# Patient Record
Sex: FEMALE
[De-identification: names, ages, dates, MRNs, and addresses within clinical notes are randomized per-mention and may not be internally consistent; named-entity substitution may affect disease eponyms.]

---

## 2018-09-23 ENCOUNTER — HOSPITAL ENCOUNTER (EMERGENCY)
Dept: HOSPITAL 92 - ERS | Age: 63
Discharge: HOME | End: 2018-09-23
Payer: SELF-PAY

## 2018-09-23 DIAGNOSIS — I50.9: ICD-10-CM

## 2018-09-23 DIAGNOSIS — Z79.84: ICD-10-CM

## 2018-09-23 DIAGNOSIS — I11.0: ICD-10-CM

## 2018-09-23 DIAGNOSIS — E11.9: ICD-10-CM

## 2018-09-23 DIAGNOSIS — R51: Primary | ICD-10-CM

## 2018-09-23 DIAGNOSIS — Z79.899: ICD-10-CM

## 2018-09-23 PROCEDURE — 96366 THER/PROPH/DIAG IV INF ADDON: CPT

## 2018-09-23 PROCEDURE — 70450 CT HEAD/BRAIN W/O DYE: CPT

## 2018-09-23 PROCEDURE — 96365 THER/PROPH/DIAG IV INF INIT: CPT

## 2018-09-23 PROCEDURE — 96375 TX/PRO/DX INJ NEW DRUG ADDON: CPT

## 2018-09-23 NOTE — CT
CT OF THE BRAIN WITHOUT CONTRAST:

 

COMPARISON: 

1/28/17.

 

HISTORY:  

Headache.

 

TECHNIQUE: 

Multiple contiguous axial images were obtained in a CT of the brain without contrast.

 

FINDINGS: 

The brain is normal in morphology and attenuation without focal lesions or confluent areas of infarct
ion.  There is no evidence of hydrocephalus, intracranial hemorrhage, or extraaxial fluid collection.
 

 

The calvarium and overlying soft tissues are unremarkable.  The visualized paranasal sinuses and mast
oid air cells are well aerated.

 

IMPRESSION: 

No evidence of acute intracranial abnormality.

 

POS: [facility]

## 2020-10-12 ENCOUNTER — HOSPITAL ENCOUNTER (INPATIENT)
Dept: HOSPITAL 92 - ERS | Age: 65
LOS: 36 days | Discharge: HOSPICE | DRG: 4 | End: 2020-11-17
Attending: HOSPITALIST | Admitting: STUDENT IN AN ORGANIZED HEALTH CARE EDUCATION/TRAINING PROGRAM
Payer: MEDICARE

## 2020-10-12 VITALS — BODY MASS INDEX: 31.7 KG/M2

## 2020-10-12 DIAGNOSIS — Z79.890: ICD-10-CM

## 2020-10-12 DIAGNOSIS — E11.22: ICD-10-CM

## 2020-10-12 DIAGNOSIS — G92: ICD-10-CM

## 2020-10-12 DIAGNOSIS — Z79.899: ICD-10-CM

## 2020-10-12 DIAGNOSIS — Z79.01: ICD-10-CM

## 2020-10-12 DIAGNOSIS — E83.51: ICD-10-CM

## 2020-10-12 DIAGNOSIS — H40.9: ICD-10-CM

## 2020-10-12 DIAGNOSIS — G72.81: ICD-10-CM

## 2020-10-12 DIAGNOSIS — U07.1: ICD-10-CM

## 2020-10-12 DIAGNOSIS — Z98.41: ICD-10-CM

## 2020-10-12 DIAGNOSIS — A41.89: Primary | ICD-10-CM

## 2020-10-12 DIAGNOSIS — N18.30: ICD-10-CM

## 2020-10-12 DIAGNOSIS — E11.65: ICD-10-CM

## 2020-10-12 DIAGNOSIS — I50.43: ICD-10-CM

## 2020-10-12 DIAGNOSIS — I35.1: ICD-10-CM

## 2020-10-12 DIAGNOSIS — R00.1: ICD-10-CM

## 2020-10-12 DIAGNOSIS — D69.6: ICD-10-CM

## 2020-10-12 DIAGNOSIS — E66.9: ICD-10-CM

## 2020-10-12 DIAGNOSIS — D63.1: ICD-10-CM

## 2020-10-12 DIAGNOSIS — J12.89: ICD-10-CM

## 2020-10-12 DIAGNOSIS — Z99.11: ICD-10-CM

## 2020-10-12 DIAGNOSIS — R13.10: ICD-10-CM

## 2020-10-12 DIAGNOSIS — Z79.4: ICD-10-CM

## 2020-10-12 DIAGNOSIS — E03.9: ICD-10-CM

## 2020-10-12 DIAGNOSIS — I13.0: ICD-10-CM

## 2020-10-12 DIAGNOSIS — N17.0: ICD-10-CM

## 2020-10-12 DIAGNOSIS — J96.01: ICD-10-CM

## 2020-10-12 DIAGNOSIS — E87.2: ICD-10-CM
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
## 2020-10-12 DIAGNOSIS — Z90.49: ICD-10-CM

## 2020-10-12 DIAGNOSIS — Z98.51: ICD-10-CM

## 2020-10-12 DIAGNOSIS — B96.1: ICD-10-CM

## 2020-10-12 DIAGNOSIS — G89.29: ICD-10-CM

## 2020-10-12 DIAGNOSIS — E87.5: ICD-10-CM

## 2020-10-12 DIAGNOSIS — Z66: ICD-10-CM

## 2020-10-12 DIAGNOSIS — E87.6: ICD-10-CM

## 2020-10-12 DIAGNOSIS — Z78.1: ICD-10-CM

## 2020-10-12 DIAGNOSIS — Z51.5: ICD-10-CM

## 2020-10-12 DIAGNOSIS — Z98.42: ICD-10-CM

## 2020-10-12 DIAGNOSIS — T79.7XXA: ICD-10-CM

## 2020-10-12 DIAGNOSIS — J93.83: ICD-10-CM

## 2020-10-12 DIAGNOSIS — N39.0: ICD-10-CM

## 2020-10-12 DIAGNOSIS — E46: ICD-10-CM

## 2020-10-12 DIAGNOSIS — E78.5: ICD-10-CM

## 2020-10-12 DIAGNOSIS — T38.0X5A: ICD-10-CM

## 2020-10-12 DIAGNOSIS — M54.9: ICD-10-CM

## 2020-10-12 LAB
ALBUMIN SERPL BCG-MCNC: 2.9 G/DL (ref 3.4–4.8)
ALP SERPL-CCNC: 65 U/L (ref 40–110)
ALT SERPL W P-5'-P-CCNC: 15 U/L (ref 8–55)
ANION GAP SERPL CALC-SCNC: 15 MMOL/L (ref 10–20)
APTT PPP: 41.9 SEC (ref 22.9–36.1)
AST SERPL-CCNC: 26 U/L (ref 5–34)
BASOPHILS # BLD AUTO: 0 THOU/UL (ref 0–0.2)
BASOPHILS NFR BLD AUTO: 0.2 % (ref 0–1)
BILIRUB SERPL-MCNC: 0.4 MG/DL (ref 0.2–1.2)
BUN SERPL-MCNC: 34 MG/DL (ref 9.8–20.1)
CALCIUM SERPL-MCNC: 7.5 MG/DL (ref 7.8–10.44)
CHLORIDE SERPL-SCNC: 105 MMOL/L (ref 98–107)
CK SERPL-CCNC: 148 U/L (ref 29–168)
CO2 SERPL-SCNC: 16 MMOL/L (ref 23–31)
CREAT CL PREDICTED SERPL C-G-VRATE: 0 ML/MIN (ref 70–130)
EOSINOPHIL # BLD AUTO: 0 THOU/UL (ref 0–0.7)
EOSINOPHIL NFR BLD AUTO: 0.3 % (ref 0–10)
GLOBULIN SER CALC-MCNC: 3.7 G/DL (ref 2.4–3.5)
GLUCOSE SERPL-MCNC: 185 MG/DL (ref 80–115)
HGB BLD-MCNC: 10.3 G/DL (ref 12–16)
INR PPP: 1.1
LIPASE SERPL-CCNC: 24 U/L (ref 8–78)
LYMPHOCYTES # BLD: 1.1 THOU/UL (ref 1.2–3.4)
LYMPHOCYTES NFR BLD AUTO: 17.7 % (ref 21–51)
MCH RBC QN AUTO: 30.6 PG (ref 27–31)
MCV RBC AUTO: 87.9 FL (ref 78–98)
MONOCYTES # BLD AUTO: 0.5 THOU/UL (ref 0.11–0.59)
MONOCYTES NFR BLD AUTO: 7.4 % (ref 0–10)
NEUTROPHILS # BLD AUTO: 4.6 THOU/UL (ref 1.4–6.5)
NEUTROPHILS NFR BLD AUTO: 74.4 % (ref 42–75)
PLATELET # BLD AUTO: 160 THOU/UL (ref 130–400)
POTASSIUM SERPL-SCNC: 4 MMOL/L (ref 3.5–5.1)
PROTHROMBIN TIME: 14 SEC (ref 12–14.7)
RBC # BLD AUTO: 3.38 MILL/UL (ref 4.2–5.4)
SODIUM SERPL-SCNC: 132 MMOL/L (ref 136–145)
WBC # BLD AUTO: 6.2 THOU/UL (ref 4.8–10.8)

## 2020-10-12 PROCEDURE — 70450 CT HEAD/BRAIN W/O DYE: CPT

## 2020-10-12 PROCEDURE — 86900 BLOOD TYPING SEROLOGIC ABO: CPT

## 2020-10-12 PROCEDURE — C1751 CATH, INF, PER/CENT/MIDLINE: HCPCS

## 2020-10-12 PROCEDURE — 87340 HEPATITIS B SURFACE AG IA: CPT

## 2020-10-12 PROCEDURE — 86901 BLOOD TYPING SEROLOGIC RH(D): CPT

## 2020-10-12 PROCEDURE — P9047 ALBUMIN (HUMAN), 25%, 50ML: HCPCS

## 2020-10-12 PROCEDURE — 87635 SARS-COV-2 COVID-19 AMP PRB: CPT

## 2020-10-12 PROCEDURE — 83690 ASSAY OF LIPASE: CPT

## 2020-10-12 PROCEDURE — 87040 BLOOD CULTURE FOR BACTERIA: CPT

## 2020-10-12 PROCEDURE — 85007 BL SMEAR W/DIFF WBC COUNT: CPT

## 2020-10-12 PROCEDURE — 86850 RBC ANTIBODY SCREEN: CPT

## 2020-10-12 PROCEDURE — 85610 PROTHROMBIN TIME: CPT

## 2020-10-12 PROCEDURE — 95819 EEG AWAKE AND ASLEEP: CPT

## 2020-10-12 PROCEDURE — 86706 HEP B SURFACE ANTIBODY: CPT

## 2020-10-12 PROCEDURE — P9017 PLASMA 1 DONOR FRZ W/IN 8 HR: HCPCS

## 2020-10-12 PROCEDURE — 93306 TTE W/DOPPLER COMPLETE: CPT

## 2020-10-12 PROCEDURE — 96365 THER/PROPH/DIAG IV INF INIT: CPT

## 2020-10-12 PROCEDURE — 94660 CPAP INITIATION&MGMT: CPT

## 2020-10-12 PROCEDURE — 70490 CT SOFT TISSUE NECK W/O DYE: CPT

## 2020-10-12 PROCEDURE — 36416 COLLJ CAPILLARY BLOOD SPEC: CPT

## 2020-10-12 PROCEDURE — 84484 ASSAY OF TROPONIN QUANT: CPT

## 2020-10-12 PROCEDURE — 87186 SC STD MICRODIL/AGAR DIL: CPT

## 2020-10-12 PROCEDURE — 87324 CLOSTRIDIUM AG IA: CPT

## 2020-10-12 PROCEDURE — 71045 X-RAY EXAM CHEST 1 VIEW: CPT

## 2020-10-12 PROCEDURE — 80202 ASSAY OF VANCOMYCIN: CPT

## 2020-10-12 PROCEDURE — 82550 ASSAY OF CK (CPK): CPT

## 2020-10-12 PROCEDURE — 87449 NOS EACH ORGANISM AG IA: CPT

## 2020-10-12 PROCEDURE — 85027 COMPLETE CBC AUTOMATED: CPT

## 2020-10-12 PROCEDURE — 83036 HEMOGLOBIN GLYCOSYLATED A1C: CPT

## 2020-10-12 PROCEDURE — 87077 CULTURE AEROBIC IDENTIFY: CPT

## 2020-10-12 PROCEDURE — 83880 ASSAY OF NATRIURETIC PEPTIDE: CPT

## 2020-10-12 PROCEDURE — 94002 VENT MGMT INPAT INIT DAY: CPT

## 2020-10-12 PROCEDURE — 93005 ELECTROCARDIOGRAM TRACING: CPT

## 2020-10-12 PROCEDURE — S0020 INJECTION, BUPIVICAINE HYDRO: HCPCS

## 2020-10-12 PROCEDURE — 80048 BASIC METABOLIC PNL TOTAL CA: CPT

## 2020-10-12 PROCEDURE — C1752 CATH,HEMODIALYSIS,SHORT-TERM: HCPCS

## 2020-10-12 PROCEDURE — U0003 INFECTIOUS AGENT DETECTION BY NUCLEIC ACID (DNA OR RNA); SEVERE ACUTE RESPIRATORY SYNDROME CORONAVIRUS 2 (SARS-COV-2) (CORONAVIRUS DISEASE [COVID-19]), AMPLIFIED PROBE TECHNIQUE, MAKING USE OF HIGH THROUGHPUT TECHNOLOGIES AS DESCRIBED BY CMS-2020-01-R: HCPCS

## 2020-10-12 PROCEDURE — 95712 VEEG 2-12 HR INTMT MNTR: CPT

## 2020-10-12 PROCEDURE — 96375 TX/PRO/DX INJ NEW DRUG ADDON: CPT

## 2020-10-12 PROCEDURE — 36415 COLL VENOUS BLD VENIPUNCTURE: CPT

## 2020-10-12 PROCEDURE — 81001 URINALYSIS AUTO W/SCOPE: CPT

## 2020-10-12 PROCEDURE — 95957 EEG DIGITAL ANALYSIS: CPT

## 2020-10-12 PROCEDURE — 85379 FIBRIN DEGRADATION QUANT: CPT

## 2020-10-12 PROCEDURE — 82728 ASSAY OF FERRITIN: CPT

## 2020-10-12 PROCEDURE — 94003 VENT MGMT INPAT SUBQ DAY: CPT

## 2020-10-12 PROCEDURE — 82805 BLOOD GASES W/O2 SATURATION: CPT

## 2020-10-12 PROCEDURE — 85025 COMPLETE CBC W/AUTO DIFF WBC: CPT

## 2020-10-12 PROCEDURE — 87086 URINE CULTURE/COLONY COUNT: CPT

## 2020-10-12 PROCEDURE — P9016 RBC LEUKOCYTES REDUCED: HCPCS

## 2020-10-12 PROCEDURE — 90935 HEMODIALYSIS ONE EVALUATION: CPT

## 2020-10-12 PROCEDURE — 36430 TRANSFUSION BLD/BLD COMPNT: CPT

## 2020-10-12 PROCEDURE — C9113 INJ PANTOPRAZOLE SODIUM, VIA: HCPCS

## 2020-10-12 PROCEDURE — 80053 COMPREHEN METABOLIC PANEL: CPT

## 2020-10-12 PROCEDURE — 85730 THROMBOPLASTIN TIME PARTIAL: CPT

## 2020-10-12 PROCEDURE — 71250 CT THORAX DX C-: CPT

## 2020-10-12 PROCEDURE — 86140 C-REACTIVE PROTEIN: CPT

## 2020-10-12 PROCEDURE — G0257 UNSCHED DIALYSIS ESRD PT HOS: HCPCS

## 2020-10-12 RX ADMIN — INSULIN LISPRO PRN UNIT: 100 INJECTION, SOLUTION INTRAVENOUS; SUBCUTANEOUS at 21:55

## 2020-10-12 RX ADMIN — INSULIN LISPRO PRN UNIT: 100 INJECTION, SOLUTION INTRAVENOUS; SUBCUTANEOUS at 18:35

## 2020-10-12 NOTE — CON
DATE OF CONSULTATION:  10/12/2020



HISTORY OF PRESENT ILLNESS:  A 64-year-old, who has been admitted a few times to

this hospital for a cardiac related issues in the past including a diastolic CHF,

type 2 diabetes, hypertension and lives in [place] and now has acquired COVID

infection, the whole family has been infected by the virus.  It started with her

daughter and so she is having dyspnea and has been sick for about a week and she is

little tachypneic.  No headaches.  No sore throat, odynophagia, or dysphagia.  No

vomiting.  No bleeding.  No chest pain.  No abdominal pain.  Had some diarrhea

yesterday, but not today.  She is having dysuria and she apparently has a history of

frequent UTIs in the past. 



PAST MEDICAL HISTORY:  Hypertension, systolic CHF, and type 2 diabetes.



PAST SURGICAL HISTORY:  Cholecystectomy.



SOCIAL HISTORY:  Retired, used to work at [employer].  Never smoker.  Does not

drink alcoholic beverages.  No drug use. 



FAMILY HISTORY:  COVID infection throughout the family starting with daughter.



PHYSICAL EXAMINATION:

VITAL SIGNS:  /65, pulse 97, respirations 22, temperature 100, and O2

saturation 88. 

SKIN:  Normal.  No lymphadenopathy.  Peripheral IV access.  She is voiding

spontaneously. 

HEENT:  Ocular movements conjugate.  Oral cavity normal. 

LUNGS:  With a few scattered inspiratory crackles.  No wheezing. 

HEART:  S1 and S2.  Regular rate.  No S3, S4, or murmurs. 

ABDOMEN:  Soft, not distended or tender.  No ascites.  No bladder distention. 

EXTREMITIES:  Moves extremities equally.  No edema.  Cognitive function appears to

be intact. 



LABORATORY DATA:  White cell count 6.2, hemoglobin 10.3, platelets 160, and 74%

neutrophils.  D-dimer 0.84.  Creatinine 2.16, her baseline is 1.86 at beginning of

last year.  Liver profile normal.  CRP 17.24.  Albumin 2.9.  The chest x-ray with

basilar confluent infiltrates, more prominent on the right side. 



ASSESSMENT AND PLAN:  Type 2 diabetes, hypertension, systolic congestive heart

failure, recurrent urinary tract infections, and moderate-to-severe COVID pneumonia.

 She is not eligible for remdesivir because of renal function and we will give her a

convalescent plasma.  Continue Decadron and enoxaparin.  Stop azithromycin.  Monitor

inflammatory markers. 







Job ID:  [id number]

## 2020-10-12 NOTE — PDOC.HHP
Hospitalist HPI





- History of Present Illness


Dyspnea


History of Present Illness: 





This is a 64-year-old female patient with a history of CHF, diabetes mellitus, 

hypertension, chronic back pain and hypothyroidism who tested positive for covid

2 days ago now presenting with worsening shortness of breath.


Patient notes that the test was done when they were not checked.


At about 3 AM this morning today feeling short of breath which became 

progressiveleading her to come to the ED for further evaluation.


At presentation her vitals were /65, pulse 97, respiration 22 temperature 

100 oxygen 88 on 2 L oxygen.


Labs showed sodium of 132, bicarb 16 creatinine 2.16 baseline of 1.86 on 

3/29/2019.  And BUN of 34.  PTT was elevated at 41.9, she had anemia of 10.3.  

Chest x-ray shows bilateral infiltrate.


She was started on dexamethasone 10 mg IV push and azithromycin.


Hospitalist team was consulted for admission.





Hospitalist ROS





- Review of Systems


Constitutional: denies: fever, chills, sweats


Respiratory: reports: cough, shortness of breath, SOB with excertion.  denies: 

dry, hemoptysis


Cardiovascular: reports: chest pain.  denies: palpitations, orthopnea


Gastrointestinal: denies: nausea, vomiting, abdominal pain


Genitourinary: denies: dysuria, frequency, incontinence


Neurological: denies: weakness, numbness, incoordination





- Medication


Medications: 


Medications


Amlodipine 10 mg daily


Dorzolamide/timolol 1 drop each eye twice daily


Lasix 40 mg daily


Glipizide 5 mg every morning


Levothyroxine 25 mcg daily


Lisinopril/HCTZ 20/12.5 mg twice daily


Lovastatin 20 mg daily


Metoprolol tartrate 50 mg twice daily


Spironolactone 25 mg daily


Cefdinir 300 mg twice daily


Vitamin D3 thousand units daily





Allergies: No known drug allergies





Hospitalist History





- Past Medical History


Cardiac: reports: HTN


Endocrine: reports: Diabetes





- Past Surgical History


Past Surgical History: reports: Cholecystectomy





- Family History


Other Family History: 





None of significance





- Social History


Smoking Status: Never smoker


Alcohol: reports: None





- Exam


General Appearance: awake alert


Heart: RRR, no murmur, no gallops, normal peripheral pulses


Respiratory - other findings: Bilateral coarse breath sounds.  4 L nasal cannula


Gastrointestinal: soft, non-tender, non-distended, normal bowel sounds


Extremities: no cyanosis, no edema


Neurological: cranial nerve grossly intact


Psychiatric: A&O x 3





Hospitalist Results





- Labs


Result Diagrams: 


                                 10/12/20 11:04





                                 10/12/20 11:04


Lab results: 


                                        











WBC  6.2 thou/uL (4.8-10.8)   10/12/20  11:04    


 


Hgb  10.3 g/dL (12.0-16.0)  L  10/12/20  11:04    


 


Hct  29.7 % (36.0-47.0)  L  10/12/20  11:04    


 


MCV  87.9 fL (78.0-98.0)   10/12/20  11:04    


 


Plt Count  160 thou/uL (130-400)   10/12/20  11:04    


 


Neutrophils %  74.4 % (42.0-75.0)   10/12/20  11:04    


 


Sodium  132 mmol/L (136-145)  L  10/12/20  11:04    


 


Potassium  4.0 mmol/L (3.5-5.1)   10/12/20  11:04    


 


Chloride  105 mmol/L ()   10/12/20  11:04    


 


Carbon Dioxide  16 mmol/L (23-31)  L  10/12/20  11:04    


 


BUN  34 mg/dL (9.8-20.1)  H  10/12/20  11:04    


 


Creatinine  2.16 mg/dL (0.6-1.1)  H  10/12/20  11:04    


 


Glucose  185 mg/dL ()  H  10/12/20  11:04    


 


Calcium  7.5 mg/dL (7.8-10.44)  L  10/12/20  11:04    


 


Total Bilirubin  0.4 mg/dL (0.2-1.2)   10/12/20  11:04    


 


AST  26 U/L (5-34)   10/12/20  11:04    


 


ALT  15 U/L (8-55)   10/12/20  11:04    


 


Alkaline Phosphatase  65 U/L ()   10/12/20  11:04    


 


Creatine Kinase  148 U/L ()   10/12/20  11:04    


 


Troponin I  Less than  0.010 ng/mL (< 0.028)   10/12/20  11:04    


 


B-Natriuretic Peptide  77.2 pg/mL (0-100)   10/12/20  11:04    


 


Serum Total Protein  6.6 g/dL (6.0-8.3)   10/12/20  11:04    


 


Albumin  2.9 g/dL (3.4-4.8)  L  10/12/20  11:04    


 


Lipase  24 U/L (8-78)   10/12/20  11:04    














Hospitalist H&P A/P





- Plan


Plan: 





This is a 64-year-old female patient with a history of diabetes mellitus, 

hypertension diagnosed with COVID 3 days ago is being admitted on account of 

worsening shortness of breath and cough with pneumonia.





Covid pneumonia


Admit to COVID floor


Start Remdesevir, steroids


Check d-dimer, CRP and ferritin


Begin anticoagulation as indicated


ID consult.  





Acute hypoxic respiratory failure


Secondary to COVID


PRN oxygen


Pulmonology consult if respiratory distress worsens.





Diabetes mellitus


Start standard correctional dose insulin


Hold home medications.





Hypertension


Blood pressure elevated


Start home medications were verified.


PRN metoprolol


Monitor





VT prophylaxisSCDs for now

## 2020-10-13 LAB
ANION GAP SERPL CALC-SCNC: 15 MMOL/L (ref 10–20)
BASOPHILS # BLD AUTO: 0 THOU/UL (ref 0–0.2)
BASOPHILS NFR BLD AUTO: 0.2 % (ref 0–1)
BUN SERPL-MCNC: 45 MG/DL (ref 9.8–20.1)
CALCIUM SERPL-MCNC: 8.1 MG/DL (ref 7.8–10.44)
CHLORIDE SERPL-SCNC: 103 MMOL/L (ref 98–107)
CO2 SERPL-SCNC: 18 MMOL/L (ref 23–31)
CREAT CL PREDICTED SERPL C-G-VRATE: 30 ML/MIN (ref 70–130)
EOSINOPHIL # BLD AUTO: 0 THOU/UL (ref 0–0.7)
EOSINOPHIL NFR BLD AUTO: 0.1 % (ref 0–10)
GLUCOSE SERPL-MCNC: 350 MG/DL (ref 80–115)
HGB BLD-MCNC: 11.1 G/DL (ref 12–16)
LYMPHOCYTES # BLD: 0.8 THOU/UL (ref 1.2–3.4)
LYMPHOCYTES NFR BLD AUTO: 11 % (ref 21–51)
MCH RBC QN AUTO: 30.8 PG (ref 27–31)
MCV RBC AUTO: 90.2 FL (ref 78–98)
MONOCYTES # BLD AUTO: 0.1 THOU/UL (ref 0.11–0.59)
MONOCYTES NFR BLD AUTO: 1.8 % (ref 0–10)
NEUTROPHILS # BLD AUTO: 6.2 THOU/UL (ref 1.4–6.5)
NEUTROPHILS NFR BLD AUTO: 86.9 % (ref 42–75)
PLATELET # BLD AUTO: 177 THOU/UL (ref 130–400)
POTASSIUM SERPL-SCNC: 4.9 MMOL/L (ref 3.5–5.1)
RBC # BLD AUTO: 3.6 MILL/UL (ref 4.2–5.4)
SARS-COV-2 RNA RESP QL NAA+PROBE: DETECTED
SODIUM SERPL-SCNC: 131 MMOL/L (ref 136–145)
WBC # BLD AUTO: 7.1 THOU/UL (ref 4.8–10.8)

## 2020-10-13 PROCEDURE — XW13325 TRANSFUSION OF CONVALESCENT PLASMA (NONAUTOLOGOUS) INTO PERIPHERAL VEIN, PERCUTANEOUS APPROACH, NEW TECHNOLOGY GROUP 5: ICD-10-PCS | Performed by: INTERNAL MEDICINE

## 2020-10-13 RX ADMIN — POLYVINYL ALCOHOL, POVIDONE SCH EACH: 14; 6 SOLUTION/ DROPS OPHTHALMIC at 21:38

## 2020-10-13 RX ADMIN — DORZOLAMIDE HYDROCHLORIDE AND TIMOLOL MALEATE SCH EACH: 22.3; 6.8 SOLUTION/ DROPS OPHTHALMIC at 21:33

## 2020-10-13 RX ADMIN — BRIMONIDINE TARTRATE SCH DROP: 2 SOLUTION OPHTHALMIC at 09:58

## 2020-10-13 RX ADMIN — INSULIN GLARGINE SCH MLS: 100 INJECTION, SOLUTION SUBCUTANEOUS at 10:03

## 2020-10-13 RX ADMIN — BRIMONIDINE TARTRATE SCH DROP: 2 SOLUTION OPHTHALMIC at 21:32

## 2020-10-13 RX ADMIN — Medication SCH UNITS: at 07:53

## 2020-10-13 RX ADMIN — DORZOLAMIDE HYDROCHLORIDE AND TIMOLOL MALEATE SCH EACH: 22.3; 6.8 SOLUTION/ DROPS OPHTHALMIC at 09:57

## 2020-10-13 RX ADMIN — Medication SCH: at 13:54

## 2020-10-13 RX ADMIN — INSULIN LISPRO PRN UNIT: 100 INJECTION, SOLUTION INTRAVENOUS; SUBCUTANEOUS at 11:31

## 2020-10-13 RX ADMIN — POLYETHYLENE GLYCOL 400 AND PROPYLENE GLYCOL SCH EACH: 4; 3 SOLUTION/ DROPS OPHTHALMIC at 09:57

## 2020-10-13 RX ADMIN — INSULIN GLARGINE SCH MLS: 100 INJECTION, SOLUTION SUBCUTANEOUS at 21:37

## 2020-10-13 RX ADMIN — Medication SCH ML: at 21:40

## 2020-10-13 RX ADMIN — INSULIN LISPRO PRN UNIT: 100 INJECTION, SOLUTION INTRAVENOUS; SUBCUTANEOUS at 05:49

## 2020-10-13 RX ADMIN — INSULIN LISPRO PRN UNIT: 100 INJECTION, SOLUTION INTRAVENOUS; SUBCUTANEOUS at 18:35

## 2020-10-13 RX ADMIN — DORZOLAMIDE HYDROCHLORIDE AND TIMOLOL MALEATE SCH EACH: 22.3; 6.8 SOLUTION/ DROPS OPHTHALMIC at 16:12

## 2020-10-13 RX ADMIN — POLYVINYL ALCOHOL, POVIDONE SCH: 14; 6 SOLUTION/ DROPS OPHTHALMIC at 13:54

## 2020-10-13 RX ADMIN — POLYETHYLENE GLYCOL 400 AND PROPYLENE GLYCOL SCH EACH: 4; 3 SOLUTION/ DROPS OPHTHALMIC at 21:42

## 2020-10-13 NOTE — RAD
XR Chest 1 View Portable



HISTORY: Chest pain



COMPARISON: 1/28/2017



FINDINGS: The heart size normal. The aorta is tortuous. The lungs are expanded with mild patchy infil
trate in the right lower lung. No pneumothoraces or large effusions are seen. There are

degenerative changes in the spine.



IMPRESSION: Probable right sided pneumonia.



Reported By: Ruiz Stern 

Electronically Signed:  10/12/2020 11:28 AM

## 2020-10-14 LAB
ANALYZER IN CARDIO: (no result)
BACTERIA UR QL AUTO: (no result) HPF
BASE EXCESS STD BLDA CALC-SCNC: -6.5 MEQ/L
CA-I BLDA-SCNC: 1.13 MMOL/L (ref 1.12–1.3)
HCO3 BLDA-SCNC: 17.2 MEQ/L (ref 22–28)
HCT VFR BLDA CALC: 34 % (ref 36–47)
HGB BLDA-MCNC: 11.4 G/DL (ref 12–16)
PCO2 BLDA: 28.8 MMHG (ref 35–45)
PH BLDA: 7.39 [PH] (ref 7.35–7.45)
PO2 BLDA: 45.9 MMHG (ref 80–?)
POTASSIUM BLD-SCNC: 4.36 MMOL/L (ref 3.7–5.3)
PROT UR STRIP.AUTO-MCNC: 200 MG/DL
RBC UR QL AUTO: (no result) HPF (ref 0–3)
SP GR UR STRIP: 1.01 (ref 1–1.04)
SPECIMEN DRAWN FROM PATIENT: (no result)
WBC UR QL AUTO: (no result) HPF (ref 0–3)

## 2020-10-14 RX ADMIN — DORZOLAMIDE HYDROCHLORIDE AND TIMOLOL MALEATE SCH EACH: 22.3; 6.8 SOLUTION/ DROPS OPHTHALMIC at 08:15

## 2020-10-14 RX ADMIN — AZITHROMYCIN SCH MLS: 500 INJECTION, POWDER, LYOPHILIZED, FOR SOLUTION INTRAVENOUS at 11:19

## 2020-10-14 RX ADMIN — POLYETHYLENE GLYCOL 400 AND PROPYLENE GLYCOL SCH EACH: 4; 3 SOLUTION/ DROPS OPHTHALMIC at 20:59

## 2020-10-14 RX ADMIN — INSULIN LISPRO PRN UNIT: 100 INJECTION, SOLUTION INTRAVENOUS; SUBCUTANEOUS at 21:58

## 2020-10-14 RX ADMIN — BRIMONIDINE TARTRATE SCH DROP: 2 SOLUTION OPHTHALMIC at 20:58

## 2020-10-14 RX ADMIN — INSULIN GLARGINE SCH: 100 INJECTION, SOLUTION SUBCUTANEOUS at 10:34

## 2020-10-14 RX ADMIN — INSULIN GLARGINE SCH MLS: 100 INJECTION, SOLUTION SUBCUTANEOUS at 20:14

## 2020-10-14 RX ADMIN — Medication SCH ML: at 08:16

## 2020-10-14 RX ADMIN — DORZOLAMIDE HYDROCHLORIDE AND TIMOLOL MALEATE SCH EACH: 22.3; 6.8 SOLUTION/ DROPS OPHTHALMIC at 20:59

## 2020-10-14 RX ADMIN — Medication SCH ML: at 20:13

## 2020-10-14 RX ADMIN — BRIMONIDINE TARTRATE SCH DROP: 2 SOLUTION OPHTHALMIC at 08:15

## 2020-10-14 RX ADMIN — Medication SCH UNITS: at 08:14

## 2020-10-14 RX ADMIN — POLYETHYLENE GLYCOL 400 AND PROPYLENE GLYCOL SCH EACH: 4; 3 SOLUTION/ DROPS OPHTHALMIC at 08:16

## 2020-10-14 RX ADMIN — DORZOLAMIDE HYDROCHLORIDE AND TIMOLOL MALEATE SCH EACH: 22.3; 6.8 SOLUTION/ DROPS OPHTHALMIC at 18:08

## 2020-10-14 NOTE — RAD
Portable chest:

10/14/2020



COMPARISON: 10/12/2020



HISTORY: Shortness of breath



FINDINGS: There is new interstitial and alveolar opacity in the left perihilar region and left lung b
ase. There is worsening interstitial opacity with groundglass disease and airspace disease within

the right upper lobe, the right perihilar region, and in the right lung base. No pneumothorax is evid
ent. No large volume pleural effusion.



IMPRESSION: Worsening diffuse interstitial and alveolar opacity. Findings may signify pulmonary edema
 and/or multi lobar infectious pneumonitis.



Reported By: Capo Almonte 

Electronically Signed:  10/14/2020 7:33 AM

## 2020-10-15 LAB
ANION GAP SERPL CALC-SCNC: 14 MMOL/L (ref 10–20)
BASOPHILS # BLD AUTO: 0 THOU/UL (ref 0–0.2)
BASOPHILS NFR BLD AUTO: 0 % (ref 0–1)
BUN SERPL-MCNC: 69 MG/DL (ref 9.8–20.1)
CALCIUM SERPL-MCNC: 8.1 MG/DL (ref 7.8–10.44)
CHLORIDE SERPL-SCNC: 106 MMOL/L (ref 98–107)
CO2 SERPL-SCNC: 16 MMOL/L (ref 23–31)
CREAT CL PREDICTED SERPL C-G-VRATE: 33 ML/MIN (ref 70–130)
CRP SERPL-MCNC: 14.32 MG/DL
EOSINOPHIL # BLD AUTO: 0 THOU/UL (ref 0–0.7)
EOSINOPHIL NFR BLD AUTO: 0 % (ref 0–10)
GLUCOSE SERPL-MCNC: 181 MG/DL (ref 80–115)
HGB BLD-MCNC: 10.7 G/DL (ref 12–16)
LYMPHOCYTES # BLD: 1.1 THOU/UL (ref 1.2–3.4)
LYMPHOCYTES NFR BLD AUTO: 6 % (ref 21–51)
MCH RBC QN AUTO: 31.5 PG (ref 27–31)
MCV RBC AUTO: 88.9 FL (ref 78–98)
MONOCYTES # BLD AUTO: 0.6 THOU/UL (ref 0.11–0.59)
MONOCYTES NFR BLD AUTO: 3.2 % (ref 0–10)
NEUTROPHILS # BLD AUTO: 17.2 THOU/UL (ref 1.4–6.5)
NEUTROPHILS NFR BLD AUTO: 90.8 % (ref 42–75)
PLATELET # BLD AUTO: 234 THOU/UL (ref 130–400)
POTASSIUM SERPL-SCNC: 4.4 MMOL/L (ref 3.5–5.1)
RBC # BLD AUTO: 3.41 MILL/UL (ref 4.2–5.4)
SODIUM SERPL-SCNC: 132 MMOL/L (ref 136–145)
WBC # BLD AUTO: 18.9 THOU/UL (ref 4.8–10.8)

## 2020-10-15 RX ADMIN — Medication SCH ML: at 09:02

## 2020-10-15 RX ADMIN — BRIMONIDINE TARTRATE SCH DROP: 2 SOLUTION OPHTHALMIC at 19:59

## 2020-10-15 RX ADMIN — DORZOLAMIDE HYDROCHLORIDE AND TIMOLOL MALEATE SCH EACH: 22.3; 6.8 SOLUTION/ DROPS OPHTHALMIC at 09:01

## 2020-10-15 RX ADMIN — Medication SCH UNITS: at 09:00

## 2020-10-15 RX ADMIN — INSULIN LISPRO PRN UNIT: 100 INJECTION, SOLUTION INTRAVENOUS; SUBCUTANEOUS at 06:04

## 2020-10-15 RX ADMIN — BRIMONIDINE TARTRATE SCH DROP: 2 SOLUTION OPHTHALMIC at 09:01

## 2020-10-15 RX ADMIN — Medication SCH MG: at 19:58

## 2020-10-15 RX ADMIN — Medication SCH ML: at 20:00

## 2020-10-15 RX ADMIN — INSULIN LISPRO PRN UNIT: 100 INJECTION, SOLUTION INTRAVENOUS; SUBCUTANEOUS at 17:50

## 2020-10-15 RX ADMIN — AZITHROMYCIN SCH MLS: 500 INJECTION, POWDER, LYOPHILIZED, FOR SOLUTION INTRAVENOUS at 09:23

## 2020-10-15 RX ADMIN — INSULIN GLARGINE SCH MLS: 100 INJECTION, SOLUTION SUBCUTANEOUS at 20:00

## 2020-10-15 RX ADMIN — POLYETHYLENE GLYCOL 400 AND PROPYLENE GLYCOL SCH EACH: 4; 3 SOLUTION/ DROPS OPHTHALMIC at 09:01

## 2020-10-15 RX ADMIN — DORZOLAMIDE HYDROCHLORIDE AND TIMOLOL MALEATE SCH EACH: 22.3; 6.8 SOLUTION/ DROPS OPHTHALMIC at 19:59

## 2020-10-15 RX ADMIN — INSULIN GLARGINE SCH MLS: 100 INJECTION, SOLUTION SUBCUTANEOUS at 08:59

## 2020-10-15 RX ADMIN — POLYETHYLENE GLYCOL 400 AND PROPYLENE GLYCOL SCH EACH: 4; 3 SOLUTION/ DROPS OPHTHALMIC at 19:59

## 2020-10-15 RX ADMIN — POLYVINYL ALCOHOL, POVIDONE SCH EACH: 14; 6 SOLUTION/ DROPS OPHTHALMIC at 09:01

## 2020-10-15 RX ADMIN — DORZOLAMIDE HYDROCHLORIDE AND TIMOLOL MALEATE SCH: 22.3; 6.8 SOLUTION/ DROPS OPHTHALMIC at 14:14

## 2020-10-15 NOTE — PQF
CLINICAL DOCUMENTATION CLARIFICATION FORM:









                 Dear Dr. Sylvester                       Date 10/15/20

Please exercise your independent, professional judgment in responding to the 
clarification form. 

Clinical indicators are provided on the bottom of this form for your review.



Please check appropriate box(es):



[ x ] Sepsis due to: covid _________________________________________



[  ] Severe sepsis with associated acute organ dysfunction: 

   [  ] Acute Respiratory Failure   [  ] Acute Kidney injury w/o ATN     [  ] 
Acute Kidney Injury w ATN 



[  ] Localized infection without sepsis



[  ] SIRS due to non-infectious process (please specify etiology) 
____________________________________

   [  ] with organ dysfunction     [  ] without organ dysfunction



[  ] Other diagnosis ___________



[  ] Unable to determine



In addition, please specify:

Present on Admission (POA):  [ x ] Yes             [  ] No             [  ] 
Unable to determine



For continuity of documentation, please document condition throughout progress 
notes and discharge summary.  Thank You.



CLINICAL INDICATORS - SIGNS / SYMPTOMS / LABS   / RESULTS AND LOCATION IN MR



WBC 10/13: 7.1, NOW 18.9

CRP 14.32



RR 28



RISKS:

COVID 19

PNEUMONIA

URINE- GRAM NEGATIVE RODS



TREATMENT:

IV AZITHROMYCIN (ER)

IV CEFEPIME (10/14-PRESENT)

IV DECADRON (10/13-PRESENT)

[facility] MONITORING

BLOOD AND URINE CULTURES (10/12, 10/14)





CDS Signature:  Alycia Villatoro RN                                  Phone #: 
[phone]              Date: 10/15/20

[facility]

## 2020-10-15 NOTE — PQF
CLINICAL DOCUMENTATION CLARIFICATION FORM:









                   Dear Dr. Sylvester                           Date: 10/15/20

Please exercise your independent, professional judgment in responding to the 
clarification form. 

Clinical indicators are provided on the bottom of this form for your review.



Please check appropriate box(es):



[ X ] UTI  please specify if due to or related to (as applicable):            

              [  ] Indwelling catheter        [  ] Self-catheterization         
 

    [  ] Suprapubic catheter   [ X ] Unable to determine etiology               
   

 

       UTI Site:             

              [  ] Kidney       [  ] Ureter           [  ] Bladder        [  ] 
Urethra      [  X] Unable to determine

              Specify Organism (if known):__GNR_____________________    [  ] 
Unknown organism



[  ] Contaminated urine specimen without UTI



[  ] Other diagnosis ___________



[  ] Unable to determine



In addition, please specify:

Present on Admission (POA):  [ X ] Yes             [  ] No             [  ] 
Unable to determine



For continuity of documentation, please document condition throughout progress 
notes and discharge summary.  Thank You.



CLINICAL INDICATORS - SIGNS / SYMPTOMS / LABS    / RESULTS AND LOCATION IN MR



UA- 1+BLOOD, 4-6 WBC, 4+ BACTERIA



URINE CULTURE 10/14: GRAM NEGATIVE RODS



RISKS:

PUREWICK CATHETER



TREATMENT:

UA/C&S

IV MAXIPIME (10/14-PRESENT)











CDS Signature:  lAycia Villatoro RN                          Phone #:   
[phone]                                                      Date: 10/15/20





                 This is a permanent part of the Medical Record

[facility]

## 2020-10-15 NOTE — PQF
CLINICAL DOCUMENTATION CLARIFICATION FORM:







              Dear Dr. Sylvester                       Date: 10/15/20

Please exercise your independent, professional judgment in responding to the 
clarification form. 

Clinical indicators are provided on the bottom of this form for your review.



Please check appropriate box(es):



HEART FAILURE:

A.   ACUITY



[  ] Acute          [ X ] Acute on Chronic          [  ] Chronic



B.   TYPE:



      [  ] Systolic / HFrEF      [  ] Diastolic / HFpEF       [  ] Combined 
Systolic / Diastolic



      [ X ] Hypertensive Heart and Kidney disease



      [  ] Hypertensive Heart Disease



      [  ] Hypertensive Kidney Disease



      [  ] Other diagnosis ___________



      [  ] Unable to determine



In addition, please specify:

Present on Admission (POA):  [ X ] Yes             [  ] No             [  ] 
Unable to determine





For continuity of documentation, please document condition throughout progress 
notes and discharge summary.  Thank You.



CLINICAL INDICATORS - SIGNS / SYMPTOMS / LABS  / RESULTS AND LOCATION IN EMR



H/O CHRONIC SYSTOLIC HEART FAILURE (PROGRESS NOTE 10/15-LE)



RISKS:

EVIDENCE OF PULMONARY EDEMA ON CXR (PROGRESS NOTE 10/15-LE)

.5 (10/15)



TREATMENT:

IV LASIX (10/15)

CHEST XRAYS 10/12, 10/14

CARDIAC MONITORING







CDS Signature:   Alycia Villatoro RN                                       Phone #:
 [phone]                     Date: 10/15/20

[facility]

## 2020-10-16 LAB
ANALYZER IN CARDIO: (no result)
ANION GAP SERPL CALC-SCNC: 15 MMOL/L (ref 10–20)
BASE EXCESS STD BLDA CALC-SCNC: -7 MEQ/L
BASOPHILS # BLD AUTO: 0 THOU/UL (ref 0–0.2)
BASOPHILS NFR BLD AUTO: 0.1 % (ref 0–1)
BUN SERPL-MCNC: 74 MG/DL (ref 9.8–20.1)
CA-I BLDA-SCNC: 1.09 MMOL/L (ref 1.12–1.3)
CALCIUM SERPL-MCNC: 7.8 MG/DL (ref 7.8–10.44)
CHLORIDE SERPL-SCNC: 106 MMOL/L (ref 98–107)
CO2 SERPL-SCNC: 14 MMOL/L (ref 23–31)
CREAT CL PREDICTED SERPL C-G-VRATE: 36 ML/MIN (ref 70–130)
EOSINOPHIL # BLD AUTO: 0 THOU/UL (ref 0–0.7)
EOSINOPHIL NFR BLD AUTO: 0.1 % (ref 0–10)
GLUCOSE SERPL-MCNC: 94 MG/DL (ref 80–115)
HCO3 BLDA-SCNC: 15.7 MEQ/L (ref 22–28)
HCT VFR BLDA CALC: 33 % (ref 36–47)
HGB BLD-MCNC: 10.5 G/DL (ref 12–16)
HGB BLDA-MCNC: 11.1 G/DL (ref 12–16)
LYMPHOCYTES # BLD: 1.2 THOU/UL (ref 1.2–3.4)
LYMPHOCYTES NFR BLD AUTO: 7.3 % (ref 21–51)
MCH RBC QN AUTO: 29.9 PG (ref 27–31)
MCV RBC AUTO: 87.9 FL (ref 78–98)
MONOCYTES # BLD AUTO: 0.9 THOU/UL (ref 0.11–0.59)
MONOCYTES NFR BLD AUTO: 5.2 % (ref 0–10)
NEUTROPHILS # BLD AUTO: 14.6 THOU/UL (ref 1.4–6.5)
NEUTROPHILS NFR BLD AUTO: 87.3 % (ref 42–75)
PCO2 BLDA: 24.2 MMHG (ref 35–45)
PH BLDA: 7.43 [PH] (ref 7.35–7.45)
PLATELET # BLD AUTO: 253 THOU/UL (ref 130–400)
PO2 BLDA: 44 MMHG (ref 80–?)
POTASSIUM BLD-SCNC: 4.03 MMOL/L (ref 3.7–5.3)
POTASSIUM SERPL-SCNC: 4.2 MMOL/L (ref 3.5–5.1)
RBC # BLD AUTO: 3.53 MILL/UL (ref 4.2–5.4)
SODIUM SERPL-SCNC: 131 MMOL/L (ref 136–145)
SPECIMEN DRAWN FROM PATIENT: (no result)
WBC # BLD AUTO: 16.7 THOU/UL (ref 4.8–10.8)

## 2020-10-16 RX ADMIN — INSULIN GLARGINE SCH MLS: 100 INJECTION, SOLUTION SUBCUTANEOUS at 20:47

## 2020-10-16 RX ADMIN — POLYVINYL ALCOHOL, POVIDONE SCH EACH: 14; 6 SOLUTION/ DROPS OPHTHALMIC at 09:37

## 2020-10-16 RX ADMIN — INSULIN GLARGINE SCH: 100 INJECTION, SOLUTION SUBCUTANEOUS at 07:55

## 2020-10-16 RX ADMIN — Medication SCH ML: at 09:41

## 2020-10-16 RX ADMIN — AZITHROMYCIN SCH MLS: 500 INJECTION, POWDER, LYOPHILIZED, FOR SOLUTION INTRAVENOUS at 09:13

## 2020-10-16 RX ADMIN — BRIMONIDINE TARTRATE SCH DROP: 2 SOLUTION OPHTHALMIC at 09:40

## 2020-10-16 RX ADMIN — DEXTROSE MONOHYDRATE PRN GM: 25 INJECTION, SOLUTION INTRAVENOUS at 11:15

## 2020-10-16 RX ADMIN — Medication SCH ML: at 20:37

## 2020-10-16 RX ADMIN — POLYETHYLENE GLYCOL 400 AND PROPYLENE GLYCOL SCH EACH: 4; 3 SOLUTION/ DROPS OPHTHALMIC at 20:35

## 2020-10-16 RX ADMIN — DORZOLAMIDE HYDROCHLORIDE AND TIMOLOL MALEATE SCH EACH: 22.3; 6.8 SOLUTION/ DROPS OPHTHALMIC at 14:14

## 2020-10-16 RX ADMIN — Medication SCH MG: at 20:37

## 2020-10-16 RX ADMIN — SODIUM BICARBONATE SCH MG: 325 TABLET ORAL at 14:14

## 2020-10-16 RX ADMIN — SODIUM BICARBONATE SCH MG: 325 TABLET ORAL at 20:36

## 2020-10-16 RX ADMIN — DORZOLAMIDE HYDROCHLORIDE AND TIMOLOL MALEATE SCH EACH: 22.3; 6.8 SOLUTION/ DROPS OPHTHALMIC at 09:40

## 2020-10-16 RX ADMIN — POLYETHYLENE GLYCOL 400 AND PROPYLENE GLYCOL SCH EACH: 4; 3 SOLUTION/ DROPS OPHTHALMIC at 09:40

## 2020-10-16 RX ADMIN — Medication SCH: at 09:36

## 2020-10-16 RX ADMIN — BRIMONIDINE TARTRATE SCH DROP: 2 SOLUTION OPHTHALMIC at 20:36

## 2020-10-16 RX ADMIN — DORZOLAMIDE HYDROCHLORIDE AND TIMOLOL MALEATE SCH EACH: 22.3; 6.8 SOLUTION/ DROPS OPHTHALMIC at 20:36

## 2020-10-16 RX ADMIN — Medication SCH MG: at 09:37

## 2020-10-16 NOTE — RAD
CHEST 1 VIEW PORTABLE:

 

Date:  10/16/2020

 

HISTORY:  

Positive COVID, pulmonary edema, prior respiratory failure. 

 

FINDINGS:

Evidence for bilateral alveolar and ground-glass opacity changes throughout both lungs, slightly impr
bradford and overall less parenchymal density when compared to the prior study. Stable heart size. 

 

IMPRESSION: 

Bilateral mostly alveolar and ground-glass opacity changes throughout both lungs, slightly improved i
n overall opacity when compared to the prior study. 

 

 

POS: [facility]

## 2020-10-17 LAB
ANION GAP SERPL CALC-SCNC: 15 MMOL/L (ref 10–20)
BASOPHILS # BLD AUTO: 0 THOU/UL (ref 0–0.2)
BASOPHILS NFR BLD AUTO: 0.1 % (ref 0–1)
BUN SERPL-MCNC: 72 MG/DL (ref 9.8–20.1)
CALCIUM SERPL-MCNC: 7.5 MG/DL (ref 7.8–10.44)
CHLORIDE SERPL-SCNC: 104 MMOL/L (ref 98–107)
CO2 SERPL-SCNC: 17 MMOL/L (ref 23–31)
CREAT CL PREDICTED SERPL C-G-VRATE: 36 ML/MIN (ref 70–130)
EOSINOPHIL # BLD AUTO: 0 THOU/UL (ref 0–0.7)
EOSINOPHIL NFR BLD AUTO: 0.1 % (ref 0–10)
GLUCOSE SERPL-MCNC: 245 MG/DL (ref 80–115)
HGB BLD-MCNC: 10.1 G/DL (ref 12–16)
LYMPHOCYTES # BLD: 0.9 THOU/UL (ref 1.2–3.4)
LYMPHOCYTES NFR BLD AUTO: 6.6 % (ref 21–51)
MCH RBC QN AUTO: 30.3 PG (ref 27–31)
MCV RBC AUTO: 87 FL (ref 78–98)
MONOCYTES # BLD AUTO: 0.7 THOU/UL (ref 0.11–0.59)
MONOCYTES NFR BLD AUTO: 5.1 % (ref 0–10)
NEUTROPHILS # BLD AUTO: 12.3 THOU/UL (ref 1.4–6.5)
NEUTROPHILS NFR BLD AUTO: 88.1 % (ref 42–75)
PLATELET # BLD AUTO: 240 THOU/UL (ref 130–400)
POTASSIUM SERPL-SCNC: 4.6 MMOL/L (ref 3.5–5.1)
RBC # BLD AUTO: 3.34 MILL/UL (ref 4.2–5.4)
SODIUM SERPL-SCNC: 131 MMOL/L (ref 136–145)
WBC # BLD AUTO: 13.9 THOU/UL (ref 4.8–10.8)

## 2020-10-17 RX ADMIN — AZITHROMYCIN SCH MLS: 500 INJECTION, POWDER, LYOPHILIZED, FOR SOLUTION INTRAVENOUS at 10:31

## 2020-10-17 RX ADMIN — SODIUM BICARBONATE SCH MG: 325 TABLET ORAL at 08:31

## 2020-10-17 RX ADMIN — SODIUM BICARBONATE SCH MG: 325 TABLET ORAL at 15:10

## 2020-10-17 RX ADMIN — DORZOLAMIDE HYDROCHLORIDE AND TIMOLOL MALEATE SCH EACH: 22.3; 6.8 SOLUTION/ DROPS OPHTHALMIC at 20:50

## 2020-10-17 RX ADMIN — POLYETHYLENE GLYCOL 400 AND PROPYLENE GLYCOL SCH EACH: 4; 3 SOLUTION/ DROPS OPHTHALMIC at 20:49

## 2020-10-17 RX ADMIN — Medication SCH ML: at 08:31

## 2020-10-17 RX ADMIN — Medication SCH MG: at 08:27

## 2020-10-17 RX ADMIN — Medication SCH ML: at 20:51

## 2020-10-17 RX ADMIN — Medication SCH UNITS: at 08:29

## 2020-10-17 RX ADMIN — INSULIN GLARGINE SCH MLS: 100 INJECTION, SOLUTION SUBCUTANEOUS at 20:52

## 2020-10-17 RX ADMIN — INSULIN LISPRO PRN UNIT: 100 INJECTION, SOLUTION INTRAVENOUS; SUBCUTANEOUS at 05:08

## 2020-10-17 RX ADMIN — DORZOLAMIDE HYDROCHLORIDE AND TIMOLOL MALEATE SCH EACH: 22.3; 6.8 SOLUTION/ DROPS OPHTHALMIC at 08:28

## 2020-10-17 RX ADMIN — POLYVINYL ALCOHOL, POVIDONE SCH EACH: 14; 6 SOLUTION/ DROPS OPHTHALMIC at 08:31

## 2020-10-17 RX ADMIN — SODIUM BICARBONATE SCH MG: 325 TABLET ORAL at 20:49

## 2020-10-17 RX ADMIN — INSULIN GLARGINE SCH MLS: 100 INJECTION, SOLUTION SUBCUTANEOUS at 08:30

## 2020-10-17 RX ADMIN — DORZOLAMIDE HYDROCHLORIDE AND TIMOLOL MALEATE SCH EACH: 22.3; 6.8 SOLUTION/ DROPS OPHTHALMIC at 15:12

## 2020-10-17 RX ADMIN — BRIMONIDINE TARTRATE SCH DROP: 2 SOLUTION OPHTHALMIC at 20:50

## 2020-10-17 RX ADMIN — BRIMONIDINE TARTRATE SCH DROP: 2 SOLUTION OPHTHALMIC at 08:28

## 2020-10-17 RX ADMIN — INSULIN LISPRO PRN UNIT: 100 INJECTION, SOLUTION INTRAVENOUS; SUBCUTANEOUS at 22:19

## 2020-10-17 RX ADMIN — Medication SCH MG: at 20:49

## 2020-10-17 RX ADMIN — POLYETHYLENE GLYCOL 400 AND PROPYLENE GLYCOL SCH EACH: 4; 3 SOLUTION/ DROPS OPHTHALMIC at 08:32

## 2020-10-17 NOTE — EKG
Test Reason : 

Blood Pressure : ***/*** mmHG

Vent. Rate : 092 BPM     Atrial Rate : 092 BPM

   P-R Int : 150 ms          QRS Dur : 082 ms

    QT Int : 358 ms       P-R-T Axes : 006 004 073 degrees

   QTc Int : 442 ms

 

Normal sinus rhythm

Normal ECG

 

Confirmed by DEBORA POWERS DO ([id number]),  CELIA CROFT ([id number]) on 10/17/2020 1:25:48 PM

 

Referred By:             Confirmed By:DEBORA POWERS DO

## 2020-10-17 NOTE — CON
DATE OF CONSULTATION:  10/17/2020



HISTORY OF PRESENT ILLNESS:  Ms. Sanches is a 64-year-old female who says she was

sick for a week __________ she was COVID tested 2 days prior to admission.  She 
is

probably 2 weeks into this illness.  I was consulted for shortness of breath. 



Records have been reviewed.  Radiographs have been reviewed.  She has COVID

pneumonia in my opinion. 



She __________ rapidly improving. 



She has diabetes, hypertension, and congestive heart failure in the past. 



She has had a cholecystectomy. 



She is a nonsmoker and nondrinker.



FAMILY HISTORY:  Positive for no lung disease in early age, but multiple family

members with COVID. 



REVIEW OF SYSTEMS:  Otherwise negative.  She nods that she is a little tired.



PHYSICAL EXAMINATION:

VITAL SIGNS:  Blood pressure 142/57, heart rate is in the 60s, respiratory rates
in

the teens to low 20s. 

GENERAL:  She looks tired, but she does not have any muscle signs of fatigue. 

HEENT:  Pupils are equal.  Sclerae anicteric. 

NECK:  Without accessory muscle use, without lymphadenopathy. 

LUNGS:  Remarkable for coarse equal breath sounds. 

HEART:  Regular rhythm. 

ABDOMEN:  Soft. 

EXTREMITIES:  Without clubbing, cyanosis, or edema. 

NEUROLOGIC:  Nonfocal.



LABORATORY DATA:  White count 13.9, hemoglobin 10.1, and platelets 240.

Electrolytes are unremarkable.  BUN 72, creatinine 2.0. 



IMPRESSION:  COVID pneumonia with hypoxemia.  Given her fatigue, the appearance

would not be unreasonable.  Switch her to BiPAP.  In my clinical opinion, she 
does

not need intubation at this point in time.  I will follow the other physicians

caring for.  She is on steroids.  She was anticoagulated.  She has had 
convalescent

plasma.  She is on empiric antimicrobial therapy. 



This is a 50 min consult with greater than 50% of the time spent on unit with 
coordination of care.



Job ID:  [id number]



[facility]

## 2020-10-18 LAB
ANION GAP SERPL CALC-SCNC: 16 MMOL/L (ref 10–20)
BASOPHILS # BLD AUTO: 0.1 THOU/UL (ref 0–0.2)
BASOPHILS NFR BLD AUTO: 0.5 % (ref 0–1)
BUN SERPL-MCNC: 76 MG/DL (ref 9.8–20.1)
CALCIUM SERPL-MCNC: 7.5 MG/DL (ref 7.8–10.44)
CHLORIDE SERPL-SCNC: 102 MMOL/L (ref 98–107)
CO2 SERPL-SCNC: 19 MMOL/L (ref 23–31)
CREAT CL PREDICTED SERPL C-G-VRATE: 38 ML/MIN (ref 70–130)
EOSINOPHIL # BLD AUTO: 0 THOU/UL (ref 0–0.7)
EOSINOPHIL NFR BLD AUTO: 0.2 % (ref 0–10)
GLUCOSE SERPL-MCNC: 121 MG/DL (ref 80–115)
HGB BLD-MCNC: 10.2 G/DL (ref 12–16)
LYMPHOCYTES # BLD: 0.9 THOU/UL (ref 1.2–3.4)
LYMPHOCYTES NFR BLD AUTO: 6 % (ref 21–51)
MCH RBC QN AUTO: 30.1 PG (ref 27–31)
MCV RBC AUTO: 86.9 FL (ref 78–98)
MONOCYTES # BLD AUTO: 0.5 THOU/UL (ref 0.11–0.59)
MONOCYTES NFR BLD AUTO: 3.4 % (ref 0–10)
NEUTROPHILS # BLD AUTO: 13.7 THOU/UL (ref 1.4–6.5)
NEUTROPHILS NFR BLD AUTO: 89.9 % (ref 42–75)
PLATELET # BLD AUTO: 233 THOU/UL (ref 130–400)
POTASSIUM SERPL-SCNC: 4.2 MMOL/L (ref 3.5–5.1)
RBC # BLD AUTO: 3.38 MILL/UL (ref 4.2–5.4)
SODIUM SERPL-SCNC: 133 MMOL/L (ref 136–145)
WBC # BLD AUTO: 15.3 THOU/UL (ref 4.8–10.8)

## 2020-10-18 PROCEDURE — 5A09357 ASSISTANCE WITH RESPIRATORY VENTILATION, LESS THAN 24 CONSECUTIVE HOURS, CONTINUOUS POSITIVE AIRWAY PRESSURE: ICD-10-PCS | Performed by: INTERNAL MEDICINE

## 2020-10-18 RX ADMIN — DORZOLAMIDE HYDROCHLORIDE AND TIMOLOL MALEATE SCH EACH: 22.3; 6.8 SOLUTION/ DROPS OPHTHALMIC at 21:07

## 2020-10-18 RX ADMIN — Medication SCH MG: at 21:17

## 2020-10-18 RX ADMIN — AZITHROMYCIN SCH MLS: 500 INJECTION, POWDER, LYOPHILIZED, FOR SOLUTION INTRAVENOUS at 11:48

## 2020-10-18 RX ADMIN — BRIMONIDINE TARTRATE SCH DROP: 2 SOLUTION OPHTHALMIC at 21:07

## 2020-10-18 RX ADMIN — SODIUM BICARBONATE SCH MG: 325 TABLET ORAL at 14:23

## 2020-10-18 RX ADMIN — INSULIN GLARGINE SCH: 100 INJECTION, SOLUTION SUBCUTANEOUS at 21:07

## 2020-10-18 RX ADMIN — POLYETHYLENE GLYCOL 400 AND PROPYLENE GLYCOL SCH EACH: 4; 3 SOLUTION/ DROPS OPHTHALMIC at 08:32

## 2020-10-18 RX ADMIN — Medication SCH ML: at 08:32

## 2020-10-18 RX ADMIN — SODIUM BICARBONATE SCH MG: 325 TABLET ORAL at 21:15

## 2020-10-18 RX ADMIN — Medication SCH UNITS: at 08:30

## 2020-10-18 RX ADMIN — SODIUM BICARBONATE SCH MG: 325 TABLET ORAL at 08:32

## 2020-10-18 RX ADMIN — DORZOLAMIDE HYDROCHLORIDE AND TIMOLOL MALEATE SCH EACH: 22.3; 6.8 SOLUTION/ DROPS OPHTHALMIC at 08:29

## 2020-10-18 RX ADMIN — POLYVINYL ALCOHOL, POVIDONE SCH EACH: 14; 6 SOLUTION/ DROPS OPHTHALMIC at 08:32

## 2020-10-18 RX ADMIN — Medication SCH MG: at 08:38

## 2020-10-18 RX ADMIN — DORZOLAMIDE HYDROCHLORIDE AND TIMOLOL MALEATE SCH EACH: 22.3; 6.8 SOLUTION/ DROPS OPHTHALMIC at 14:22

## 2020-10-18 RX ADMIN — BRIMONIDINE TARTRATE SCH DROP: 2 SOLUTION OPHTHALMIC at 08:29

## 2020-10-18 RX ADMIN — INSULIN GLARGINE SCH MLS: 100 INJECTION, SOLUTION SUBCUTANEOUS at 08:31

## 2020-10-18 RX ADMIN — POLYETHYLENE GLYCOL 400 AND PROPYLENE GLYCOL SCH EACH: 4; 3 SOLUTION/ DROPS OPHTHALMIC at 21:07

## 2020-10-18 RX ADMIN — Medication SCH ML: at 21:07

## 2020-10-18 NOTE — PRG
DATE OF SERVICE:  10/18/2020



SUBJECTIVE:  Ms. Sanches remains on high-flow. 



Vital signs remained stable.  She only tolerated BiPAP for about an hour yesterday,

was taken back to high-flow.  She says she is a little more comfortable today. 



OBJECTIVE:  LUNGS:  Unchanged. 

HEART:  Unchanged. 

ABDOMEN:  Unchanged.



LABORATORY DATA:  White count 15.3, hemoglobin 10.2, and platelets 233.  Sodium 133,

potassium 4.2, chloride 102, bicarb 19, BUN 76, and creatinine 1.91. 



IMPRESSION:  

1. COVID pneumonia.

2. Acute on chronic kidney disease, improved.

3. Diabetes.

4. Deconditioning.



PLAN:  Continue current supportive care.







Job ID:  [id number]

## 2020-10-19 LAB
ANION GAP SERPL CALC-SCNC: 16 MMOL/L (ref 10–20)
BASOPHILS # BLD AUTO: 0.1 THOU/UL (ref 0–0.2)
BASOPHILS NFR BLD AUTO: 0.3 % (ref 0–1)
BUN SERPL-MCNC: 72 MG/DL (ref 9.8–20.1)
CALCIUM SERPL-MCNC: 7.7 MG/DL (ref 7.8–10.44)
CHLORIDE SERPL-SCNC: 104 MMOL/L (ref 98–107)
CO2 SERPL-SCNC: 18 MMOL/L (ref 23–31)
CREAT CL PREDICTED SERPL C-G-VRATE: 42 ML/MIN (ref 70–130)
EOSINOPHIL # BLD AUTO: 0 THOU/UL (ref 0–0.7)
EOSINOPHIL NFR BLD AUTO: 0.1 % (ref 0–10)
GLUCOSE SERPL-MCNC: 59 MG/DL (ref 80–115)
HGB BLD-MCNC: 10.5 G/DL (ref 12–16)
LYMPHOCYTES # BLD: 0.8 THOU/UL (ref 1.2–3.4)
LYMPHOCYTES NFR BLD AUTO: 4.3 % (ref 21–51)
MCH RBC QN AUTO: 30.2 PG (ref 27–31)
MCV RBC AUTO: 88.3 FL (ref 78–98)
MONOCYTES # BLD AUTO: 0.5 THOU/UL (ref 0.11–0.59)
MONOCYTES NFR BLD AUTO: 2.8 % (ref 0–10)
NEUTROPHILS # BLD AUTO: 17.4 THOU/UL (ref 1.4–6.5)
NEUTROPHILS NFR BLD AUTO: 92.5 % (ref 42–75)
PLATELET # BLD AUTO: 251 THOU/UL (ref 130–400)
POTASSIUM SERPL-SCNC: 4.3 MMOL/L (ref 3.5–5.1)
RBC # BLD AUTO: 3.49 MILL/UL (ref 4.2–5.4)
SODIUM SERPL-SCNC: 134 MMOL/L (ref 136–145)
WBC # BLD AUTO: 18.8 THOU/UL (ref 4.8–10.8)

## 2020-10-19 RX ADMIN — INSULIN GLARGINE SCH: 100 INJECTION, SOLUTION SUBCUTANEOUS at 23:19

## 2020-10-19 RX ADMIN — POLYETHYLENE GLYCOL 400 AND PROPYLENE GLYCOL SCH EACH: 4; 3 SOLUTION/ DROPS OPHTHALMIC at 10:45

## 2020-10-19 RX ADMIN — Medication SCH ML: at 21:25

## 2020-10-19 RX ADMIN — BRIMONIDINE TARTRATE SCH DROP: 2 SOLUTION OPHTHALMIC at 21:24

## 2020-10-19 RX ADMIN — SODIUM BICARBONATE SCH MG: 325 TABLET ORAL at 15:15

## 2020-10-19 RX ADMIN — DORZOLAMIDE HYDROCHLORIDE AND TIMOLOL MALEATE SCH EACH: 22.3; 6.8 SOLUTION/ DROPS OPHTHALMIC at 21:24

## 2020-10-19 RX ADMIN — POLYETHYLENE GLYCOL 400 AND PROPYLENE GLYCOL SCH EACH: 4; 3 SOLUTION/ DROPS OPHTHALMIC at 21:25

## 2020-10-19 RX ADMIN — Medication SCH ML: at 10:43

## 2020-10-19 RX ADMIN — BRIMONIDINE TARTRATE SCH DROP: 2 SOLUTION OPHTHALMIC at 10:44

## 2020-10-19 RX ADMIN — SODIUM BICARBONATE SCH MG: 325 TABLET ORAL at 21:28

## 2020-10-19 RX ADMIN — INSULIN GLARGINE SCH: 100 INJECTION, SOLUTION SUBCUTANEOUS at 10:45

## 2020-10-19 RX ADMIN — DORZOLAMIDE HYDROCHLORIDE AND TIMOLOL MALEATE SCH EACH: 22.3; 6.8 SOLUTION/ DROPS OPHTHALMIC at 15:18

## 2020-10-19 RX ADMIN — SODIUM BICARBONATE SCH MG: 325 TABLET ORAL at 10:40

## 2020-10-19 RX ADMIN — DEXTROSE MONOHYDRATE PRN GM: 25 INJECTION, SOLUTION INTRAVENOUS at 04:23

## 2020-10-19 RX ADMIN — Medication SCH UNITS: at 10:41

## 2020-10-19 RX ADMIN — Medication SCH MG: at 21:23

## 2020-10-19 RX ADMIN — AZITHROMYCIN SCH MLS: 500 INJECTION, POWDER, LYOPHILIZED, FOR SOLUTION INTRAVENOUS at 10:43

## 2020-10-19 RX ADMIN — DORZOLAMIDE HYDROCHLORIDE AND TIMOLOL MALEATE SCH EACH: 22.3; 6.8 SOLUTION/ DROPS OPHTHALMIC at 10:44

## 2020-10-19 RX ADMIN — POLYVINYL ALCOHOL, POVIDONE SCH EACH: 14; 6 SOLUTION/ DROPS OPHTHALMIC at 10:40

## 2020-10-19 RX ADMIN — Medication SCH MG: at 10:44

## 2020-10-19 NOTE — PRG
DATE OF SERVICE:  10/19/2020



SUBJECTIVE:  Ke Sanches remains stable.  She is being bagged and sitting on a

chair at the bedside. 



OBJECTIVE:  VITAL SIGNS:  She is afebrile.  Heart rate is in the 70s.  Blood

pressure has been stable.  Respiratory rates in the high 20s.  She is still on

high-flow oxygen. 

LUNGS:  Unchanged. 

HEART:  Unchanged. 

ABDOMEN:  Unchanged.



LABORATORY DATA:  White count 18.8, hemoglobin 10.5, platelets 251.  Sodium 134,

potassium 4.3, chloride 104, bicarb 18, BUN 72, creatinine 1.74. 



IMPRESSION:  

1. COVID pneumonia.

2. Acute on chronic kidney disease, improved.



PLAN:  Continue supportive care as I have explained this will be a slow process.







Job ID:  [id number]

## 2020-10-20 LAB
ANION GAP SERPL CALC-SCNC: 17 MMOL/L (ref 10–20)
BUN SERPL-MCNC: 66 MG/DL (ref 9.8–20.1)
CALCIUM SERPL-MCNC: 7.5 MG/DL (ref 7.8–10.44)
CHLORIDE SERPL-SCNC: 106 MMOL/L (ref 98–107)
CO2 SERPL-SCNC: 16 MMOL/L (ref 23–31)
CREAT CL PREDICTED SERPL C-G-VRATE: 41 ML/MIN (ref 70–130)
GLUCOSE SERPL-MCNC: 151 MG/DL (ref 80–115)
HGB BLD-MCNC: 10.9 G/DL (ref 12–16)
MCH RBC QN AUTO: 31.5 PG (ref 27–31)
MCV RBC AUTO: 88.4 FL (ref 78–98)
MDIFF COMPLETE?: YES
PLATELET # BLD AUTO: 226 THOU/UL (ref 130–400)
POTASSIUM SERPL-SCNC: 5.3 MMOL/L (ref 3.5–5.1)
RBC # BLD AUTO: 3.45 MILL/UL (ref 4.2–5.4)
SODIUM SERPL-SCNC: 134 MMOL/L (ref 136–145)
WBC # BLD AUTO: 20.7 THOU/UL (ref 4.8–10.8)

## 2020-10-20 RX ADMIN — INSULIN GLARGINE SCH: 100 INJECTION, SOLUTION SUBCUTANEOUS at 20:51

## 2020-10-20 RX ADMIN — POLYVINYL ALCOHOL, POVIDONE SCH EACH: 14; 6 SOLUTION/ DROPS OPHTHALMIC at 08:11

## 2020-10-20 RX ADMIN — SODIUM BICARBONATE SCH MG: 325 TABLET ORAL at 20:11

## 2020-10-20 RX ADMIN — DORZOLAMIDE HYDROCHLORIDE AND TIMOLOL MALEATE SCH EACH: 22.3; 6.8 SOLUTION/ DROPS OPHTHALMIC at 20:14

## 2020-10-20 RX ADMIN — SODIUM BICARBONATE SCH MG: 325 TABLET ORAL at 15:52

## 2020-10-20 RX ADMIN — Medication SCH ML: at 20:15

## 2020-10-20 RX ADMIN — DORZOLAMIDE HYDROCHLORIDE AND TIMOLOL MALEATE SCH EACH: 22.3; 6.8 SOLUTION/ DROPS OPHTHALMIC at 15:42

## 2020-10-20 RX ADMIN — POLYETHYLENE GLYCOL 400 AND PROPYLENE GLYCOL SCH EACH: 4; 3 SOLUTION/ DROPS OPHTHALMIC at 20:14

## 2020-10-20 RX ADMIN — INSULIN GLARGINE SCH: 100 INJECTION, SOLUTION SUBCUTANEOUS at 15:03

## 2020-10-20 RX ADMIN — AZITHROMYCIN SCH MLS: 500 INJECTION, POWDER, LYOPHILIZED, FOR SOLUTION INTRAVENOUS at 08:11

## 2020-10-20 RX ADMIN — Medication SCH MG: at 08:06

## 2020-10-20 RX ADMIN — VANCOMYCIN HYDROCHLORIDE SCH MLS: 1 INJECTION, POWDER, LYOPHILIZED, FOR SOLUTION INTRAVENOUS at 21:50

## 2020-10-20 RX ADMIN — DORZOLAMIDE HYDROCHLORIDE AND TIMOLOL MALEATE SCH EACH: 22.3; 6.8 SOLUTION/ DROPS OPHTHALMIC at 08:10

## 2020-10-20 RX ADMIN — BRIMONIDINE TARTRATE SCH DROP: 2 SOLUTION OPHTHALMIC at 20:14

## 2020-10-20 RX ADMIN — MEROPENEM AND SODIUM CHLORIDE SCH MLS: 1 INJECTION, SOLUTION INTRAVENOUS at 21:51

## 2020-10-20 RX ADMIN — Medication SCH UNITS: at 08:07

## 2020-10-20 RX ADMIN — SODIUM BICARBONATE SCH MG: 325 TABLET ORAL at 08:07

## 2020-10-20 RX ADMIN — Medication SCH ML: at 08:07

## 2020-10-20 RX ADMIN — BRIMONIDINE TARTRATE SCH DROP: 2 SOLUTION OPHTHALMIC at 08:08

## 2020-10-20 RX ADMIN — Medication SCH MG: at 20:11

## 2020-10-20 RX ADMIN — POLYETHYLENE GLYCOL 400 AND PROPYLENE GLYCOL SCH EACH: 4; 3 SOLUTION/ DROPS OPHTHALMIC at 08:08

## 2020-10-20 NOTE — PRG
DATE OF SERVICE:  10/20/2020



SUBJECTIVE:  Ke Sanches remains on high-flow.



OBJECTIVE:  VITAL SIGNS:  __________ mid 80s, in the mid 90s.  Blood pressure is

165/101, earlier 174/93.  Heart rate is in the 90s. 

LUNGS:  Unchanged. 

HEART:  Unchanged. 

ABDOMEN:  Unchanged.



LABORATORY DATA:  White count 20.7, hemoglobin 10.9, platelets 226.  Sodium 134,

potassium 5.3, chloride 106, bicarb 16, anion gap is 12, BUN 66, creatinine 1.75

down from a high of 2.37 from 13th. 



IMPRESSION:  

1. Diabetes.

2. COVID pneumonia.

3. Acute on chronic kidney disease. 

She still has diffuse infiltrates on chest x-ray, __________ slow progress as

expected with her pneumonia.  It is very likely she will end up intubated at some

point once she starts turning the corner. 







Job ID:  [id number]

## 2020-10-20 NOTE — RAD
Exam: Chest one view



HISTORY:Worsening hypoxia. COVID pneumonia.



Comparison: 10/14/2020



FINDINGS:

Cardiac silhouette: Normal

Aorta: Unremarkable

Pulmonary vessels: Normal

Costophrenic angles: Clear



LUNGS: Worsening interstitial and alveolar opacities.

Pneumothorax: None



Osseous abnormalities: None



IMPRESSION: Worsening COVID pneumonia.



Reported By: Ben Washington 

Electronically Signed:  10/20/2020 4:27 PM

## 2020-10-21 LAB
ANION GAP SERPL CALC-SCNC: 17 MMOL/L (ref 10–20)
BASOPHILS # BLD AUTO: 0 THOU/UL (ref 0–0.2)
BASOPHILS NFR BLD AUTO: 0.3 % (ref 0–1)
BUN SERPL-MCNC: 66 MG/DL (ref 9.8–20.1)
CALCIUM SERPL-MCNC: 7.7 MG/DL (ref 7.8–10.44)
CHLORIDE SERPL-SCNC: 109 MMOL/L (ref 98–107)
CO2 SERPL-SCNC: 15 MMOL/L (ref 23–31)
CREAT CL PREDICTED SERPL C-G-VRATE: 46 ML/MIN (ref 70–130)
EOSINOPHIL # BLD AUTO: 0 THOU/UL (ref 0–0.7)
EOSINOPHIL NFR BLD AUTO: 0 % (ref 0–10)
GLUCOSE SERPL-MCNC: 129 MG/DL (ref 80–115)
HGB BLD-MCNC: 10.4 G/DL (ref 12–16)
LYMPHOCYTES # BLD: 0.4 THOU/UL (ref 1.2–3.4)
LYMPHOCYTES NFR BLD AUTO: 2.2 % (ref 21–51)
MCH RBC QN AUTO: 30.7 PG (ref 27–31)
MCV RBC AUTO: 87.7 FL (ref 78–98)
MONOCYTES # BLD AUTO: 0.4 THOU/UL (ref 0.11–0.59)
MONOCYTES NFR BLD AUTO: 2.3 % (ref 0–10)
NEUTROPHILS # BLD AUTO: 17.5 THOU/UL (ref 1.4–6.5)
NEUTROPHILS NFR BLD AUTO: 95.2 % (ref 42–75)
PLATELET # BLD AUTO: 197 THOU/UL (ref 130–400)
POTASSIUM SERPL-SCNC: 4.8 MMOL/L (ref 3.5–5.1)
RBC # BLD AUTO: 3.38 MILL/UL (ref 4.2–5.4)
SODIUM SERPL-SCNC: 136 MMOL/L (ref 136–145)
WBC # BLD AUTO: 18.3 THOU/UL (ref 4.8–10.8)

## 2020-10-21 RX ADMIN — Medication SCH ML: at 09:35

## 2020-10-21 RX ADMIN — Medication SCH UNITS: at 09:34

## 2020-10-21 RX ADMIN — MEROPENEM AND SODIUM CHLORIDE SCH MLS: 1 INJECTION, SOLUTION INTRAVENOUS at 20:32

## 2020-10-21 RX ADMIN — SODIUM BICARBONATE SCH MG: 325 TABLET ORAL at 20:29

## 2020-10-21 RX ADMIN — Medication SCH ML: at 20:32

## 2020-10-21 RX ADMIN — SODIUM BICARBONATE SCH MG: 325 TABLET ORAL at 09:35

## 2020-10-21 RX ADMIN — DORZOLAMIDE HYDROCHLORIDE AND TIMOLOL MALEATE SCH EACH: 22.3; 6.8 SOLUTION/ DROPS OPHTHALMIC at 14:17

## 2020-10-21 RX ADMIN — INSULIN GLARGINE SCH MLS: 100 INJECTION, SOLUTION SUBCUTANEOUS at 09:34

## 2020-10-21 RX ADMIN — MEROPENEM AND SODIUM CHLORIDE SCH MLS: 1 INJECTION, SOLUTION INTRAVENOUS at 09:37

## 2020-10-21 RX ADMIN — DORZOLAMIDE HYDROCHLORIDE AND TIMOLOL MALEATE SCH EACH: 22.3; 6.8 SOLUTION/ DROPS OPHTHALMIC at 09:33

## 2020-10-21 RX ADMIN — POLYETHYLENE GLYCOL 400 AND PROPYLENE GLYCOL SCH EACH: 4; 3 SOLUTION/ DROPS OPHTHALMIC at 09:33

## 2020-10-21 RX ADMIN — VANCOMYCIN HYDROCHLORIDE SCH MLS: 1 INJECTION, POWDER, LYOPHILIZED, FOR SOLUTION INTRAVENOUS at 20:32

## 2020-10-21 RX ADMIN — POLYVINYL ALCOHOL, POVIDONE SCH EACH: 14; 6 SOLUTION/ DROPS OPHTHALMIC at 09:35

## 2020-10-21 RX ADMIN — BRIMONIDINE TARTRATE SCH DROP: 2 SOLUTION OPHTHALMIC at 20:31

## 2020-10-21 RX ADMIN — Medication SCH MG: at 09:33

## 2020-10-21 RX ADMIN — SODIUM BICARBONATE SCH MG: 325 TABLET ORAL at 14:17

## 2020-10-21 RX ADMIN — BRIMONIDINE TARTRATE SCH DROP: 2 SOLUTION OPHTHALMIC at 09:33

## 2020-10-21 RX ADMIN — DORZOLAMIDE HYDROCHLORIDE AND TIMOLOL MALEATE SCH EACH: 22.3; 6.8 SOLUTION/ DROPS OPHTHALMIC at 20:31

## 2020-10-21 RX ADMIN — POLYETHYLENE GLYCOL 400 AND PROPYLENE GLYCOL SCH EACH: 4; 3 SOLUTION/ DROPS OPHTHALMIC at 20:32

## 2020-10-21 RX ADMIN — Medication SCH MG: at 20:28

## 2020-10-21 NOTE — PRG
DATE OF SERVICE:  10/21/2020



SUBJECTIVE:  Ms. Sanches is in the IMCU.  She is on high-flow O2 and she is quite

alert, sitting by the bedside.  Does not have dyspnea.  No chest pain.  No 
abdominal

pain.  No diarrhea.  She has been afebrile. 



OBJECTIVE:  VITAL SIGNS:  Heart rate is 80, blood pressure 160/50, and 
saturating at

100% with a flow rate of 50. 

LUNGS:  Faint crackles at the bases. 

HEART:  S1 and S2, regular rate. 

ABDOMEN:  Soft, not distended.



LABORATORY DATA:  White cell count is 18,000, hemoglobin 10.4, platelets 197 
with

95% neutrophils.  Creatinine 1.57, which is improved and microbiology 

pneumonia from the 14th and blood cultures negative.  The chest x-ray with 
diffuse

bilateral infiltrates, slightly improved. 



ASSESSMENT AND DISCUSSION:  Type 2 diabetes, hypertension, congestive heart 
failure

systolic, recurrent urinary tract infections, severe COVID pneumonia managed 
with

anti-inflammatories mostly.  She is getting broad-spectrum coverage as well 
still.

May be some early improvement now. 







Job ID:  [id number]



[facility]

## 2020-10-21 NOTE — PRG
DATE OF SERVICE:  10/21/2020



SUBJECTIVE:  Ke Sanches remains on high flow, sitting in the chair at

bedside.  She really looks about the same as yesterday. 



OBJECTIVE:  VITAL SIGNS:  Blood pressure 157/68, heart rate is in the 70s,

respiratory rates in the 20s, oximetry is in mid-to-high 90s. 

LUNGS:  Otherwise unchanged. 

HEART:  Otherwise unchanged. 

ABDOMEN:  Otherwise unchanged.



IMPRESSION:  COVID pneumonia, making slow progress.



PLAN:  

1. Continue supportive care.

2. Status post attempted BiPAP.  She did not tolerate BiPAP.  We will continue with

high-flow oxygen. 







Job ID:  [id number]

## 2020-10-22 LAB
ANION GAP SERPL CALC-SCNC: 18 MMOL/L (ref 10–20)
BUN SERPL-MCNC: 67 MG/DL (ref 9.8–20.1)
CALCIUM SERPL-MCNC: 7.8 MG/DL (ref 7.8–10.44)
CHLORIDE SERPL-SCNC: 112 MMOL/L (ref 98–107)
CO2 SERPL-SCNC: 12 MMOL/L (ref 23–31)
CREAT CL PREDICTED SERPL C-G-VRATE: 45 ML/MIN (ref 70–130)
GLUCOSE SERPL-MCNC: 129 MG/DL (ref 80–115)
HGB BLD-MCNC: 10.3 G/DL (ref 12–16)
MCH RBC QN AUTO: 30 PG (ref 27–31)
MCV RBC AUTO: 90.8 FL (ref 78–98)
MDIFF COMPLETE?: YES
PLATELET # BLD AUTO: 188 THOU/UL (ref 130–400)
POLYCHROMASIA BLD QL SMEAR: (no result) (100X)
POTASSIUM SERPL-SCNC: 5 MMOL/L (ref 3.5–5.1)
RBC # BLD AUTO: 3.42 MILL/UL (ref 4.2–5.4)
SODIUM SERPL-SCNC: 137 MMOL/L (ref 136–145)
VANCOMYCIN TROUGH SERPL-MCNC: 13.7 UG/ML
WBC # BLD AUTO: 22.7 THOU/UL (ref 4.8–10.8)

## 2020-10-22 RX ADMIN — POLYETHYLENE GLYCOL 400 AND PROPYLENE GLYCOL SCH EACH: 4; 3 SOLUTION/ DROPS OPHTHALMIC at 08:30

## 2020-10-22 RX ADMIN — DORZOLAMIDE HYDROCHLORIDE AND TIMOLOL MALEATE SCH EACH: 22.3; 6.8 SOLUTION/ DROPS OPHTHALMIC at 08:28

## 2020-10-22 RX ADMIN — DORZOLAMIDE HYDROCHLORIDE AND TIMOLOL MALEATE SCH EACH: 22.3; 6.8 SOLUTION/ DROPS OPHTHALMIC at 14:25

## 2020-10-22 RX ADMIN — Medication SCH ML: at 08:31

## 2020-10-22 RX ADMIN — MEROPENEM AND SODIUM CHLORIDE SCH MLS: 1 INJECTION, SOLUTION INTRAVENOUS at 11:17

## 2020-10-22 RX ADMIN — Medication SCH ML: at 21:39

## 2020-10-22 RX ADMIN — DORZOLAMIDE HYDROCHLORIDE AND TIMOLOL MALEATE SCH EACH: 22.3; 6.8 SOLUTION/ DROPS OPHTHALMIC at 22:16

## 2020-10-22 RX ADMIN — INSULIN GLARGINE SCH MLS: 100 INJECTION, SOLUTION SUBCUTANEOUS at 08:29

## 2020-10-22 RX ADMIN — POLYVINYL ALCOHOL, POVIDONE SCH EACH: 14; 6 SOLUTION/ DROPS OPHTHALMIC at 08:30

## 2020-10-22 RX ADMIN — INSULIN GLARGINE SCH: 100 INJECTION, SOLUTION SUBCUTANEOUS at 00:39

## 2020-10-22 RX ADMIN — SODIUM BICARBONATE SCH MG: 325 TABLET ORAL at 08:30

## 2020-10-22 RX ADMIN — MEROPENEM AND SODIUM CHLORIDE SCH MLS: 1 INJECTION, SOLUTION INTRAVENOUS at 21:36

## 2020-10-22 RX ADMIN — INSULIN GLARGINE SCH: 100 INJECTION, SOLUTION SUBCUTANEOUS at 21:36

## 2020-10-22 RX ADMIN — BRIMONIDINE TARTRATE SCH DROP: 2 SOLUTION OPHTHALMIC at 22:16

## 2020-10-22 RX ADMIN — BRIMONIDINE TARTRATE SCH DROP: 2 SOLUTION OPHTHALMIC at 08:27

## 2020-10-22 RX ADMIN — Medication SCH UNITS: at 08:28

## 2020-10-22 RX ADMIN — Medication SCH MG: at 21:38

## 2020-10-22 RX ADMIN — SODIUM BICARBONATE SCH MG: 325 TABLET ORAL at 14:25

## 2020-10-22 RX ADMIN — SODIUM BICARBONATE SCH MG: 325 TABLET ORAL at 21:37

## 2020-10-22 RX ADMIN — Medication SCH MG: at 08:27

## 2020-10-22 RX ADMIN — POLYETHYLENE GLYCOL 400 AND PROPYLENE GLYCOL SCH EACH: 4; 3 SOLUTION/ DROPS OPHTHALMIC at 22:17

## 2020-10-22 RX ADMIN — VANCOMYCIN HYDROCHLORIDE SCH MLS: 1 INJECTION, POWDER, LYOPHILIZED, FOR SOLUTION INTRAVENOUS at 21:37

## 2020-10-22 NOTE — PRG
DATE OF SERVICE:  10/22/2020



Ms. Sanches has not gotten any better, maybe is a little worse.  Heart rates in the

80s.  She is intermittently agitated.  She is on high-flow, this afternoon was

switched to BiPAP. 



Haldol intramuscularly led to 30 minutes of a calm state, where her oxygenation

improved significantly.  She is agitated again. 



We will try 10 mg of IV Haldol and start her on a Precedex drip.  Hopefully, we can

avoid intubation, but it is not clear at this time. 







Job ID:  [id number]

## 2020-10-23 LAB
ANALYZER IN CARDIO: (no result)
ANION GAP SERPL CALC-SCNC: 14 MMOL/L (ref 10–20)
BASE EXCESS STD BLDA CALC-SCNC: -9.7 MEQ/L
BASOPHILS # BLD AUTO: 0 THOU/UL (ref 0–0.2)
BASOPHILS NFR BLD AUTO: 0 % (ref 0–1)
BUN SERPL-MCNC: 71 MG/DL (ref 9.8–20.1)
CA-I BLDA-SCNC: 1.16 MMOL/L (ref 1.12–1.3)
CALCIUM SERPL-MCNC: 7.2 MG/DL (ref 7.8–10.44)
CHLORIDE SERPL-SCNC: 117 MMOL/L (ref 98–107)
CO2 SERPL-SCNC: 17 MMOL/L (ref 23–31)
CREAT CL PREDICTED SERPL C-G-VRATE: 46 ML/MIN (ref 70–130)
EOSINOPHIL # BLD AUTO: 0 THOU/UL (ref 0–0.7)
EOSINOPHIL NFR BLD AUTO: 0 % (ref 0–10)
GLUCOSE SERPL-MCNC: 101 MG/DL (ref 80–115)
HCO3 BLDA-SCNC: 15.7 MEQ/L (ref 22–28)
HCT VFR BLDA CALC: 29 % (ref 36–47)
HGB BLD-MCNC: 8.2 G/DL (ref 12–16)
HGB BLDA-MCNC: 9.7 G/DL (ref 12–16)
LYMPHOCYTES # BLD: 0.4 THOU/UL (ref 1.2–3.4)
LYMPHOCYTES NFR BLD AUTO: 1.9 % (ref 21–51)
MCH RBC QN AUTO: 29.6 PG (ref 27–31)
MCV RBC AUTO: 92 FL (ref 78–98)
MONOCYTES # BLD AUTO: 0.4 THOU/UL (ref 0.11–0.59)
MONOCYTES NFR BLD AUTO: 1.8 % (ref 0–10)
NEUTROPHILS # BLD AUTO: 22.5 THOU/UL (ref 1.4–6.5)
NEUTROPHILS NFR BLD AUTO: 96.2 % (ref 42–75)
PCO2 BLDA: 32.4 MMHG (ref 35–45)
PH BLDA: 7.3 [PH] (ref 7.35–7.45)
PLATELET # BLD AUTO: 94 THOU/UL (ref 130–400)
PO2 BLDA: 57.8 MMHG (ref 80–?)
POTASSIUM BLD-SCNC: 4.81 MMOL/L (ref 3.7–5.3)
POTASSIUM SERPL-SCNC: 5 MMOL/L (ref 3.5–5.1)
RBC # BLD AUTO: 2.77 MILL/UL (ref 4.2–5.4)
SODIUM SERPL-SCNC: 143 MMOL/L (ref 136–145)
SPECIMEN DRAWN FROM PATIENT: (no result)
WBC # BLD AUTO: 23.4 THOU/UL (ref 4.8–10.8)

## 2020-10-23 PROCEDURE — 0BH17EZ INSERTION OF ENDOTRACHEAL AIRWAY INTO TRACHEA, VIA NATURAL OR ARTIFICIAL OPENING: ICD-10-PCS | Performed by: INTERNAL MEDICINE

## 2020-10-23 PROCEDURE — 5A1955Z RESPIRATORY VENTILATION, GREATER THAN 96 CONSECUTIVE HOURS: ICD-10-PCS | Performed by: INTERNAL MEDICINE

## 2020-10-23 RX ADMIN — POLYETHYLENE GLYCOL 400 AND PROPYLENE GLYCOL SCH EACH: 4; 3 SOLUTION/ DROPS OPHTHALMIC at 20:36

## 2020-10-23 RX ADMIN — FENTANYL CITRATE SCH MLS: 50 INJECTION, SOLUTION INTRAMUSCULAR; INTRAVENOUS at 09:53

## 2020-10-23 RX ADMIN — Medication SCH UNITS: at 11:22

## 2020-10-23 RX ADMIN — BRIMONIDINE TARTRATE SCH DROP: 2 SOLUTION OPHTHALMIC at 11:22

## 2020-10-23 RX ADMIN — SODIUM BICARBONATE SCH MG: 325 TABLET ORAL at 14:55

## 2020-10-23 RX ADMIN — MEROPENEM AND SODIUM CHLORIDE SCH MLS: 1 INJECTION, SOLUTION INTRAVENOUS at 20:37

## 2020-10-23 RX ADMIN — DORZOLAMIDE HYDROCHLORIDE AND TIMOLOL MALEATE SCH EACH: 22.3; 6.8 SOLUTION/ DROPS OPHTHALMIC at 14:57

## 2020-10-23 RX ADMIN — POLYETHYLENE GLYCOL 400 AND PROPYLENE GLYCOL SCH EACH: 4; 3 SOLUTION/ DROPS OPHTHALMIC at 11:24

## 2020-10-23 RX ADMIN — POLYVINYL ALCOHOL, POVIDONE SCH EACH: 14; 6 SOLUTION/ DROPS OPHTHALMIC at 11:23

## 2020-10-23 RX ADMIN — SODIUM BICARBONATE SCH MG: 325 TABLET ORAL at 11:24

## 2020-10-23 RX ADMIN — DORZOLAMIDE HYDROCHLORIDE AND TIMOLOL MALEATE SCH EACH: 22.3; 6.8 SOLUTION/ DROPS OPHTHALMIC at 20:38

## 2020-10-23 RX ADMIN — Medication SCH ML: at 20:36

## 2020-10-23 RX ADMIN — INSULIN GLARGINE SCH MLS: 100 INJECTION, SOLUTION SUBCUTANEOUS at 20:28

## 2020-10-23 RX ADMIN — MEROPENEM AND SODIUM CHLORIDE SCH MLS: 1 INJECTION, SOLUTION INTRAVENOUS at 09:51

## 2020-10-23 RX ADMIN — INSULIN GLARGINE SCH: 100 INJECTION, SOLUTION SUBCUTANEOUS at 09:57

## 2020-10-23 RX ADMIN — DORZOLAMIDE HYDROCHLORIDE AND TIMOLOL MALEATE SCH EACH: 22.3; 6.8 SOLUTION/ DROPS OPHTHALMIC at 11:22

## 2020-10-23 RX ADMIN — Medication SCH MG: at 09:56

## 2020-10-23 RX ADMIN — BRIMONIDINE TARTRATE SCH DROP: 2 SOLUTION OPHTHALMIC at 20:38

## 2020-10-23 RX ADMIN — Medication SCH MG: at 20:31

## 2020-10-23 RX ADMIN — SODIUM BICARBONATE SCH MG: 325 TABLET ORAL at 20:31

## 2020-10-23 RX ADMIN — Medication SCH ML: at 11:24

## 2020-10-23 NOTE — RAD
EXAM: Single view of the chest



HISTORY:   Hypoxia



COMPARISON: 10/20/2020



FINDINGS: Single view of the chest shows a normal sized cardiomediastinal silhouette.  Multifocal sca
ttered airspace opacities are seen in the lungs. No obvious pleural effusion is seen. An

endotracheal tube is seen in good position above the lucas. An NG tube is seen in the stomach. Degen
erative changes are seen in the spine.



IMPRESSION: 

1. Appropriate position of endotracheal tube and NG tube

2. Multifocal infiltrates



Reported By: Dat Marquez 

Electronically Signed:  10/23/2020 10:10 AM

## 2020-10-23 NOTE — PRG
DATE OF SERVICE:  10/23/2020



Ms. Sanches is starting to look fatigued. 



She is transferred to the ICU to be intubated today.



OBJECTIVE:  VITAL SIGNS:  Blood pressure  126/62 heart rate 104, respiratory rate

was in the 20s, but she  __________ muscle fatigue. 

LUNGS: Coarse equal breath sounds. 

HEART: Regular rhythm. 

ABDOMEN: Soft.



LABORATORY DATA:  White count 22.7 yesterday.  Electrolytes remarkable for

creatinine elevated yesterday  __________ blood gas. 



Prognosis is quite guarded.  I discussed intubation with her  and she would like to

proceed with intubation. 







Job ID:  [id number]

## 2020-10-24 LAB
ANALYZER IN CARDIO: (no result)
ANION GAP SERPL CALC-SCNC: 14 MMOL/L (ref 10–20)
BASE EXCESS STD BLDA CALC-SCNC: -10.2 MEQ/L
BUN SERPL-MCNC: 85 MG/DL (ref 9.8–20.1)
CA-I BLDA-SCNC: 1.19 MMOL/L (ref 1.12–1.3)
CALCIUM SERPL-MCNC: 7.6 MG/DL (ref 7.8–10.44)
CHLORIDE SERPL-SCNC: 118 MMOL/L (ref 98–107)
CO2 SERPL-SCNC: 16 MMOL/L (ref 23–31)
CREAT CL PREDICTED SERPL C-G-VRATE: 39 ML/MIN (ref 70–130)
GLUCOSE SERPL-MCNC: 117 MG/DL (ref 80–115)
HCO3 BLDA-SCNC: 16.8 MEQ/L (ref 22–28)
HCT VFR BLDA CALC: 24 % (ref 36–47)
HGB BLD-MCNC: 7.8 G/DL (ref 12–16)
HGB BLDA-MCNC: 8.2 G/DL (ref 12–16)
MCH RBC QN AUTO: 31.1 PG (ref 27–31)
MCV RBC AUTO: 94.8 FL (ref 78–98)
MDIFF COMPLETE?: YES
PCO2 BLDA: 41.8 MMHG (ref 35–45)
PH BLDA: 7.22 [PH] (ref 7.35–7.45)
PLATELET # BLD AUTO: 84 THOU/UL (ref 130–400)
PO2 BLDA: 78.7 MMHG (ref 80–?)
POTASSIUM BLD-SCNC: 5.51 MMOL/L (ref 3.7–5.3)
POTASSIUM SERPL-SCNC: 5.5 MMOL/L (ref 3.5–5.1)
POTASSIUM SERPL-SCNC: 5.8 MMOL/L (ref 3.5–5.1)
RBC # BLD AUTO: 2.51 MILL/UL (ref 4.2–5.4)
SODIUM SERPL-SCNC: 142 MMOL/L (ref 136–145)
SPECIMEN DRAWN FROM PATIENT: (no result)
WBC # BLD AUTO: 26.3 THOU/UL (ref 4.8–10.8)

## 2020-10-24 RX ADMIN — DORZOLAMIDE HYDROCHLORIDE AND TIMOLOL MALEATE SCH EACH: 22.3; 6.8 SOLUTION/ DROPS OPHTHALMIC at 20:13

## 2020-10-24 RX ADMIN — MEROPENEM AND SODIUM CHLORIDE SCH MLS: 1 INJECTION, SOLUTION INTRAVENOUS at 20:50

## 2020-10-24 RX ADMIN — INSULIN GLARGINE SCH MLS: 100 INJECTION, SOLUTION SUBCUTANEOUS at 20:05

## 2020-10-24 RX ADMIN — INSULIN GLARGINE SCH MLS: 100 INJECTION, SOLUTION SUBCUTANEOUS at 08:20

## 2020-10-24 RX ADMIN — Medication SCH MG: at 08:22

## 2020-10-24 RX ADMIN — DORZOLAMIDE HYDROCHLORIDE AND TIMOLOL MALEATE SCH EACH: 22.3; 6.8 SOLUTION/ DROPS OPHTHALMIC at 09:08

## 2020-10-24 RX ADMIN — DORZOLAMIDE HYDROCHLORIDE AND TIMOLOL MALEATE SCH EACH: 22.3; 6.8 SOLUTION/ DROPS OPHTHALMIC at 14:34

## 2020-10-24 RX ADMIN — POLYVINYL ALCOHOL, POVIDONE SCH EACH: 14; 6 SOLUTION/ DROPS OPHTHALMIC at 08:24

## 2020-10-24 RX ADMIN — SODIUM BICARBONATE SCH MG: 325 TABLET ORAL at 20:03

## 2020-10-24 RX ADMIN — POLYETHYLENE GLYCOL 400 AND PROPYLENE GLYCOL SCH EACH: 4; 3 SOLUTION/ DROPS OPHTHALMIC at 09:08

## 2020-10-24 RX ADMIN — Medication SCH ML: at 20:04

## 2020-10-24 RX ADMIN — BRIMONIDINE TARTRATE SCH DROP: 2 SOLUTION OPHTHALMIC at 20:13

## 2020-10-24 RX ADMIN — SODIUM BICARBONATE SCH MG: 325 TABLET ORAL at 14:34

## 2020-10-24 RX ADMIN — Medication SCH MG: at 20:03

## 2020-10-24 RX ADMIN — Medication SCH UNITS: at 08:22

## 2020-10-24 RX ADMIN — BRIMONIDINE TARTRATE SCH DROP: 2 SOLUTION OPHTHALMIC at 09:08

## 2020-10-24 RX ADMIN — SODIUM BICARBONATE SCH MG: 325 TABLET ORAL at 08:22

## 2020-10-24 RX ADMIN — FENTANYL CITRATE SCH MLS: 50 INJECTION, SOLUTION INTRAMUSCULAR; INTRAVENOUS at 00:49

## 2020-10-24 RX ADMIN — POLYETHYLENE GLYCOL 400 AND PROPYLENE GLYCOL SCH EACH: 4; 3 SOLUTION/ DROPS OPHTHALMIC at 20:13

## 2020-10-24 RX ADMIN — FENTANYL CITRATE SCH MLS: 50 INJECTION, SOLUTION INTRAMUSCULAR; INTRAVENOUS at 20:41

## 2020-10-24 RX ADMIN — Medication SCH ML: at 08:26

## 2020-10-24 RX ADMIN — MEROPENEM AND SODIUM CHLORIDE SCH MLS: 1 INJECTION, SOLUTION INTRAVENOUS at 09:46

## 2020-10-24 NOTE — RAD
XR Chest 1 View Portable



History: Ventilated patient



Comparison: Radiograph prior day



Findings: Endotracheal tube tip above the lucas 3 cm. Enteric tube tip below diaphragm although out 
of field of view. Diffuse airspace opacities are similar. Large effusions. No pneumothorax.



Impression: Similar examination of the chest. Relative to 4 days prior, lung aeration has worsened.



Reported By: Mauriico Munoz 

Electronically Signed:  10/24/2020 10:26 AM

## 2020-10-24 NOTE — PRG
DATE OF SERVICE:  10/24/2020



This is 35 minutes of critical care time.



SUBJECTIVE:  This patient remains on mechanical ventilation in the supine position.

She is heavily sedated and intermittently paralyzed. 



OBJECTIVE:  VITAL SIGNS:  Temperature 98.3, pulse 66, blood pressure 104/50, O2

saturation generally in the high 90s, but she is requiring 85% oxygen to achieve

that.  Despite that, her tidal volumes are only about 230 mL, total intake has been

2059, output 1270. 

HEENT:  Unremarkable except for being intubated. 

NECK:  No JVD. 

LUNGS:  Coarse breath sounds bilaterally. 

CARDIOVASCULAR:  S1 and S2 distant. 

ABDOMEN:  Soft, obese, nontender, nondistended. 

EXTREMITIES:  Edematous.



LABORATORY DATA:  Her chest x-ray shows far worse bilateral infiltrates compared to

yesterday.  ABG, pH 7.30, pCO2 of 32, pO2 of 57 with O2 saturation 86% on bilevel

rate 28, high pressure 32, low pressure 13, FiO2 100%.  White blood cell count 26.3,

hematocrit 23.8, and platelet count 84.  Sodium 142, potassium 5.8, chloride 118,

CO2 of 16, BUN 85, creatinine 1.8, glucose 117. 



ASSESSMENT:  

1. Acute respiratory failure secondary to COVID-19 pneumonia.

2. Acute renal failure with grossly elevated potassium and increasing BUN and

creatinine. 

3. Hyperkalemia.



PLAN:  

1. I think she would be better served in a supine position for the next 24 hours to

see if we can improve her tidal volume.  I have switched her Eliquis over to Lovenox

since it is going to be difficult to give her oral medications.  For her

hyperkalemia, I have done medication review and do not see any medications that

could be contributing to that at this time.  I will give her a dose of Kayexalate

and we will repeat her potassium later this afternoon. 

2. Prognosis is very poor for functional recovery.  We will follow.







Job ID:  [id number]

## 2020-10-25 LAB
ANALYZER IN CARDIO: (no result)
ANION GAP SERPL CALC-SCNC: 14 MMOL/L (ref 10–20)
BASE EXCESS STD BLDA CALC-SCNC: -7 MEQ/L
BUN SERPL-MCNC: 89 MG/DL (ref 9.8–20.1)
CA-I BLDA-SCNC: 1.16 MMOL/L (ref 1.12–1.3)
CALCIUM SERPL-MCNC: 7.2 MG/DL (ref 7.8–10.44)
CHLORIDE SERPL-SCNC: 122 MMOL/L (ref 98–107)
CO2 SERPL-SCNC: 17 MMOL/L (ref 23–31)
CREAT CL PREDICTED SERPL C-G-VRATE: 44 ML/MIN (ref 70–130)
CRP SERPL-MCNC: 6.51 MG/DL
GLUCOSE SERPL-MCNC: 203 MG/DL (ref 80–115)
GLUCOSE SERPL-MCNC: 86 MG/DL (ref 80–115)
HCO3 BLDA-SCNC: 17.7 MEQ/L (ref 22–28)
HCT VFR BLDA CALC: 22 % (ref 36–47)
HGB BLD-MCNC: 7.8 G/DL (ref 12–16)
HGB BLDA-MCNC: 7.5 G/DL (ref 12–16)
MCH RBC QN AUTO: 30.5 PG (ref 27–31)
MCV RBC AUTO: 90.7 FL (ref 78–98)
MDIFF COMPLETE?: YES
O2 A-A PPRESDIFF RESPIRATORY: 300.77 MMHG (ref 0–20)
PCO2 BLDA: 31.7 MMHG (ref 35–45)
PH BLDA: 7.36 [PH] (ref 7.35–7.45)
PLATELET # BLD AUTO: 93 THOU/UL (ref 130–400)
PO2 BLDA: 87.4 MMHG (ref 80–?)
POTASSIUM BLD-SCNC: 4.88 MMOL/L (ref 3.7–5.3)
POTASSIUM SERPL-SCNC: 5.5 MMOL/L (ref 3.5–5.1)
RBC # BLD AUTO: 2.57 MILL/UL (ref 4.2–5.4)
SODIUM SERPL-SCNC: 147 MMOL/L (ref 136–145)
SPECIMEN DRAWN FROM PATIENT: (no result)
WBC # BLD AUTO: 18 THOU/UL (ref 4.8–10.8)

## 2020-10-25 RX ADMIN — DORZOLAMIDE HYDROCHLORIDE AND TIMOLOL MALEATE SCH EACH: 22.3; 6.8 SOLUTION/ DROPS OPHTHALMIC at 10:01

## 2020-10-25 RX ADMIN — Medication SCH UNITS: at 09:50

## 2020-10-25 RX ADMIN — INSULIN GLARGINE SCH: 100 INJECTION, SOLUTION SUBCUTANEOUS at 22:11

## 2020-10-25 RX ADMIN — Medication SCH ML: at 19:57

## 2020-10-25 RX ADMIN — POLYETHYLENE GLYCOL 400 AND PROPYLENE GLYCOL SCH EACH: 4; 3 SOLUTION/ DROPS OPHTHALMIC at 10:01

## 2020-10-25 RX ADMIN — INSULIN GLARGINE SCH MLS: 100 INJECTION, SOLUTION SUBCUTANEOUS at 09:42

## 2020-10-25 RX ADMIN — DORZOLAMIDE HYDROCHLORIDE AND TIMOLOL MALEATE SCH EACH: 22.3; 6.8 SOLUTION/ DROPS OPHTHALMIC at 19:58

## 2020-10-25 RX ADMIN — SODIUM BICARBONATE SCH MG: 325 TABLET ORAL at 13:51

## 2020-10-25 RX ADMIN — SODIUM BICARBONATE SCH MG: 325 TABLET ORAL at 19:55

## 2020-10-25 RX ADMIN — MEROPENEM AND SODIUM CHLORIDE SCH MLS: 1 INJECTION, SOLUTION INTRAVENOUS at 09:44

## 2020-10-25 RX ADMIN — SCOPALAMINE SCH MG: 1 PATCH, EXTENDED RELEASE TRANSDERMAL at 11:32

## 2020-10-25 RX ADMIN — POLYETHYLENE GLYCOL 400 AND PROPYLENE GLYCOL SCH EACH: 4; 3 SOLUTION/ DROPS OPHTHALMIC at 19:59

## 2020-10-25 RX ADMIN — DEXTROSE MONOHYDRATE PRN GM: 25 INJECTION, SOLUTION INTRAVENOUS at 11:47

## 2020-10-25 RX ADMIN — INSULIN GLARGINE SCH: 100 INJECTION, SOLUTION SUBCUTANEOUS at 11:22

## 2020-10-25 RX ADMIN — SODIUM BICARBONATE SCH MG: 325 TABLET ORAL at 09:36

## 2020-10-25 RX ADMIN — Medication SCH ML: at 09:44

## 2020-10-25 RX ADMIN — MEROPENEM AND SODIUM CHLORIDE SCH MLS: 1 INJECTION, SOLUTION INTRAVENOUS at 22:10

## 2020-10-25 RX ADMIN — Medication SCH MG: at 09:38

## 2020-10-25 RX ADMIN — Medication SCH MG: at 19:56

## 2020-10-25 RX ADMIN — BRIMONIDINE TARTRATE SCH DROP: 2 SOLUTION OPHTHALMIC at 09:59

## 2020-10-25 RX ADMIN — BRIMONIDINE TARTRATE SCH DROP: 2 SOLUTION OPHTHALMIC at 19:58

## 2020-10-25 RX ADMIN — POLYVINYL ALCOHOL, POVIDONE SCH EACH: 14; 6 SOLUTION/ DROPS OPHTHALMIC at 09:43

## 2020-10-25 RX ADMIN — FENTANYL CITRATE SCH MLS: 50 INJECTION, SOLUTION INTRAMUSCULAR; INTRAVENOUS at 14:29

## 2020-10-25 RX ADMIN — DORZOLAMIDE HYDROCHLORIDE AND TIMOLOL MALEATE SCH EACH: 22.3; 6.8 SOLUTION/ DROPS OPHTHALMIC at 13:52

## 2020-10-25 NOTE — RAD
XR Chest 1 View Portable



History: Ventilated patient



Comparison: Radiograph prior day



Findings: Slight interval improved lung aeration. No pneumothorax. No effusion. Enteric tube tip belo
w diaphragm although out of field of view. Endotracheal tube tip at the clavicular level.



Impression: Mild interval improved lung aeration.



Reported By: Mauricio Munoz 

Electronically Signed:  10/25/2020 8:24 AM

## 2020-10-25 NOTE — PRG
DATE OF SERVICE:  10/25/2020



35 minutes critical care time.



SUBJECTIVE:  The patient was placed in the prone position yesterday.  Her

oxygenation has improved significantly since that time. 



OBJECTIVE:  VITAL SIGNS:  Temperature is 97.4, pulse 64, blood pressure 156/67, O2

saturation 100%.  24-hour intake 1803, output 1500. 

HEENT:  Unremarkable. 

NECK:  No adenopathy or JVD. 

LUNGS:  Coarse breath sounds. CARDIOVASCULAR:  S1, S2.  Regular. 

ABDOMEN:  Obese, soft. 

EXTREMITIES:  No edema.



LABORATORY DATA:  ABG pending.  White blood aminata count 18, hematocrit 23.3 and

platelet count 93.  D-dimer 11.2.  Sodium 147, potassium 5.5, chloride 122, CO2 of

17, BUN 89, creatinine 1.6 and glucose 86.  C-reactive protein 6.5. 



IMAGING:  X-ray shows improved oxygenation, but continued bilateral infiltrates.



ASSESSMENT:  

1. Acute respiratory failure secondary to COVID-19 pneumonia.

2. Acute renal failure, manifested by acidosis and mild hyperkalemia.



PLAN:  

1. Moved to a supine position later today.

2. Change IV fluids.

3. One more dose of Kayexalate.

4. Continue steroids.

5. Somewhat worried about developing thrombocytopenia, so I will switch her off the

enoxaparin, start Arixtra. 





Job ID:  [id number]

## 2020-10-26 LAB
ANALYZER IN CARDIO: (no result)
ANION GAP SERPL CALC-SCNC: 14 MMOL/L (ref 10–20)
BASE EXCESS STD BLDA CALC-SCNC: -8.9 MEQ/L
BUN SERPL-MCNC: 102 MG/DL (ref 9.8–20.1)
CA-I BLDA-SCNC: 1.16 MMOL/L (ref 1.12–1.3)
CALCIUM SERPL-MCNC: 7.3 MG/DL (ref 7.8–10.44)
CHLORIDE SERPL-SCNC: 116 MMOL/L (ref 98–107)
CO2 SERPL-SCNC: 18 MMOL/L (ref 23–31)
CREAT CL PREDICTED SERPL C-G-VRATE: 41 ML/MIN (ref 70–130)
CRP SERPL-MCNC: 3.8 MG/DL
GLUCOSE SERPL-MCNC: 357 MG/DL (ref 80–115)
HCO3 BLDA-SCNC: 19.5 MEQ/L (ref 22–28)
HCT VFR BLDA CALC: 29 % (ref 36–47)
HGB BLD-MCNC: 7.8 G/DL (ref 12–16)
HGB BLDA-MCNC: 9.8 G/DL (ref 12–16)
MCH RBC QN AUTO: 30.5 PG (ref 27–31)
MCV RBC AUTO: 91.4 FL (ref 78–98)
MDIFF COMPLETE?: YES
O2 A-A PPRESDIFF RESPIRATORY: 377.57 MMHG (ref 0–20)
PCO2 BLDA: 55.3 MMHG (ref 35–45)
PH BLDA: 7.17 [PH] (ref 7.35–7.45)
PLATELET # BLD AUTO: 108 THOU/UL (ref 130–400)
PO2 BLDA: 52.4 MMHG (ref 80–?)
POTASSIUM BLD-SCNC: 5.51 MMOL/L (ref 3.7–5.3)
POTASSIUM SERPL-SCNC: 5.5 MMOL/L (ref 3.5–5.1)
RBC # BLD AUTO: 2.57 MILL/UL (ref 4.2–5.4)
SODIUM SERPL-SCNC: 142 MMOL/L (ref 136–145)
SPECIMEN DRAWN FROM PATIENT: (no result)
WBC # BLD AUTO: 17.3 THOU/UL (ref 4.8–10.8)

## 2020-10-26 RX ADMIN — BRIMONIDINE TARTRATE SCH DROP: 2 SOLUTION OPHTHALMIC at 09:06

## 2020-10-26 RX ADMIN — DORZOLAMIDE HYDROCHLORIDE AND TIMOLOL MALEATE SCH EACH: 22.3; 6.8 SOLUTION/ DROPS OPHTHALMIC at 15:15

## 2020-10-26 RX ADMIN — Medication SCH ML: at 08:56

## 2020-10-26 RX ADMIN — POLYETHYLENE GLYCOL 400 AND PROPYLENE GLYCOL SCH EACH: 4; 3 SOLUTION/ DROPS OPHTHALMIC at 09:08

## 2020-10-26 RX ADMIN — BRIMONIDINE TARTRATE SCH DROP: 2 SOLUTION OPHTHALMIC at 20:24

## 2020-10-26 RX ADMIN — INSULIN GLARGINE SCH MLS: 100 INJECTION, SOLUTION SUBCUTANEOUS at 20:26

## 2020-10-26 RX ADMIN — SODIUM BICARBONATE SCH MG: 325 TABLET ORAL at 15:15

## 2020-10-26 RX ADMIN — INSULIN LISPRO PRN UNIT: 100 INJECTION, SOLUTION INTRAVENOUS; SUBCUTANEOUS at 00:31

## 2020-10-26 RX ADMIN — INSULIN LISPRO PRN UNIT: 100 INJECTION, SOLUTION INTRAVENOUS; SUBCUTANEOUS at 20:57

## 2020-10-26 RX ADMIN — DORZOLAMIDE HYDROCHLORIDE AND TIMOLOL MALEATE SCH EACH: 22.3; 6.8 SOLUTION/ DROPS OPHTHALMIC at 09:08

## 2020-10-26 RX ADMIN — Medication SCH MG: at 08:55

## 2020-10-26 RX ADMIN — FENTANYL CITRATE SCH MLS: 50 INJECTION, SOLUTION INTRAMUSCULAR; INTRAVENOUS at 23:30

## 2020-10-26 RX ADMIN — INSULIN LISPRO PRN UNIT: 100 INJECTION, SOLUTION INTRAVENOUS; SUBCUTANEOUS at 12:30

## 2020-10-26 RX ADMIN — Medication SCH UNITS: at 09:06

## 2020-10-26 RX ADMIN — INSULIN GLARGINE SCH MLS: 100 INJECTION, SOLUTION SUBCUTANEOUS at 08:59

## 2020-10-26 RX ADMIN — DORZOLAMIDE HYDROCHLORIDE AND TIMOLOL MALEATE SCH EACH: 22.3; 6.8 SOLUTION/ DROPS OPHTHALMIC at 20:24

## 2020-10-26 RX ADMIN — MEROPENEM AND SODIUM CHLORIDE SCH MLS: 1 INJECTION, SOLUTION INTRAVENOUS at 08:59

## 2020-10-26 RX ADMIN — POLYETHYLENE GLYCOL 400 AND PROPYLENE GLYCOL SCH EACH: 4; 3 SOLUTION/ DROPS OPHTHALMIC at 20:24

## 2020-10-26 RX ADMIN — Medication SCH MG: at 20:24

## 2020-10-26 RX ADMIN — SODIUM BICARBONATE SCH MG: 325 TABLET ORAL at 08:55

## 2020-10-26 RX ADMIN — Medication SCH ML: at 23:31

## 2020-10-26 RX ADMIN — FENTANYL CITRATE SCH MLS: 50 INJECTION, SOLUTION INTRAMUSCULAR; INTRAVENOUS at 04:13

## 2020-10-26 RX ADMIN — MEROPENEM AND SODIUM CHLORIDE SCH MLS: 1 INJECTION, SOLUTION INTRAVENOUS at 20:56

## 2020-10-26 RX ADMIN — POLYVINYL ALCOHOL, POVIDONE SCH EACH: 14; 6 SOLUTION/ DROPS OPHTHALMIC at 09:08

## 2020-10-26 RX ADMIN — INSULIN LISPRO PRN UNIT: 100 INJECTION, SOLUTION INTRAVENOUS; SUBCUTANEOUS at 06:37

## 2020-10-26 RX ADMIN — SODIUM BICARBONATE SCH MG: 325 TABLET ORAL at 20:27

## 2020-10-26 RX ADMIN — INSULIN LISPRO PRN UNIT: 100 INJECTION, SOLUTION INTRAVENOUS; SUBCUTANEOUS at 17:41

## 2020-10-26 NOTE — PRG
DATE OF SERVICE:  10/26/2020



SUBJECTIVE:  Ke remains mechanically ventilated.



OBJECTIVE:  VITAL SIGNS:  Heart rate is in 80s, blood pressure is 112/61,

respiratory rate is 30. 

LUNGS:  Unchanged. 

HEART:  Unchanged. 

ABDOMEN:  Unchanged.



LABORATORY DATA:  White count 17.3, hemoglobin 7.8, and platelets __________.

Sodium 142, potassium 5.5, chloride 116, bicarb 18, , creatinine 1.78.

Glucoses have been between __________ and 320 today. 



IMPRESSION:  

1. COVID pneumonia.

2. Poorly-controlled diabetes.

3. Deconditioning. 

We will try to minimize FiO2.  Continue supportive care.  She is currently not

weanable. 







Job ID:  [id number]

## 2020-10-27 LAB
ANALYZER IN CARDIO: (no result)
ANION GAP SERPL CALC-SCNC: 14 MMOL/L (ref 10–20)
BASE EXCESS STD BLDA CALC-SCNC: -6.5 MEQ/L
BUN SERPL-MCNC: 122 MG/DL (ref 9.8–20.1)
CA-I BLDA-SCNC: 1.17 MMOL/L (ref 1.12–1.3)
CALCIUM SERPL-MCNC: 7.5 MG/DL (ref 7.8–10.44)
CHLORIDE SERPL-SCNC: 116 MMOL/L (ref 98–107)
CO2 SERPL-SCNC: 19 MMOL/L (ref 23–31)
CREAT CL PREDICTED SERPL C-G-VRATE: 44 ML/MIN (ref 70–130)
CRP SERPL-MCNC: 1.59 MG/DL
GLUCOSE SERPL-MCNC: 325 MG/DL (ref 80–115)
HCO3 BLDA-SCNC: 21.9 MEQ/L (ref 22–28)
HCT VFR BLDA CALC: 23 % (ref 36–47)
HGB BLD-MCNC: 9.2 G/DL (ref 12–16)
HGB BLDA-MCNC: 7.8 G/DL (ref 12–16)
MCH RBC QN AUTO: 30.3 PG (ref 27–31)
MCV RBC AUTO: 93.2 FL (ref 78–98)
MDIFF COMPLETE?: YES
PCO2 BLDA: 62.2 MMHG (ref 35–45)
PH BLDA: 7.17 [PH] (ref 7.35–7.45)
PLATELET # BLD AUTO: 107 THOU/UL (ref 130–400)
PO2 BLDA: 56.7 MMHG (ref 80–?)
POTASSIUM BLD-SCNC: 6.14 MMOL/L (ref 3.7–5.3)
POTASSIUM SERPL-SCNC: 6.5 MMOL/L (ref 3.5–5.1)
RBC # BLD AUTO: 3.04 MILL/UL (ref 4.2–5.4)
SODIUM SERPL-SCNC: 142 MMOL/L (ref 136–145)
SPECIMEN DRAWN FROM PATIENT: (no result)
WBC # BLD AUTO: 21.2 THOU/UL (ref 4.8–10.8)

## 2020-10-27 RX ADMIN — INSULIN LISPRO PRN UNIT: 100 INJECTION, SOLUTION INTRAVENOUS; SUBCUTANEOUS at 02:47

## 2020-10-27 RX ADMIN — INSULIN LISPRO PRN UNIT: 100 INJECTION, SOLUTION INTRAVENOUS; SUBCUTANEOUS at 23:48

## 2020-10-27 RX ADMIN — POLYETHYLENE GLYCOL 400 AND PROPYLENE GLYCOL SCH EACH: 4; 3 SOLUTION/ DROPS OPHTHALMIC at 09:23

## 2020-10-27 RX ADMIN — INSULIN LISPRO PRN UNIT: 100 INJECTION, SOLUTION INTRAVENOUS; SUBCUTANEOUS at 19:15

## 2020-10-27 RX ADMIN — Medication SCH ML: at 20:59

## 2020-10-27 RX ADMIN — FENTANYL CITRATE SCH MLS: 50 INJECTION, SOLUTION INTRAMUSCULAR; INTRAVENOUS at 20:00

## 2020-10-27 RX ADMIN — MEROPENEM AND SODIUM CHLORIDE SCH MLS: 1 INJECTION, SOLUTION INTRAVENOUS at 09:26

## 2020-10-27 RX ADMIN — Medication SCH MG: at 09:18

## 2020-10-27 RX ADMIN — SODIUM BICARBONATE SCH MG: 325 TABLET ORAL at 15:36

## 2020-10-27 RX ADMIN — INSULIN GLARGINE SCH MLS: 100 INJECTION, SOLUTION SUBCUTANEOUS at 09:24

## 2020-10-27 RX ADMIN — BACTERIOSTATIC WATER PRN ML: 1 INJECTION, SOLUTION INTRAMUSCULAR; INTRAVENOUS; SUBCUTANEOUS at 05:49

## 2020-10-27 RX ADMIN — DORZOLAMIDE HYDROCHLORIDE AND TIMOLOL MALEATE SCH EACH: 22.3; 6.8 SOLUTION/ DROPS OPHTHALMIC at 15:36

## 2020-10-27 RX ADMIN — INSULIN LISPRO PRN UNIT: 100 INJECTION, SOLUTION INTRAVENOUS; SUBCUTANEOUS at 12:04

## 2020-10-27 RX ADMIN — BRIMONIDINE TARTRATE SCH DROP: 2 SOLUTION OPHTHALMIC at 21:01

## 2020-10-27 RX ADMIN — Medication SCH UNITS: at 09:56

## 2020-10-27 RX ADMIN — Medication SCH ML: at 09:23

## 2020-10-27 RX ADMIN — SODIUM BICARBONATE SCH MG: 325 TABLET ORAL at 09:12

## 2020-10-27 RX ADMIN — POLYETHYLENE GLYCOL 400 AND PROPYLENE GLYCOL SCH EACH: 4; 3 SOLUTION/ DROPS OPHTHALMIC at 21:00

## 2020-10-27 RX ADMIN — Medication SCH MG: at 20:58

## 2020-10-27 RX ADMIN — INSULIN GLARGINE SCH MLS: 100 INJECTION, SOLUTION SUBCUTANEOUS at 21:18

## 2020-10-27 RX ADMIN — DORZOLAMIDE HYDROCHLORIDE AND TIMOLOL MALEATE SCH EACH: 22.3; 6.8 SOLUTION/ DROPS OPHTHALMIC at 21:01

## 2020-10-27 RX ADMIN — DORZOLAMIDE HYDROCHLORIDE AND TIMOLOL MALEATE SCH EACH: 22.3; 6.8 SOLUTION/ DROPS OPHTHALMIC at 09:27

## 2020-10-27 RX ADMIN — BRIMONIDINE TARTRATE SCH DROP: 2 SOLUTION OPHTHALMIC at 09:27

## 2020-10-27 RX ADMIN — POLYVINYL ALCOHOL, POVIDONE SCH EACH: 14; 6 SOLUTION/ DROPS OPHTHALMIC at 09:30

## 2020-10-27 NOTE — PRG
DATE OF SERVICE:  10/27/2020



SUBJECTIVE:  Ke Sanches remains mechanically ventilated.



OBJECTIVE:  VITAL SIGNS:  She is afebrile.  Heart rate is in 70s, respiratory rates

in the 20s, FiO2 is 55%, blood pressure 119/65. 

LUNGS:  Remarkable for coarse equal breath sounds. 

HEART:  Regular rhythm. 

ABDOMEN:  Soft.



LABORATORY DATA:  White count 21.2, hemoglobin 9.2, platelets 107.  Sodium 142,

potassium 6.5, chloride 116, bicarb 19, , creatinine 1.77, glucose 325.  PH

7.17, CO2 of 62, PO2 of 56, potassium 6.14 __________ 28.  She is on bilevel, very

small tidal volume secondary to poor lung compliance. 



IMPRESSION:  

1. Respiratory failure secondary to COVID.

2. Acute on chronic kidney disease.

3. Diabetes. 

__________ ventilator much faster, bicarb drip might allow for permissive

hypercapnia, switch her IV fluids. 



PROGNOSIS:  Dismal.







Job ID:  [id number]

## 2020-10-27 NOTE — RAD
EXAM: 

CHEST ONE VIEW



HISTORY:

On ventilator. Follow-up evaluation



COMPARISON:

10/25/2020



FINDINGS:

Endotracheal tube and nasogastric tubes remain in place. Cardiac silhouette is within normal limits f
or portable technique. . Increased interstitial and alveolar opacities are seen throughout the

lungs bilaterally with worsening airspace opacity seen at each lung base. No other interval change.



IMPRESSION:

Diffuse increased interstitial and alveolar opacities throughout the lungs bilaterally greater on the
 right interval worsening of airspace opacities at each lung base. Findings are likely due to

worsening Covid pneumonia.



Reported By: Dmitri Palma 

Electronically Signed:  10/27/2020 7:41 AM

## 2020-10-28 LAB
ANALYZER IN CARDIO: (no result)
ANION GAP SERPL CALC-SCNC: 13 MMOL/L (ref 10–20)
BASE EXCESS STD BLDA CALC-SCNC: -3.1 MEQ/L
BUN SERPL-MCNC: 122 MG/DL (ref 9.8–20.1)
CA-I BLDA-SCNC: 1.11 MMOL/L (ref 1.12–1.3)
CALCIUM SERPL-MCNC: 7.3 MG/DL (ref 7.8–10.44)
CHLORIDE SERPL-SCNC: 115 MMOL/L (ref 98–107)
CO2 SERPL-SCNC: 23 MMOL/L (ref 23–31)
CREAT CL PREDICTED SERPL C-G-VRATE: 44 ML/MIN (ref 70–130)
CRP SERPL-MCNC: 1.01 MG/DL
GLUCOSE SERPL-MCNC: 302 MG/DL (ref 80–115)
HCO3 BLDA-SCNC: 21.1 MEQ/L (ref 22–28)
HCT VFR BLDA CALC: 22 % (ref 36–47)
HGB BLD-MCNC: 8.1 G/DL (ref 12–16)
HGB BLDA-MCNC: 7.5 G/DL (ref 12–16)
MCH RBC QN AUTO: 31.2 PG (ref 27–31)
MCV RBC AUTO: 90.8 FL (ref 78–98)
MDIFF COMPLETE?: YES
O2 A-A PPRESDIFF RESPIRATORY: 306.88 MMHG (ref 0–20)
PCO2 BLDA: 33.9 MMHG (ref 35–45)
PH BLDA: 7.41 [PH] (ref 7.35–7.45)
PLATELET # BLD AUTO: 99 THOU/UL (ref 130–400)
PO2 BLDA: 42.9 MMHG (ref 80–?)
POTASSIUM BLD-SCNC: 5.58 MMOL/L (ref 3.7–5.3)
POTASSIUM SERPL-SCNC: 5.9 MMOL/L (ref 3.5–5.1)
RBC # BLD AUTO: 2.59 MILL/UL (ref 4.2–5.4)
SODIUM SERPL-SCNC: 145 MMOL/L (ref 136–145)
SPECIMEN DRAWN FROM PATIENT: (no result)
WBC # BLD AUTO: 14.6 THOU/UL (ref 4.8–10.8)

## 2020-10-28 RX ADMIN — DORZOLAMIDE HYDROCHLORIDE AND TIMOLOL MALEATE SCH EACH: 22.3; 6.8 SOLUTION/ DROPS OPHTHALMIC at 07:32

## 2020-10-28 RX ADMIN — BRIMONIDINE TARTRATE SCH DROP: 2 SOLUTION OPHTHALMIC at 21:03

## 2020-10-28 RX ADMIN — Medication SCH MG: at 07:26

## 2020-10-28 RX ADMIN — BACTERIOSTATIC WATER PRN ML: 1 INJECTION, SOLUTION INTRAMUSCULAR; INTRAVENOUS; SUBCUTANEOUS at 07:29

## 2020-10-28 RX ADMIN — FENTANYL CITRATE SCH MLS: 50 INJECTION, SOLUTION INTRAMUSCULAR; INTRAVENOUS at 12:06

## 2020-10-28 RX ADMIN — BACTERIOSTATIC WATER PRN ML: 1 INJECTION, SOLUTION INTRAMUSCULAR; INTRAVENOUS; SUBCUTANEOUS at 20:50

## 2020-10-28 RX ADMIN — POLYETHYLENE GLYCOL 400 AND PROPYLENE GLYCOL SCH EACH: 4; 3 SOLUTION/ DROPS OPHTHALMIC at 07:32

## 2020-10-28 RX ADMIN — Medication SCH ML: at 21:03

## 2020-10-28 RX ADMIN — INSULIN GLARGINE SCH MLS: 100 INJECTION, SOLUTION SUBCUTANEOUS at 09:09

## 2020-10-28 RX ADMIN — POLYETHYLENE GLYCOL 400 AND PROPYLENE GLYCOL SCH EACH: 4; 3 SOLUTION/ DROPS OPHTHALMIC at 21:03

## 2020-10-28 RX ADMIN — DORZOLAMIDE HYDROCHLORIDE AND TIMOLOL MALEATE SCH EACH: 22.3; 6.8 SOLUTION/ DROPS OPHTHALMIC at 21:02

## 2020-10-28 RX ADMIN — Medication SCH ML: at 07:33

## 2020-10-28 RX ADMIN — DORZOLAMIDE HYDROCHLORIDE AND TIMOLOL MALEATE SCH EACH: 22.3; 6.8 SOLUTION/ DROPS OPHTHALMIC at 15:52

## 2020-10-28 RX ADMIN — INSULIN LISPRO PRN UNIT: 100 INJECTION, SOLUTION INTRAVENOUS; SUBCUTANEOUS at 21:02

## 2020-10-28 RX ADMIN — INSULIN LISPRO PRN UNIT: 100 INJECTION, SOLUTION INTRAVENOUS; SUBCUTANEOUS at 15:53

## 2020-10-28 RX ADMIN — Medication SCH MG: at 20:59

## 2020-10-28 RX ADMIN — SCOPALAMINE SCH MG: 1 PATCH, EXTENDED RELEASE TRANSDERMAL at 11:10

## 2020-10-28 RX ADMIN — Medication SCH UNITS: at 07:31

## 2020-10-28 RX ADMIN — INSULIN GLARGINE SCH MLS: 100 INJECTION, SOLUTION SUBCUTANEOUS at 21:01

## 2020-10-28 RX ADMIN — INSULIN LISPRO PRN UNIT: 100 INJECTION, SOLUTION INTRAVENOUS; SUBCUTANEOUS at 05:23

## 2020-10-28 RX ADMIN — BRIMONIDINE TARTRATE SCH DROP: 2 SOLUTION OPHTHALMIC at 07:31

## 2020-10-28 RX ADMIN — POLYVINYL ALCOHOL, POVIDONE SCH EACH: 14; 6 SOLUTION/ DROPS OPHTHALMIC at 07:33

## 2020-10-28 RX ADMIN — BACTERIOSTATIC WATER PRN ML: 1 INJECTION, SOLUTION INTRAMUSCULAR; INTRAVENOUS; SUBCUTANEOUS at 23:58

## 2020-10-28 NOTE — PRG
DATE OF SERVICE:  10/28/2020



SUBJECTIVE:  Ms. Sanches __________ improvement of her acid-base disorder with her

bicarb drip. 



OBJECTIVE:  VITAL SIGNS:  Blood pressure 127/58, heart rate is 80, respiratory rates

in the teens to low 20s. 

LUNGS:  Remarkable for coarse equal breath sounds. 

HEART:  Regular rhythm. 

ABDOMEN:  Soft.



LABORATORY DATA:  White count 14.6, hemoglobin 8.1, and platelets 99.  BUN is 122,

creatinine 1.77, and potassium is 5.9.  PH 7.41, CO2 of 33, and pO2 of 42. 



IMPRESSION:  

1. COVID pneumonia with respiratory failure.

2. Acute on chronic kidney disease.



PLAN:  Continue current ventilatory support.  Nephrology has been consulted today.







Job ID:  [id number]

## 2020-10-28 NOTE — RAD
Portable frontal chest radiograph:

10/28/2020



COMPARISON: 10/27/2020



HISTORY: Ventilated patient, pneumonia



FINDINGS: Stable endotracheal tube and nasogastric tube. No discrete pneumothorax. There is extensive
 interstitial and alveolar opacity with a perihilar/bibasilar predominance, right greater than

left, not significantly changed when compared to the prior examination. Bibasilar consolidation again
 noted.



IMPRESSION: No significant interval change.



Reported By: Capo Almonte 

Electronically Signed:  10/28/2020 7:46 AM

## 2020-10-28 NOTE — CON
DATE OF CONSULTATION:  10/28/2020



SERVICE:  Renal Medicine.



HISTORY OF PRESENT ILLNESS:  Ms. Sanches is a 64-year-old  female with known

history of chronic renal failure from diabetic nephropathy, who was initially

admitted for complaints of cough and mild shortness of breath.  She was subsequently

diagnosed to have COVID-19 pneumonia.  Her hospitalization has also been marred by

an acute respiratory failure.  Currently, she is intubated and on ventilator

support.  Her renal function has been noted to be stable with this hospitalization.

Please note, her baseline creatinine is about 1.8 to 2.0 mg%.  However, the

hospitalization has been marred by hyperkalemia.  She has received p.r.n.

Kayexalate. 



REVIEW OF SYSTEMS:  Not obtainable since the patient is intubated and sedated.



MEDICATIONS:  

1. Amlodipine 5 mg p.o. b.i.d.

2. Ascorbic acid 1000 mg p.o. b.i.d.

3. Brimonidine tartrate one drop right eye twice a day.

4. Calcitriol 0.25 mcg daily.

5. Cholecalciferol 1000 units daily.

6. Precedex as directed.

7. Dorzolamide eye drop as directed.

8. Fondaparinux 5 mg subcutaneous daily.

9. Humalog sliding scale.

10. Hydralazine 50 mg p.o. t.i.d.

11. Insulin glargine 35 units subcutaneous b.i.d.

12. Isosorbide dinitrate 20 mg p.o. b.i.d.

13. Labetalol 10 mg IV p.r.n.

14. Levothyroxine 25 mcg daily.

15. Lorazepam 2 mg IV q.1 p.r.n.

16. Methylprednisolone 40 mg IV q.12.

17. Metoprolol tartrate 50 mg p.o. b.i.d.

18. Morphine 2 mg IV q.1 to q.2 p.r.n.

19. Zofran 4 mg IV q.6 p.r.n.

20. Protonix 40 mg tablet daily.

21. Propofol IV drip as directed.

22. Sodium isotonic bicarbonate 100 mL/h.



PAST MEDICAL HISTORY:  Recently status post acute respiratory failure secondary to a

COVID-19 pneumonia, history of cataract, macular degeneration, diabetic retinopathy,

chronic renal failure from diabetic nephropathy, type 2 diabetes mellitus, history

of proteinuria, status post CHF, hypothyroidism. 



PAST SURGICAL HISTORY:  Status post colonoscopy, status post multiple back

surgeries, status post bilateral tubal ligation, status post laparoscopic

cholecystectomy, status post cataract surgery. 



SOCIAL HISTORY:  The patient is single, lives with her sister.  She lives in [place].

She has 5 children.  Status post blood transfusion.  No history of smoking.  Social

drinker.  Education, 6th grade.  Retired  for [employer].  No

IV drug abuse. 



ALLERGIES:  NONE.



TRAUMA:  None.



IMMUNIZATION:  Up-to-date.



HOSPITALIZATIONS:  Please see past medical history.



FAMILY HISTORY:  Positive family history of ESRD.



PHYSICAL EXAMINATION:

VITAL SIGNS:  Blood pressure is 105/51, heart rate is 75, respiratory 28, O2

saturation 96%. 

GENERAL:  The patient is sedated, intubated on ventilator support. 

SKIN:  Adequate turgor. 

HEENT:  She has slightly pale conjunctivae.  Anicteric sclerae. 

NECK:  No neck mass.  No carotid bruits.  No JVD. 

CHEST:  No deformities. 

LUNGS:  Decreased breath sounds.  No wheezing. 

HEART:  Normal sinus rhythm.  No murmur.  No gallops.  No rubs. 

ABDOMEN:  Globular, soft, nontender.  No masses. 

EXTREMITIES:  No edema.  No deformities. 

NEUROLOGIC:  Sedated and intubated.  No tremors.  No asterixis.



LABORATORY DATA:  On October 28, 2020; white count 14.6, hemoglobin 8.1.  Sodium

145, potassium 5.9, chloride 115, carbon dioxide 23, , creatinine 1.77, GFR

29 mL/minute, glucose 302, calcium 7.3, ferritin 425. 



Further review of her serum creatinine shows the following.  October 27, 2020, 1.7;

October 26, 1.78; October 25, 2020, creatinine 1.65; October 24, 2020, creatinine

1.87; October 23, 2020, creatinine 1.58; October 20, 2020, creatinine 1.75; October 13, 2020, creatinine 2.37; October 12, 2020, creatinine 2.16; August 11, 2020,

creatinine noted at 2.0. 



Urinalysis on October 14, 2020, specific gravity is 1.014, protein is 200, rbc's 46,

wbc's 0 to 3.  Chest x-ray, diffuse bilateral infiltrates, stable. 



ASSESSMENT AND PLAN:  

1. Chronic renal failure.  This is secondary to a diabetic nephropathy.  The patient

does have proteinuria and longstanding history of diabetes mellitus.  Renal function

is relatively stable over the last several days.  I do not find any indication for

any emergency hemodialysis.  However, BUN is noted to be more than 100 and this

could be a reflection of her current steroid use.  Please note she is empirically

being hydrated with isotonic bicarbonate at 150 mL an hour.  Please note, the

patient is diuresing.  She is making at least more than a liter of urine output per

24 hours. 

2. Mild hyperkalemia.  P.r.n. Kayexalate with this patient.  Currently, on isotonic

bicarbonate, which I think will also help decrease the potassium level with this 

patient.  She is currently on isotonic bicarbonate and it is running at 100 mL/hour.

3. Acute respiratory failure secondary to COVID-19 pneumonia.  Continue supportive

care.  Still intubated. 

4. Overall, I agree with current management.  We will add Kayexalate to be given on

an every other day dosing. 





Job ID:  [id number]

## 2020-10-29 LAB
ANALYZER IN CARDIO: (no result)
ANION GAP SERPL CALC-SCNC: 12 MMOL/L (ref 10–20)
BASE EXCESS STD BLDA CALC-SCNC: 3 MEQ/L
BUN SERPL-MCNC: 140 MG/DL (ref 9.8–20.1)
CA-I BLDA-SCNC: 1.09 MMOL/L (ref 1.12–1.3)
CALCIUM SERPL-MCNC: 7.4 MG/DL (ref 7.8–10.44)
CHLORIDE SERPL-SCNC: 111 MMOL/L (ref 98–107)
CO2 SERPL-SCNC: 29 MMOL/L (ref 23–31)
CREAT CL PREDICTED SERPL C-G-VRATE: 42 ML/MIN (ref 70–130)
CRP SERPL-MCNC: 0.82 MG/DL
GLUCOSE SERPL-MCNC: 279 MG/DL (ref 80–115)
HBSAG INDEX: 0.32 S/CO (ref 0–0.99)
HBV SURFACE AB SERPL IA-ACNC: (no result) MIU/ML
HCO3 BLDA-SCNC: 29.4 MEQ/L (ref 22–28)
HCT VFR BLDA CALC: 21 % (ref 36–47)
HGB BLD-MCNC: 7.3 G/DL (ref 12–16)
HGB BLDA-MCNC: 7.2 G/DL (ref 12–16)
MCH RBC QN AUTO: 31 PG (ref 27–31)
MCV RBC AUTO: 91.6 FL (ref 78–98)
MDIFF COMPLETE?: YES
PCO2 BLDA: 56.6 MMHG (ref 35–45)
PH BLDA: 7.33 [PH] (ref 7.35–7.45)
PLATELET # BLD AUTO: 96 THOU/UL (ref 130–400)
PO2 BLDA: 56.8 MMHG (ref 80–?)
POTASSIUM BLD-SCNC: 5.86 MMOL/L (ref 3.7–5.3)
POTASSIUM SERPL-SCNC: 6.3 MMOL/L (ref 3.5–5.1)
RBC # BLD AUTO: 2.35 MILL/UL (ref 4.2–5.4)
SODIUM SERPL-SCNC: 146 MMOL/L (ref 136–145)
SPECIMEN DRAWN FROM PATIENT: (no result)
WBC # BLD AUTO: 12.2 THOU/UL (ref 4.8–10.8)

## 2020-10-29 PROCEDURE — 06HY33Z INSERTION OF INFUSION DEVICE INTO LOWER VEIN, PERCUTANEOUS APPROACH: ICD-10-PCS | Performed by: SURGERY

## 2020-10-29 RX ADMIN — BRIMONIDINE TARTRATE SCH DROP: 2 SOLUTION OPHTHALMIC at 09:14

## 2020-10-29 RX ADMIN — DORZOLAMIDE HYDROCHLORIDE AND TIMOLOL MALEATE SCH EACH: 22.3; 6.8 SOLUTION/ DROPS OPHTHALMIC at 16:42

## 2020-10-29 RX ADMIN — FENTANYL CITRATE SCH MLS: 50 INJECTION, SOLUTION INTRAMUSCULAR; INTRAVENOUS at 21:48

## 2020-10-29 RX ADMIN — INSULIN LISPRO PRN UNIT: 100 INJECTION, SOLUTION INTRAVENOUS; SUBCUTANEOUS at 21:37

## 2020-10-29 RX ADMIN — INSULIN GLARGINE SCH MLS: 100 INJECTION, SOLUTION SUBCUTANEOUS at 09:38

## 2020-10-29 RX ADMIN — BACTERIOSTATIC WATER PRN ML: 1 INJECTION, SOLUTION INTRAMUSCULAR; INTRAVENOUS; SUBCUTANEOUS at 21:34

## 2020-10-29 RX ADMIN — DORZOLAMIDE HYDROCHLORIDE AND TIMOLOL MALEATE SCH EACH: 22.3; 6.8 SOLUTION/ DROPS OPHTHALMIC at 09:14

## 2020-10-29 RX ADMIN — DORZOLAMIDE HYDROCHLORIDE AND TIMOLOL MALEATE SCH EACH: 22.3; 6.8 SOLUTION/ DROPS OPHTHALMIC at 21:36

## 2020-10-29 RX ADMIN — INSULIN GLARGINE SCH MLS: 100 INJECTION, SOLUTION SUBCUTANEOUS at 21:34

## 2020-10-29 RX ADMIN — POLYVINYL ALCOHOL, POVIDONE SCH EACH: 14; 6 SOLUTION/ DROPS OPHTHALMIC at 09:14

## 2020-10-29 RX ADMIN — Medication SCH UNITS: at 09:38

## 2020-10-29 RX ADMIN — POLYETHYLENE GLYCOL 400 AND PROPYLENE GLYCOL SCH EACH: 4; 3 SOLUTION/ DROPS OPHTHALMIC at 21:37

## 2020-10-29 RX ADMIN — Medication SCH MG: at 09:39

## 2020-10-29 RX ADMIN — FENTANYL CITRATE SCH MLS: 50 INJECTION, SOLUTION INTRAMUSCULAR; INTRAVENOUS at 04:54

## 2020-10-29 RX ADMIN — BRIMONIDINE TARTRATE SCH DROP: 2 SOLUTION OPHTHALMIC at 21:37

## 2020-10-29 RX ADMIN — Medication SCH MG: at 21:35

## 2020-10-29 RX ADMIN — BACTERIOSTATIC WATER PRN ML: 1 INJECTION, SOLUTION INTRAMUSCULAR; INTRAVENOUS; SUBCUTANEOUS at 04:00

## 2020-10-29 RX ADMIN — Medication SCH ML: at 09:48

## 2020-10-29 RX ADMIN — BACTERIOSTATIC WATER PRN ML: 1 INJECTION, SOLUTION INTRAMUSCULAR; INTRAVENOUS; SUBCUTANEOUS at 23:51

## 2020-10-29 RX ADMIN — POLYETHYLENE GLYCOL 400 AND PROPYLENE GLYCOL SCH EACH: 4; 3 SOLUTION/ DROPS OPHTHALMIC at 09:14

## 2020-10-29 RX ADMIN — INSULIN LISPRO PRN UNIT: 100 INJECTION, SOLUTION INTRAVENOUS; SUBCUTANEOUS at 09:52

## 2020-10-29 RX ADMIN — INSULIN LISPRO PRN UNIT: 100 INJECTION, SOLUTION INTRAVENOUS; SUBCUTANEOUS at 06:32

## 2020-10-29 NOTE — PRG
DATE OF SERVICE:  10/29/2020



SUBJECTIVE:  Ms. Sanches is a 64-year-old  female, who was admitted for

COVID-19 pneumonia/acute respiratory failure.  We are seeing this patient for her

chronic renal failure from diabetic nephropathy.  In the last several days, her

potassium has been going up.  She has been receiving p.r.n. Kayexalate.  Yesterday,

she was given Kayexalate with lactulose, but she did not have any bowel movement.

Her potassium has persistently elevated.  Due to the progressive azotemia-BUN is now

140 and hyperkalemia, we have decided to initiate hemodialysis with this patient.

Please note that daughter, Mindy has given consent to proceed with the said

dialysis. 



OBJECTIVE:  VITAL SIGNS:  Blood pressure is 117/47, heart rate 73, respiratory rate

26, and O2 saturation 99%. 

GENERAL:  The patient is sedated and intubated on ventilator support. 

SKIN:  Adequate turgor. 

HEENT:  She has a slightly pale conjunctivae.  Anicteric sclerae. 

NECK:  No neck mass.  No carotid bruits.  No JVD. 

CHEST:  No deformities. 

LUNGS:  Decreased breath sounds. 

HEART:  Normal sinus rhythm.  No murmur.  No gallops.  No rubs. 

ABDOMEN:  Globular, soft, nontender.  No masses. 

EXTREMITIES:  No edema.  No deformities.



MEDICATIONS:  Medications of October 29, 2020, were reviewed.



LABORATORY DATA:  Laboratories of October 29, 2020; white count 12.2, hemoglobin

7.3.  Sodium 146, potassium 6.3, chloride 111, carbon dioxide 29, ,

creatinine 1.87, calcium 7.4.  C-reactive protein 0.82. 



Chest x-ray of October 29, 2020, showed stable multilobar pneumonia.



ASSESSMENT AND PLAN:  

1. Hyperkalemia/progressive azotemia-we will initiate hemodialysis.  Our plan is to

do a 1-hour hemodialysis, then consecutively do daily dialysis with incremental

increase in time until we reach 4 hours.  Surgical consult has been done for

placement of femoral dialysis catheter. 

2. COVID-19 pneumonia/acute respiratory failure.  Continue supportive care.

Currently, on ventilator support.  Overall, prognosis remains guarded.  Case

discussed with the patient's daughter, Mindy. 







Job ID:  [id number]

## 2020-10-29 NOTE — RAD
Exam: Chest one view



HISTORY:Respiratory distress. Ventilated patient.



Comparison: 10/28/2020



FINDINGS:

Lines and tubes: Endotracheal tube extends beyond the clavicles. Nasogastric tube is beyond the diaph
ragm.

Cardiac silhouette: Normal

Aorta: Unremarkable

Pulmonary vessels: Normal

Costophrenic angles: Clear



LUNGS: Redemonstration of multifocal interstitial and alveolar opacities. The degree of lung parenchy
mal opacification is on changed.

Pneumothorax: None



Osseous abnormalities: None



IMPRESSION: Stable multi lobar pneumonia.



Reported By: Ben Washington 

Electronically Signed:  10/29/2020 7:50 AM

## 2020-10-29 NOTE — OP
DATE OF PROCEDURE:  10/29/2020



PREOPERATIVE DIAGNOSIS:  Acute renal failure.



POSTOPERATIVE DIAGNOSIS:  Acute renal failure.



PROCEDURE PERFORMED:  Right femoral dialysis catheter placement.



ANESTHESIA:  Local.



ESTIMATED BLOOD LOSS:  Minimal.



COMPLICATIONS:  None.



DESCRIPTION OF PROCEDURE:  The right groin was shaved, prepped, and draped in a

sterile fashion.  Local anesthetic infiltrated over the right femoral vein.  Femoral

vein was cannulated using a Seldinger needle and a wire was passed under no tension.

 A nick was made at the wire entrance site.  The wire was used as a guide to dilate

the femoral vein.  The Trialysis catheter was threaded to its full extent.  The wire

was removed.  All ports flushed and drawn blood without difficulty, just flushed

with a saline followed by heparin flush.  The catheter was sutured to the skin of

the groin and then closed with nylon.  Sterile dressing was placed.  The patient

tolerated the procedure well. 







Job ID:  [id number]

## 2020-10-29 NOTE — RAD
SINGLE VIEW CHEST:

 

Date:  10/27/2020

 

COMPARISON:  

10/26/2020. 10/29/2020. 

 

HISTORY:  

Ventilated patient with respiratory failure. 

 

This exam was not made available for interpretation until 10/29/2020 at 0929 hours. 

 

FINDINGS:

Single view of the chest shows an enlarged but stable cardiomediastinal silhouette. The endotracheal 
tube and NG tube are unchanged in position. There are diffuse multifocal infiltrates in the lungs whi
ch have not changed significantly. Degenerative changes are seen in the spine. 

 

IMPRESSION: 

Multifocal infiltrates. 

 

 

 

 

POS: AH

## 2020-10-29 NOTE — PRG
DATE OF SERVICE:  10/29/2020



SUBJECTIVE:  Ke Sanches was dialyzed this morning, 300 mL.



OBJECTIVE:  VITAL SIGNS:  Heart rate 74, blood pressure 107/60, respiratory rate is

in the 20s. 

LUNGS:  Remarkable for coarse equal breath sounds. 

HEART:  Regular rhythm. 

ABDOMEN:  Soft.



LABORATORY DATA:  White count 12.2, hemoglobin 7.3, and platelets 96,000.  Sodium

146, potassium 6.3, chloride 111, bicarb 29, , creatinine 1.87.  PH 7.33, CO2

56, PO2 56. 



IMPRESSION:  

1. COVID pneumonia, on bilevel ventilation.

2. Acute renal failure on top of chronic kidney disease.

3. Diabetes.



PLAN:  Continue supportive care.  She is not weanable.







Job ID:  [id number]

## 2020-10-30 LAB
ANALYZER IN CARDIO: (no result)
ANALYZER IN CARDIO: (no result)
ANION GAP SERPL CALC-SCNC: 11 MMOL/L (ref 10–20)
BASE EXCESS STD BLDA CALC-SCNC: 10.2 MEQ/L
BASE EXCESS STD BLDA CALC-SCNC: 13.9 MEQ/L
BUN SERPL-MCNC: 122 MG/DL (ref 9.8–20.1)
CA-I BLDA-SCNC: 0.99 MMOL/L (ref 1.12–1.3)
CA-I BLDA-SCNC: 1.01 MMOL/L (ref 1.12–1.3)
CALCIUM SERPL-MCNC: 7 MG/DL (ref 7.8–10.44)
CHLORIDE SERPL-SCNC: 100 MMOL/L (ref 98–107)
CO2 SERPL-SCNC: 36 MMOL/L (ref 23–31)
CREAT CL PREDICTED SERPL C-G-VRATE: 52 ML/MIN (ref 70–130)
CRP SERPL-MCNC: 0.52 MG/DL
GLUCOSE SERPL-MCNC: 257 MG/DL (ref 80–115)
HCO3 BLDA-SCNC: 37.2 MEQ/L (ref 22–28)
HCO3 BLDA-SCNC: 40.7 MEQ/L (ref 22–28)
HCT VFR BLDA CALC: 25 % (ref 36–47)
HCT VFR BLDA CALC: 28 % (ref 36–47)
HGB BLD-MCNC: 6.6 G/DL (ref 12–16)
HGB BLDA-MCNC: 8.4 G/DL (ref 12–16)
HGB BLDA-MCNC: 9.4 G/DL (ref 12–16)
MCH RBC QN AUTO: 30.6 PG (ref 27–31)
MCV RBC AUTO: 91.2 FL (ref 78–98)
MDIFF COMPLETE?: YES
PCO2 BLDA: 66.8 MMHG (ref 35–45)
PCO2 BLDA: 67.8 MMHG (ref 35–45)
PH BLDA: 7.36 [PH] (ref 7.35–7.45)
PH BLDA: 7.4 [PH] (ref 7.35–7.45)
PLATELET # BLD AUTO: 87 THOU/UL (ref 130–400)
PO2 BLDA: 43.5 MMHG (ref 80–?)
PO2 BLDA: 53.3 MMHG (ref 80–?)
POTASSIUM BLD-SCNC: 4.92 MMOL/L (ref 3.7–5.3)
POTASSIUM BLD-SCNC: 4.99 MMOL/L (ref 3.7–5.3)
POTASSIUM SERPL-SCNC: 5.1 MMOL/L (ref 3.5–5.1)
RBC # BLD AUTO: 2.17 MILL/UL (ref 4.2–5.4)
SODIUM SERPL-SCNC: 142 MMOL/L (ref 136–145)
SPECIMEN DRAWN FROM PATIENT: (no result)
SPECIMEN DRAWN FROM PATIENT: (no result)
WBC # BLD AUTO: 9.1 THOU/UL (ref 4.8–10.8)

## 2020-10-30 PROCEDURE — 5A1D70Z PERFORMANCE OF URINARY FILTRATION, INTERMITTENT, LESS THAN 6 HOURS PER DAY: ICD-10-PCS | Performed by: INTERNAL MEDICINE

## 2020-10-30 PROCEDURE — 30233N1 TRANSFUSION OF NONAUTOLOGOUS RED BLOOD CELLS INTO PERIPHERAL VEIN, PERCUTANEOUS APPROACH: ICD-10-PCS | Performed by: INTERNAL MEDICINE

## 2020-10-30 PROCEDURE — 3E033XZ INTRODUCTION OF VASOPRESSOR INTO PERIPHERAL VEIN, PERCUTANEOUS APPROACH: ICD-10-PCS | Performed by: INTERNAL MEDICINE

## 2020-10-30 RX ADMIN — DORZOLAMIDE HYDROCHLORIDE AND TIMOLOL MALEATE SCH EACH: 22.3; 6.8 SOLUTION/ DROPS OPHTHALMIC at 17:18

## 2020-10-30 RX ADMIN — POLYETHYLENE GLYCOL 400 AND PROPYLENE GLYCOL SCH EACH: 4; 3 SOLUTION/ DROPS OPHTHALMIC at 20:50

## 2020-10-30 RX ADMIN — Medication SCH MG: at 11:00

## 2020-10-30 RX ADMIN — Medication SCH UNITS: at 11:00

## 2020-10-30 RX ADMIN — Medication SCH ML: at 01:16

## 2020-10-30 RX ADMIN — Medication SCH ML: at 11:00

## 2020-10-30 RX ADMIN — POLYETHYLENE GLYCOL 400 AND PROPYLENE GLYCOL SCH EACH: 4; 3 SOLUTION/ DROPS OPHTHALMIC at 11:00

## 2020-10-30 RX ADMIN — INSULIN LISPRO PRN UNIT: 100 INJECTION, SOLUTION INTRAVENOUS; SUBCUTANEOUS at 06:23

## 2020-10-30 RX ADMIN — Medication SCH MG: at 20:48

## 2020-10-30 RX ADMIN — INSULIN GLARGINE SCH MLS: 100 INJECTION, SOLUTION SUBCUTANEOUS at 20:48

## 2020-10-30 RX ADMIN — POLYVINYL ALCOHOL, POVIDONE SCH EACH: 14; 6 SOLUTION/ DROPS OPHTHALMIC at 11:00

## 2020-10-30 RX ADMIN — FENTANYL CITRATE SCH MLS: 50 INJECTION, SOLUTION INTRAMUSCULAR; INTRAVENOUS at 14:22

## 2020-10-30 RX ADMIN — DORZOLAMIDE HYDROCHLORIDE AND TIMOLOL MALEATE SCH EACH: 22.3; 6.8 SOLUTION/ DROPS OPHTHALMIC at 11:00

## 2020-10-30 RX ADMIN — DORZOLAMIDE HYDROCHLORIDE AND TIMOLOL MALEATE SCH EACH: 22.3; 6.8 SOLUTION/ DROPS OPHTHALMIC at 20:49

## 2020-10-30 RX ADMIN — BRIMONIDINE TARTRATE SCH DROP: 2 SOLUTION OPHTHALMIC at 11:00

## 2020-10-30 RX ADMIN — BRIMONIDINE TARTRATE SCH DROP: 2 SOLUTION OPHTHALMIC at 20:50

## 2020-10-30 RX ADMIN — INSULIN GLARGINE SCH MLS: 100 INJECTION, SOLUTION SUBCUTANEOUS at 11:00

## 2020-10-30 NOTE — PRG
DATE OF SERVICE:  10/30/2020



SUBJECTIVE:  Ms. Sanches is being dialyzed once she remains mechanically ventilated.



OBJECTIVE:  VITAL SIGNS:  She is afebrile, heart rate is in the 60s, and blood

pressure 109/54. 

LUNGS:  Distant clear. 

HEART:  Regular rhythm. 

ABDOMEN:  Soft. 

EXTREMITIES:  Without edema.



LABORATORY DATA:  White count 9.1, hemoglobin 6.6, platelets 87,000. 



Sodium __________, potassium 5.1, chloride 100, bicarb 36, , creatinine 1.59.

 A pH 7.40, CO2 of 66, and pO2 of 53. 



IMPRESSION:  

1. Respiratory failure associated with COVID pneumonia.

2. Obesity and deconditioning.

3. Acute renal failure on top of chronic kidney disease.

4. Anemia, it is likely secondary to __________ disease.  She will need to be

transfused today and continue dialysis.  She is still not weanable from mechanical

ventilation. 







Job ID:  [id number]

## 2020-10-30 NOTE — PRG
DATE OF SERVICE:  10/30/2020



SUBJECTIVE:  Ms. Sanches is a 64-year-old  female with chronic renal failure

from her diabetic nephropathy and was admitted for COVID-19 pneumonia.  She has had

gone to acute respiratory failure, still currently intubated.  Renal function has

worsened, where she became very catabolic with a BUN more than 100.  In addition,

she was persistently hyperkalemic.  We have initiated her on dialysis.  She is on

her second day of dialysis.  We are maxing out fluid removal at about 2.7 to 3 L as

tolerated.  She was also found to be significantly anemic.  For that reason, we will

plan to give 2 units of packed RBC with this patient.  She is currently on pressor

support to stabilize her blood pressure with dialysis. 



OBJECTIVE:  VITAL SIGNS:  Blood pressure 121/70, heart rate 64, respiratory rate 26,

and O2 saturation 96%. 

GENERAL:  The patient is sedated, intubated on ventilator support. 

SKIN:  Adequate turgor. 

HEENT:  Pale conjunctivae, anicteric sclerae.  No neck mass.  No carotid bruits.  No

JVD. 

CHEST:  No deformities. 

LUNGS:  Decreased breath sounds. 

HEART:  Normal sinus rhythm.  No murmurs, gallops, or rubs. 

ABDOMEN:  Globular, soft, and nontender.  No masses. 

EXTREMITIES:  Trace edema.



MEDICATIONS:  October 30, 2020, was reviewed.



LABORATORY DATA:  October 30, 2020; white count 9.1, hemoglobin 6.6.  Sodium 142,

potassium 5.1, chloride 100, carbon dioxide 36, , creatinine 1.59, glucose

257, and calcium 7.  C-reactive protein 0.52. 



ASSESSMENT AND PLAN:  

1. Metabolic acidosis, much improved.  Bicarbonate is now 36.  Consider

discontinuing her isotonic bicarbonate if she is still on that medication.  She is

now on dialysis. 

2. Volume overload, currently on hemodialysis, maxing out fluid removal.

3. Anemia.  We will give 2 units of packed RBC with dialysis.

4. Acute respiratory failure secondary to COVID-19 pneumonia.  Continuing supportive

care. 

5. Overall prognosis remains guarded. 

Case previously discussed with the patient's daughter, Mindy.







Job ID:  [id number]

## 2020-10-30 NOTE — RAD
XR Chest 1 View Portable



History: Ventilated patient



Comparison: Radiograph prior day



Findings: Endotracheal tube tip above the lucas 3.5 cm. Diffuse airspace consolidation. No pneumotho
rax. Small effusions. No acute osseous abnormality.



Enteric tube tip not well seen due to patient rotation and decreased penetration.



Impression: Similar examination the chest given differences in patient rotation.



Reported By: Mauricio Munoz 

Electronically Signed:  10/30/2020 7:47 AM

## 2020-10-31 LAB
ANALYZER IN CARDIO: (no result)
ANION GAP SERPL CALC-SCNC: 12 MMOL/L (ref 10–20)
BASE EXCESS STD BLDA CALC-SCNC: 2 MEQ/L
BUN SERPL-MCNC: 96 MG/DL (ref 9.8–20.1)
CA-I BLDA-SCNC: 1.08 MMOL/L (ref 1.12–1.3)
CALCIUM SERPL-MCNC: 7 MG/DL (ref 7.8–10.44)
CHLORIDE SERPL-SCNC: 96 MMOL/L (ref 98–107)
CO2 SERPL-SCNC: 33 MMOL/L (ref 23–31)
CREAT CL PREDICTED SERPL C-G-VRATE: 58 ML/MIN (ref 70–130)
CRP SERPL-MCNC: (no result) MG/DL
GLUCOSE SERPL-MCNC: 191 MG/DL (ref 80–115)
HCO3 BLDA-SCNC: 28.3 MEQ/L (ref 22–28)
HCT VFR BLDA CALC: 39 % (ref 36–47)
HGB BLD-MCNC: 11.4 G/DL (ref 12–16)
HGB BLDA-MCNC: 13.2 G/DL (ref 12–16)
MCH RBC QN AUTO: 30.7 PG (ref 27–31)
MCV RBC AUTO: 88.5 FL (ref 78–98)
MDIFF COMPLETE?: YES
O2 A-A PPRESDIFF RESPIRATORY: 338.07 MMHG (ref 0–20)
PCO2 BLDA: 50.7 MMHG (ref 35–45)
PH BLDA: 7.36 [PH] (ref 7.35–7.45)
PLATELET # BLD AUTO: 97 THOU/UL (ref 130–400)
PO2 BLDA: 62 MMHG (ref 80–?)
POTASSIUM BLD-SCNC: 4.35 MMOL/L (ref 3.7–5.3)
POTASSIUM SERPL-SCNC: 4.5 MMOL/L (ref 3.5–5.1)
RBC # BLD AUTO: 3.73 MILL/UL (ref 4.2–5.4)
SODIUM SERPL-SCNC: 136 MMOL/L (ref 136–145)
SPECIMEN DRAWN FROM PATIENT: (no result)
WBC # BLD AUTO: 15.7 THOU/UL (ref 4.8–10.8)

## 2020-10-31 RX ADMIN — DORZOLAMIDE HYDROCHLORIDE AND TIMOLOL MALEATE SCH EACH: 22.3; 6.8 SOLUTION/ DROPS OPHTHALMIC at 20:24

## 2020-10-31 RX ADMIN — DORZOLAMIDE HYDROCHLORIDE AND TIMOLOL MALEATE SCH EACH: 22.3; 6.8 SOLUTION/ DROPS OPHTHALMIC at 09:46

## 2020-10-31 RX ADMIN — BRIMONIDINE TARTRATE SCH DROP: 2 SOLUTION OPHTHALMIC at 09:26

## 2020-10-31 RX ADMIN — Medication SCH UNITS: at 11:13

## 2020-10-31 RX ADMIN — Medication SCH ML: at 20:26

## 2020-10-31 RX ADMIN — Medication SCH: at 07:28

## 2020-10-31 RX ADMIN — POLYVINYL ALCOHOL, POVIDONE SCH EACH: 14; 6 SOLUTION/ DROPS OPHTHALMIC at 09:29

## 2020-10-31 RX ADMIN — Medication SCH MG: at 20:26

## 2020-10-31 RX ADMIN — INSULIN GLARGINE SCH MLS: 100 INJECTION, SOLUTION SUBCUTANEOUS at 09:25

## 2020-10-31 RX ADMIN — INSULIN GLARGINE SCH: 100 INJECTION, SOLUTION SUBCUTANEOUS at 20:30

## 2020-10-31 RX ADMIN — Medication SCH MG: at 09:23

## 2020-10-31 RX ADMIN — POLYETHYLENE GLYCOL 400 AND PROPYLENE GLYCOL SCH EACH: 4; 3 SOLUTION/ DROPS OPHTHALMIC at 09:32

## 2020-10-31 RX ADMIN — FENTANYL CITRATE SCH MLS: 50 INJECTION, SOLUTION INTRAMUSCULAR; INTRAVENOUS at 05:20

## 2020-10-31 RX ADMIN — FENTANYL CITRATE SCH MLS: 50 INJECTION, SOLUTION INTRAMUSCULAR; INTRAVENOUS at 21:47

## 2020-10-31 RX ADMIN — POLYETHYLENE GLYCOL 400 AND PROPYLENE GLYCOL SCH EACH: 4; 3 SOLUTION/ DROPS OPHTHALMIC at 20:24

## 2020-10-31 RX ADMIN — DEXTROSE MONOHYDRATE PRN GM: 25 INJECTION, SOLUTION INTRAVENOUS at 16:35

## 2020-10-31 RX ADMIN — Medication SCH ML: at 09:25

## 2020-10-31 RX ADMIN — DORZOLAMIDE HYDROCHLORIDE AND TIMOLOL MALEATE SCH EACH: 22.3; 6.8 SOLUTION/ DROPS OPHTHALMIC at 15:27

## 2020-10-31 RX ADMIN — SCOPALAMINE SCH MG: 1 PATCH, EXTENDED RELEASE TRANSDERMAL at 11:16

## 2020-10-31 RX ADMIN — BRIMONIDINE TARTRATE SCH DROP: 2 SOLUTION OPHTHALMIC at 20:24

## 2020-10-31 NOTE — PRG
DATE OF SERVICE:  10/31/2020



SUBJECTIVE:  Ms. Sanches is currently receiving additional dialysis today.  She

tolerated yesterday with a net removal of 3 L with addition of transfusion of 2

units.  She remains on ventilatory support. 



OBJECTIVE:  VITAL SIGNS:  Blood pressure ranges from 104/49 to 142/53, heart rate is

62, pulse ox is 100%. 

GENERAL:  She is orally intubated and current ventilator settings include rate of

26, bilevel 33/13, and FiO2 of 65%. 

LUNGS:  Show rhonchi, but no wheezes or rales. 

HEART:  Regular rate and rhythm. 

ABDOMEN:  Moderately obese without guarding.  Bowel sounds are normal. 

EXTREMITIES:  Show no cyanosis or clubbing.  She has 1+ edema.



LABORATORY DATA:  White count 9100, hemoglobin is 11.4, hematocrit 33.  She has 89

segs and 3 bands.  Blood gas this morning includes pH 7.36, pCO2 of 50, pO2 of 62,

and bicarbonate 28.  Electrolytes include sodium 136, potassium 4.5, chloride 96,

CO2 is 33, BUN 96, creatinine 1.4. 



Chest x-ray has been reviewed.



IMPRESSION:  

1. Respiratory failure secondary to COVID pneumonia.  She is still receiving

significant ventilatory support and not much in the way of weaning. 

2. Acute on chronic renal failure requiring dialysis.

3. Anemia, transfused yesterday.



PLAN:  We will continue current ventilator support.  Hopefully, once she has

adequate volume control with dialysis, we can begin making further slow weaning.

Condition remains very guarded. 



Critical care time, 30 minutes.







Job ID:  [id number]

## 2020-10-31 NOTE — PRG
DATE OF SERVICE:  10/31/2020



SUBJECTIVE:  Ms. Sanches is a 64-year-old  female who was admitted for

COVID-19 pneumonia.  We are following this patient for her chronic renal

failure/acute kidney injury. 



She has been very catabolic and due to progressive azotemia and volume overload, she

was initiated dialysis.  She is currently undergoing hemodialysis today.  Fluid

removal is being done. 



No acute events noted.



OBJECTIVE:  VITAL SIGNS:  Blood pressure is 105/48, heart rate 68, respiratory rate

is 12, and O2 saturation 100%. 

GENERAL:  The patient was sedated, intubated on ventilator support. 

SKIN:  Adequate turgor. 

HEENT:  She has pinkish conjunctivae.  Anicteric sclerae. 

NECK:  No neck mass.  No carotid bruits.  No JVD. 

CHEST:  No deformities. 

LUNGS:  Decreased breath sounds. 

HEART:  Normal sinus rhythm.  No murmurs, gallops, or rubs. 

ABDOMEN:  Globular, soft, nontender.  No masses. 

EXTREMITIES:  No edema.



MEDICATIONS:  Of October 31, 2020, were reviewed.



LABORATORY DATA:  Laboratories of October 31, 2020, show white count of 15.7,

hemoglobin 11.4.  Sodium 136, potassium 4.4, chloride 96, carbon dioxide 33, BUN 96,

creatinine 1.42, glucose 191, calcium 7.  Ferritin is 524.  C-reactive protein is

less than 0.5. 



ASSESSMENT AND PLAN:  

1. Acute kidney injury/chronic renal failure - continue supportive hemodialysis.  I

have scheduled her for 3-hour hemodialysis treatment with fluid removal only as

tolerated.  Albumin infusion will be given for BP support during the dialysis. 

2. COVID-19 pneumonia.  Continue supportive care.  The patient is still on vent

support.  We are maxing out fluid removal with the dialysis. 

Overall, prognosis remains guarded.







Job ID:  [id number]

## 2020-11-01 LAB
ANALYZER IN CARDIO: (no result)
ANION GAP SERPL CALC-SCNC: 12 MMOL/L (ref 10–20)
BASE EXCESS STD BLDA CALC-SCNC: 3.7 MEQ/L
BUN SERPL-MCNC: 64 MG/DL (ref 9.8–20.1)
CA-I BLDA-SCNC: 1.07 MMOL/L (ref 1.12–1.3)
CALCIUM SERPL-MCNC: 7 MG/DL (ref 7.8–10.44)
CHLORIDE SERPL-SCNC: 98 MMOL/L (ref 98–107)
CO2 SERPL-SCNC: 31 MMOL/L (ref 23–31)
CREAT CL PREDICTED SERPL C-G-VRATE: 66 ML/MIN (ref 70–130)
CRP SERPL-MCNC: (no result) MG/DL
GLUCOSE SERPL-MCNC: 110 MG/DL (ref 80–115)
GLUCOSE SERPL-MCNC: 81 MG/DL (ref 80–115)
HCO3 BLDA-SCNC: 29.5 MEQ/L (ref 22–28)
HCT VFR BLDA CALC: 30 % (ref 36–47)
HGB BLD-MCNC: 9.6 G/DL (ref 12–16)
HGB BLDA-MCNC: 10.1 G/DL (ref 12–16)
MCH RBC QN AUTO: 30.5 PG (ref 27–31)
MCV RBC AUTO: 89 FL (ref 78–98)
MDIFF COMPLETE?: YES
PCO2 BLDA: 50.4 MMHG (ref 35–45)
PH BLDA: 7.39 [PH] (ref 7.35–7.45)
PLATELET # BLD AUTO: 74 THOU/UL (ref 130–400)
PO2 BLDA: 61.9 MMHG (ref 80–?)
POTASSIUM BLD-SCNC: 3.93 MMOL/L (ref 3.7–5.3)
POTASSIUM SERPL-SCNC: 4 MMOL/L (ref 3.5–5.1)
RBC # BLD AUTO: 3.14 MILL/UL (ref 4.2–5.4)
SODIUM SERPL-SCNC: 137 MMOL/L (ref 136–145)
SPECIMEN DRAWN FROM PATIENT: (no result)
WBC # BLD AUTO: 10.8 THOU/UL (ref 4.8–10.8)

## 2020-11-01 RX ADMIN — Medication SCH ML: at 08:16

## 2020-11-01 RX ADMIN — DORZOLAMIDE HYDROCHLORIDE AND TIMOLOL MALEATE SCH EACH: 22.3; 6.8 SOLUTION/ DROPS OPHTHALMIC at 14:39

## 2020-11-01 RX ADMIN — Medication SCH ML: at 20:57

## 2020-11-01 RX ADMIN — BRIMONIDINE TARTRATE SCH DROP: 2 SOLUTION OPHTHALMIC at 08:48

## 2020-11-01 RX ADMIN — FENTANYL CITRATE SCH MLS: 50 INJECTION, SOLUTION INTRAMUSCULAR; INTRAVENOUS at 16:56

## 2020-11-01 RX ADMIN — POLYVINYL ALCOHOL, POVIDONE SCH EACH: 14; 6 SOLUTION/ DROPS OPHTHALMIC at 08:30

## 2020-11-01 RX ADMIN — BRIMONIDINE TARTRATE SCH DROP: 2 SOLUTION OPHTHALMIC at 20:56

## 2020-11-01 RX ADMIN — INSULIN GLARGINE SCH: 100 INJECTION, SOLUTION SUBCUTANEOUS at 08:15

## 2020-11-01 RX ADMIN — Medication SCH MG: at 08:08

## 2020-11-01 RX ADMIN — DORZOLAMIDE HYDROCHLORIDE AND TIMOLOL MALEATE SCH EACH: 22.3; 6.8 SOLUTION/ DROPS OPHTHALMIC at 20:56

## 2020-11-01 RX ADMIN — Medication SCH MG: at 20:54

## 2020-11-01 RX ADMIN — DEXTROSE MONOHYDRATE PRN GM: 25 INJECTION, SOLUTION INTRAVENOUS at 17:30

## 2020-11-01 RX ADMIN — Medication SCH UNITS: at 10:25

## 2020-11-01 RX ADMIN — DORZOLAMIDE HYDROCHLORIDE AND TIMOLOL MALEATE SCH EACH: 22.3; 6.8 SOLUTION/ DROPS OPHTHALMIC at 08:16

## 2020-11-01 RX ADMIN — POLYETHYLENE GLYCOL 400 AND PROPYLENE GLYCOL SCH EACH: 4; 3 SOLUTION/ DROPS OPHTHALMIC at 08:24

## 2020-11-01 RX ADMIN — POLYETHYLENE GLYCOL 400 AND PROPYLENE GLYCOL SCH EACH: 4; 3 SOLUTION/ DROPS OPHTHALMIC at 20:56

## 2020-11-01 NOTE — PRG
DATE OF SERVICE:  11/01/2020



SUBJECTIVE:  Ms. Sanches tolerated dialysis yesterday, although did require Levophed

to maintain blood pressure and to allow adequate fluid removal.  This morning, she

was quite hypertensive, but has not yet received her morning antihypertensive

therapy.  She is currently having sedation held to re-evaluate neurologic status,

but it is too early to see that level of response. 



PHYSICAL EXAMINATION:

VITAL SIGNS:  Blood pressure 145/52, heart rate is 52, saturation 98%, respiratory

rate is 26.  Current ventilator settings include a rate of 26, bilevel support 33/13

with FiO2 of 50%.  The most recent blood pressure on the monitor is 220 systolic.

She is not currently responsive, although sedation was only recently held for

reassessment. 

LUNGS:  Show rhonchi, but no wheezing.  Ventilator is as above. 

HEART:  Regular rate and rhythm with bradycardia. 

ABDOMEN:  Soft.  There is no organomegaly. 

EXTREMITIES:  She has no edema.



LABORATORY DATA:  White count 10,800, hemoglobin 9.6, hematocrit 28, and platelet

count of 74,000.  Blood gas; pH 7.39, CO2 of 50.5, PO2 of 62, bicarbonate 29. 



Chemistries include sodium 137, potassium 4, chloride 98, CO2 of 31, BUN 64,

creatinine 1.3 (post dialysis).  CRP is normal. 



No x-rays obtained today.



IMPRESSION:  

1. COVID pneumonia, having completed trial of therapy.  Unfortunately, she remains

ventilator dependent with high FiO2 and support requirements. 

2. Renal failure, on hemodialysis.

3. Hypertension.  We will get her back on her morning medications.

4. Diabetes.  Blood sugar has been labile in the past 48 hours.  Lantus will be held

and we will continue to control with sliding scale. 



PLAN:  Lopressor is held for blood pressure control given her bradycardia.  If

necessary, additional oral therapies can be provided.  I have asked that her Lantus

be held and we will monitor blood sugars and control with sliding scale.  Ventilator

needs to be unchanged for the past several days consistent with the fixed hypoxic

phase of post COVID pneumonia.  Dialysis is not required today. 



Critical care time, 35 minutes.







Job ID:  [id number]

## 2020-11-01 NOTE — PRG
DATE OF SERVICE:  11/01/2020



SUBJECTIVE:  Ms. Sanches is a 64-year-old  female, who was seen by the Renal

Service for her acute kidney injury/hyperkalemia.  She has been placed on

hemodialysis.  We are currently doing the 4th day of dialysis treatment.  Blood

pressure is noted to be stable.  We will proceed with a 3 L fluid removal only as

tolerated by the patient.  She has tolerated the previous dialysis.  Please note,

this patient was initially admitted for COVID-19 pneumonia. 



OBJECTIVE:  VITAL SIGNS:  Blood pressure is noted at 170/70 with a heart rate of 57,

O2 saturation 92%. 

GENERAL:  Minimally arousable, intubated on ventilator support. 

SKIN:  Adequate turgor. 

HEENT:  She has slightly pale conjunctivae.  Anicteric sclerae.  No neck mass.  No

carotid bruits.  No JVD. 

CHEST:  No deformities. 

LUNGS:  Decreased breath sounds. 

HEART:  Normal sinus rhythm.  No murmur.  No gallops.  No rubs. 

ABDOMEN:  Globular, soft, nontender.  No masses. 

EXTREMITIES:  No edema.  No deformities.



MEDICATIONS:  Of November 1, 2020, were reviewed.



LABORATORY DATA:  Of November 1, 2020; white count 10.8, hemoglobin 9.6.  Sodium

137, potassium 4, chloride 98, carbon dioxide 31, BUN 64, creatinine 1.31, glucose

110, calcium 7.  C-reactive protein is less than 0.5. 



ASSESSMENT AND PLAN:  

1. Acute kidney injury - superimposed hemodynamically-mediated dysfunction.

Improving BUN and creatinine.  She is currently undergoing 3-hour hemodialysis with

fluid removal only as tolerated. 

2. Hyperkalemia, resolved with hemodialysis.

3. Volume overload-maxing out fluid removal with dialysis.

4. COVID-19 pneumonia/acute respiratory failure.  Continue supportive care.

Overall, prognosis remains guarded. 







Job ID:  [id number]

## 2020-11-02 LAB
ANALYZER IN CARDIO: (no result)
ANION GAP SERPL CALC-SCNC: 14 MMOL/L (ref 10–20)
BASE EXCESS STD BLDA CALC-SCNC: 2.9 MEQ/L
BUN SERPL-MCNC: 50 MG/DL (ref 9.8–20.1)
CA-I BLDA-SCNC: 1.03 MMOL/L (ref 1.12–1.3)
CALCIUM SERPL-MCNC: 7.2 MG/DL (ref 7.8–10.44)
CHLORIDE SERPL-SCNC: 97 MMOL/L (ref 98–107)
CO2 SERPL-SCNC: 27 MMOL/L (ref 23–31)
CREAT CL PREDICTED SERPL C-G-VRATE: 66 ML/MIN (ref 70–130)
CRP SERPL-MCNC: 2.96 MG/DL
GLUCOSE SERPL-MCNC: 128 MG/DL (ref 80–115)
HCO3 BLDA-SCNC: 27.1 MEQ/L (ref 22–28)
HCT VFR BLDA CALC: 29 % (ref 36–47)
HGB BLD-MCNC: 10.9 G/DL (ref 12–16)
HGB BLDA-MCNC: 10 G/DL (ref 12–16)
MCH RBC QN AUTO: 30.5 PG (ref 27–31)
MCV RBC AUTO: 87.9 FL (ref 78–98)
MDIFF COMPLETE?: YES
PCO2 BLDA: 39.8 MMHG (ref 35–45)
PH BLDA: 7.45 [PH] (ref 7.35–7.45)
PLATELET # BLD AUTO: 83 THOU/UL (ref 130–400)
PO2 BLDA: 56.8 MMHG (ref 80–?)
POTASSIUM BLD-SCNC: 4.03 MMOL/L (ref 3.7–5.3)
POTASSIUM SERPL-SCNC: 4.1 MMOL/L (ref 3.5–5.1)
RBC # BLD AUTO: 3.58 MILL/UL (ref 4.2–5.4)
SODIUM SERPL-SCNC: 134 MMOL/L (ref 136–145)
SPECIMEN DRAWN FROM PATIENT: (no result)
WBC # BLD AUTO: 16.6 THOU/UL (ref 4.8–10.8)

## 2020-11-02 RX ADMIN — Medication SCH MG: at 08:34

## 2020-11-02 RX ADMIN — Medication SCH ML: at 08:37

## 2020-11-02 RX ADMIN — POLYETHYLENE GLYCOL 400 AND PROPYLENE GLYCOL SCH EACH: 4; 3 SOLUTION/ DROPS OPHTHALMIC at 20:27

## 2020-11-02 RX ADMIN — Medication SCH MG: at 20:22

## 2020-11-02 RX ADMIN — Medication SCH ML: at 21:12

## 2020-11-02 RX ADMIN — BRIMONIDINE TARTRATE SCH DROP: 2 SOLUTION OPHTHALMIC at 21:12

## 2020-11-02 RX ADMIN — DORZOLAMIDE HYDROCHLORIDE AND TIMOLOL MALEATE SCH EACH: 22.3; 6.8 SOLUTION/ DROPS OPHTHALMIC at 08:35

## 2020-11-02 RX ADMIN — POLYETHYLENE GLYCOL 400 AND PROPYLENE GLYCOL SCH EACH: 4; 3 SOLUTION/ DROPS OPHTHALMIC at 08:35

## 2020-11-02 RX ADMIN — DORZOLAMIDE HYDROCHLORIDE AND TIMOLOL MALEATE SCH EACH: 22.3; 6.8 SOLUTION/ DROPS OPHTHALMIC at 20:26

## 2020-11-02 RX ADMIN — POLYVINYL ALCOHOL, POVIDONE SCH EACH: 14; 6 SOLUTION/ DROPS OPHTHALMIC at 08:36

## 2020-11-02 RX ADMIN — BRIMONIDINE TARTRATE SCH DROP: 2 SOLUTION OPHTHALMIC at 08:36

## 2020-11-02 RX ADMIN — DORZOLAMIDE HYDROCHLORIDE AND TIMOLOL MALEATE SCH EACH: 22.3; 6.8 SOLUTION/ DROPS OPHTHALMIC at 15:06

## 2020-11-02 RX ADMIN — Medication SCH UNITS: at 08:44

## 2020-11-02 NOTE — PRG
DATE OF SERVICE:  11/02/2020



SUBJECTIVE:  Myron remains mechanically ventilated.



OBJECTIVE:  VITAL SIGNS:  Heart rate is in the 70s, blood pressure is 169/61.  She

is 21 days into her hospitalization, FiO2 is at 50%. 

LUNGS:  Clear anteriorly. 

HEART:  Regular rhythm. 

ABDOMEN:  Soft. 

EXTREMITIES:  Without asymmetry.



LABORATORY DATA:  White count 16.6, hemoglobin 10.9, and platelets 83,000.  Sodium

134, potassium 4.1, chloride 97, bicarb 27, BUN 50, creatinine 1.25.  Intake and

output reported as positive 1084. 



IMPRESSION:  COVID pneumonia.  She is out of isolation.  She probably will need a

tracheostomy if we can get __________ ventilator settings. 







Job ID:  [id number]

## 2020-11-02 NOTE — PRG
DATE OF SERVICE:  11/02/2020



SUBJECTIVE:  Ms. Sanches is a 64-year-old  female, followed up by the 
Renal

Service for her chronic renal failure.  Her hospitalization has been marred by

development of acute kidney injury and hyperkalemia.  At the same time, she was 
also

volume overloaded and we have initiated dialysis.  She has undergone four days 
of

consecutive dialysis.  Several liters of fluid have been removed.  She was 
initially

admitted for COVID-19 pneumonia and is now in acute respiratory failure and

currently intubated on ventilator support.  No acute events noted last night. 



OBJECTIVE:  VITAL SIGNS:  Blood pressure 145/60, heart rate 68, respiratory rate
26,

and O2 saturation 96%. 

GENERAL:  The patient is currently intubated, on ventilator support. 

SKIN:  Adequate turgor. 

HEENT:  She has slightly pale conjunctivae.  Anicteric sclerae. 

NECK:  No neck mass.  No carotid bruits.  No JVD. 

CHEST:  No deformities. 

LUNGS:  Decreased breath sounds.  No wheezing.  No crackles. 

HEART:  Normal sinus rhythm.  No murmur.  No gallops.  No rubs. 

ABDOMEN:  Globular, soft, and nontender.  No masses. 

EXTREMITIES:  No edema.  No deformities.



MEDICATIONS:  Medications of November 2, 2020, reviewed.



LABORATORY DATA:  Laboratories of November 2, 2020; white count 16.6, hemoglobin

10.9, sodium 134, potassium 4.1, chloride 97, carbon dioxide 27, BUN 50, 
creatinine

1.25, glucose 128, calcium 7.2.  C-reactive protein is 2.96. 



ASSESSMENT AND PLAN:  

1. Acute kidney injury on top of her chronic renal failure, currently on 
maintenance

hemodialysis due to the progressive azotemia.  The patient is noted to be

nonoliguric.  No hemodialysis will be done today.  We will re-evaluate in a.m. 
for 

another dialysis treatment.

2. COVID-19 pneumonia/acute respiratory failure, currently intubated on 
ventilator

support.  Continue supportive care.  Overall, prognosis remains guarded.  
Recheck

CBC and basic met in a.m. 







Job ID:  [id number]



MTDD

## 2020-11-03 LAB
ANALYZER IN CARDIO: (no result)
ANION GAP SERPL CALC-SCNC: 15 MMOL/L (ref 10–20)
BASE EXCESS STD BLDA CALC-SCNC: 1.9 MEQ/L
BUN SERPL-MCNC: 69 MG/DL (ref 9.8–20.1)
CA-I BLDA-SCNC: 1.05 MMOL/L (ref 1.12–1.3)
CALCIUM SERPL-MCNC: 7.1 MG/DL (ref 7.8–10.44)
CHLORIDE SERPL-SCNC: 98 MMOL/L (ref 98–107)
CO2 SERPL-SCNC: 26 MMOL/L (ref 23–31)
CREAT CL PREDICTED SERPL C-G-VRATE: 57 ML/MIN (ref 70–130)
CRP SERPL-MCNC: 4.47 MG/DL
GLUCOSE SERPL-MCNC: 292 MG/DL (ref 80–115)
HCO3 BLDA-SCNC: 25.7 MEQ/L (ref 22–28)
HCT VFR BLDA CALC: 32 % (ref 36–47)
HGB BLD-MCNC: 10.3 G/DL (ref 12–16)
HGB BLDA-MCNC: 10.8 G/DL (ref 12–16)
MCH RBC QN AUTO: 31 PG (ref 27–31)
MCV RBC AUTO: 88 FL (ref 78–98)
MDIFF COMPLETE?: YES
O2 A-A PPRESDIFF RESPIRATORY: 251.38 MMHG (ref 0–20)
PCO2 BLDA: 36.9 MMHG (ref 35–45)
PH BLDA: 7.46 [PH] (ref 7.35–7.45)
PLATELET # BLD AUTO: 85 THOU/UL (ref 130–400)
PO2 BLDA: 59 MMHG (ref 80–?)
POTASSIUM BLD-SCNC: 3.8 MMOL/L (ref 3.7–5.3)
POTASSIUM SERPL-SCNC: 4 MMOL/L (ref 3.5–5.1)
RBC # BLD AUTO: 3.34 MILL/UL (ref 4.2–5.4)
SODIUM SERPL-SCNC: 135 MMOL/L (ref 136–145)
SPECIMEN DRAWN FROM PATIENT: (no result)
WBC # BLD AUTO: 14.7 THOU/UL (ref 4.8–10.8)

## 2020-11-03 RX ADMIN — POLYETHYLENE GLYCOL 400 AND PROPYLENE GLYCOL SCH EACH: 4; 3 SOLUTION/ DROPS OPHTHALMIC at 09:26

## 2020-11-03 RX ADMIN — Medication SCH ML: at 20:37

## 2020-11-03 RX ADMIN — DORZOLAMIDE HYDROCHLORIDE AND TIMOLOL MALEATE SCH EACH: 22.3; 6.8 SOLUTION/ DROPS OPHTHALMIC at 20:36

## 2020-11-03 RX ADMIN — INSULIN LISPRO PRN UNIT: 100 INJECTION, SOLUTION INTRAVENOUS; SUBCUTANEOUS at 20:45

## 2020-11-03 RX ADMIN — GUAIFENESIN SCH MG: 200 SOLUTION ORAL at 20:11

## 2020-11-03 RX ADMIN — DORZOLAMIDE HYDROCHLORIDE AND TIMOLOL MALEATE SCH EACH: 22.3; 6.8 SOLUTION/ DROPS OPHTHALMIC at 09:26

## 2020-11-03 RX ADMIN — Medication SCH UNITS: at 09:38

## 2020-11-03 RX ADMIN — GUAIFENESIN SCH MG: 200 SOLUTION ORAL at 16:05

## 2020-11-03 RX ADMIN — INSULIN LISPRO PRN UNIT: 100 INJECTION, SOLUTION INTRAVENOUS; SUBCUTANEOUS at 16:47

## 2020-11-03 RX ADMIN — GUAIFENESIN SCH MG: 200 SOLUTION ORAL at 14:01

## 2020-11-03 RX ADMIN — POLYETHYLENE GLYCOL 400 AND PROPYLENE GLYCOL SCH EACH: 4; 3 SOLUTION/ DROPS OPHTHALMIC at 20:36

## 2020-11-03 RX ADMIN — Medication SCH MG: at 07:41

## 2020-11-03 RX ADMIN — BRIMONIDINE TARTRATE SCH DROP: 2 SOLUTION OPHTHALMIC at 20:36

## 2020-11-03 RX ADMIN — INSULIN LISPRO PRN UNIT: 100 INJECTION, SOLUTION INTRAVENOUS; SUBCUTANEOUS at 10:31

## 2020-11-03 RX ADMIN — DORZOLAMIDE HYDROCHLORIDE AND TIMOLOL MALEATE SCH EACH: 22.3; 6.8 SOLUTION/ DROPS OPHTHALMIC at 14:02

## 2020-11-03 RX ADMIN — BRIMONIDINE TARTRATE SCH DROP: 2 SOLUTION OPHTHALMIC at 09:25

## 2020-11-03 RX ADMIN — NICARDIPINE HYDROCHLORIDE SCH MLS: 25 INJECTION INTRAVENOUS at 15:33

## 2020-11-03 RX ADMIN — POLYVINYL ALCOHOL, POVIDONE SCH EACH: 14; 6 SOLUTION/ DROPS OPHTHALMIC at 09:27

## 2020-11-03 RX ADMIN — Medication SCH ML: at 09:28

## 2020-11-03 RX ADMIN — Medication SCH MG: at 20:10

## 2020-11-03 RX ADMIN — SCOPALAMINE SCH MG: 1 PATCH, EXTENDED RELEASE TRANSDERMAL at 10:08

## 2020-11-03 NOTE — PRG
DATE OF SERVICE:  11/03/2020



SUBJECTIVE:  Ms. Sanches remains mechanically ventilated.  She is still a little too

much bilevel support to consider for tracheostomy. 



OBJECTIVE:  VITAL SIGNS:  Blood pressure 119/54, heart rate is in the 60s,

respiratory rates in the 20s. 

LUNGS:  Clear. 

HEART:  Regular rhythm. 

ABDOMEN:  Soft.



LABORATORY DATA:  White count 14.7, hemoglobin 10.3, and platelets 85,000.  Sodium

135, potassium 4, chloride 98, bicarb 26, BUN 69, creatinine 1.44, and glucose 292.

PH 7.46, CO2 of 36, pO2 of 79. 



IMPRESSION AND PLAN:  Respiratory failure.  At this point in time, she does not have

an acid-base disorder, so I will stop doing blood gases on her every day. 



We will just have to wait and hope for gradual improvement in gas exchange.  We will

consider for tracheostomy. 







Job ID:  [id number]

## 2020-11-03 NOTE — PRG
DATE OF SERVICE:  11/03/2020



SUBJECTIVE:  Ms. Sanches is a 64-year-old  female, who was initially admitted

for COVID-19 pneumonia.  She went to acute respiratory failure.  She still remains

intubated and the possibility of tracheostomy is being entertained.  She was

initiated on dialysis due to progressive azotemia as well as to volume overload.

She has undergone several days of hemodialysis with fluid removal.  Creatinine is

noted to be stable today.  She is diuresing slightly about 1.3 L of fluid.  However,

the intake is still quite large.  My plan is to try her on Lasix at 80 mg IV q.12h

to enhance diuresis. 



No acute events noted last night.



OBJECTIVE:  VITAL SIGNS:  Blood pressure was 201/83 with heart rate of 94,

respiratory rate 27, O2 saturation 93%. 

GENERAL:  The patient is sedated, intubated, on ventilator support. 

SKIN:  Adequate turgor. 

HEENT:  Pinkish conjunctivae.  Anicteric sclerae.  No neck mass.  No carotid bruits.

 No JVD. 

CHEST:  No deformities. 

LUNGS:  Decreased breath sounds. 

HEART:  Normal sinus rhythm.  No murmur.  No gallops.  No rubs. 

ABDOMEN:  Globular, soft, nontender.  No masses. 

EXTREMITIES:  Trace +1 edema.



MEDICATIONS:  Medications of November 3, 2020, reviewed.



LABORATORY DATA:  Laboratories of November 3, 2020; white count 14.7.  Sodium 135,

potassium 4, chloride 98, carbon dioxide 26, BUN 69, creatinine 1.44, glucose 292,

calcium 7.1.  C-reactive protein is 4.4, ferritin 320. 



ASSESSMENT AND PLAN:  

1. Volume overload - start Lasix at 80 mg IV q.12h.  We will hold dialysis to see

how she will respond with diuretics alone.  If needed, we will reschedule her back

to dialysis tomorrow. 

2. Acute kidney injury/chronic renal failure - volume overload - the patient

received several days of dialysis.  Holding dialysis for the moment.  Empiric

challenge with diuretics to enhance urine output. 

3. COVID-19 pneumonia/acute respiratory failure.  Continue supportive care.

Possible tracheostomy placement. 

Agree with current management.







Job ID:  [id number]

## 2020-11-04 LAB
ANION GAP SERPL CALC-SCNC: 16 MMOL/L (ref 10–20)
BUN SERPL-MCNC: 87 MG/DL (ref 9.8–20.1)
CALCIUM SERPL-MCNC: 7.2 MG/DL (ref 7.8–10.44)
CHLORIDE SERPL-SCNC: 100 MMOL/L (ref 98–107)
CO2 SERPL-SCNC: 24 MMOL/L (ref 23–31)
CREAT CL PREDICTED SERPL C-G-VRATE: 51 ML/MIN (ref 70–130)
CRP SERPL-MCNC: 5.26 MG/DL
GLUCOSE SERPL-MCNC: 206 MG/DL (ref 80–115)
HGB BLD-MCNC: 10.2 G/DL (ref 12–16)
MCH RBC QN AUTO: 30.7 PG (ref 27–31)
MCV RBC AUTO: 87.4 FL (ref 78–98)
MDIFF COMPLETE?: YES
PLATELET # BLD AUTO: 91 THOU/UL (ref 130–400)
POTASSIUM SERPL-SCNC: 3.2 MMOL/L (ref 3.5–5.1)
RBC # BLD AUTO: 3.3 MILL/UL (ref 4.2–5.4)
SODIUM SERPL-SCNC: 137 MMOL/L (ref 136–145)
WBC # BLD AUTO: 16.4 THOU/UL (ref 4.8–10.8)

## 2020-11-04 PROCEDURE — 0W9930Z DRAINAGE OF RIGHT PLEURAL CAVITY WITH DRAINAGE DEVICE, PERCUTANEOUS APPROACH: ICD-10-PCS | Performed by: THORACIC SURGERY (CARDIOTHORACIC VASCULAR SURGERY)

## 2020-11-04 PROCEDURE — 0W9B30Z DRAINAGE OF LEFT PLEURAL CAVITY WITH DRAINAGE DEVICE, PERCUTANEOUS APPROACH: ICD-10-PCS | Performed by: THORACIC SURGERY (CARDIOTHORACIC VASCULAR SURGERY)

## 2020-11-04 RX ADMIN — BRIMONIDINE TARTRATE SCH DROP: 2 SOLUTION OPHTHALMIC at 20:05

## 2020-11-04 RX ADMIN — Medication SCH ML: at 20:06

## 2020-11-04 RX ADMIN — GUAIFENESIN SCH MG: 200 SOLUTION ORAL at 20:04

## 2020-11-04 RX ADMIN — BACTERIOSTATIC WATER PRN ML: 1 INJECTION, SOLUTION INTRAMUSCULAR; INTRAVENOUS; SUBCUTANEOUS at 08:43

## 2020-11-04 RX ADMIN — GUAIFENESIN SCH MG: 200 SOLUTION ORAL at 01:36

## 2020-11-04 RX ADMIN — INSULIN LISPRO PRN UNIT: 100 INJECTION, SOLUTION INTRAVENOUS; SUBCUTANEOUS at 22:11

## 2020-11-04 RX ADMIN — NICARDIPINE HYDROCHLORIDE SCH MLS: 25 INJECTION INTRAVENOUS at 10:29

## 2020-11-04 RX ADMIN — BRIMONIDINE TARTRATE SCH DROP: 2 SOLUTION OPHTHALMIC at 08:44

## 2020-11-04 RX ADMIN — GUAIFENESIN SCH MG: 200 SOLUTION ORAL at 17:13

## 2020-11-04 RX ADMIN — DORZOLAMIDE HYDROCHLORIDE AND TIMOLOL MALEATE SCH EACH: 22.3; 6.8 SOLUTION/ DROPS OPHTHALMIC at 20:05

## 2020-11-04 RX ADMIN — NICARDIPINE HYDROCHLORIDE SCH MLS: 25 INJECTION INTRAVENOUS at 01:00

## 2020-11-04 RX ADMIN — Medication SCH MG: at 20:04

## 2020-11-04 RX ADMIN — GUAIFENESIN SCH MG: 200 SOLUTION ORAL at 08:50

## 2020-11-04 RX ADMIN — DORZOLAMIDE HYDROCHLORIDE AND TIMOLOL MALEATE SCH EACH: 22.3; 6.8 SOLUTION/ DROPS OPHTHALMIC at 16:00

## 2020-11-04 RX ADMIN — INSULIN LISPRO PRN UNIT: 100 INJECTION, SOLUTION INTRAVENOUS; SUBCUTANEOUS at 16:40

## 2020-11-04 RX ADMIN — Medication SCH MG: at 08:43

## 2020-11-04 RX ADMIN — GUAIFENESIN SCH MG: 200 SOLUTION ORAL at 04:58

## 2020-11-04 RX ADMIN — Medication SCH ML: at 09:10

## 2020-11-04 RX ADMIN — POLYETHYLENE GLYCOL 400 AND PROPYLENE GLYCOL SCH EACH: 4; 3 SOLUTION/ DROPS OPHTHALMIC at 20:06

## 2020-11-04 RX ADMIN — POLYVINYL ALCOHOL, POVIDONE SCH EACH: 14; 6 SOLUTION/ DROPS OPHTHALMIC at 08:43

## 2020-11-04 RX ADMIN — DORZOLAMIDE HYDROCHLORIDE AND TIMOLOL MALEATE SCH EACH: 22.3; 6.8 SOLUTION/ DROPS OPHTHALMIC at 08:44

## 2020-11-04 RX ADMIN — INSULIN LISPRO PRN UNIT: 100 INJECTION, SOLUTION INTRAVENOUS; SUBCUTANEOUS at 04:22

## 2020-11-04 RX ADMIN — POLYETHYLENE GLYCOL 400 AND PROPYLENE GLYCOL SCH EACH: 4; 3 SOLUTION/ DROPS OPHTHALMIC at 08:44

## 2020-11-04 RX ADMIN — INSULIN LISPRO PRN UNIT: 100 INJECTION, SOLUTION INTRAVENOUS; SUBCUTANEOUS at 10:00

## 2020-11-04 RX ADMIN — GUAIFENESIN SCH MG: 200 SOLUTION ORAL at 12:43

## 2020-11-04 RX ADMIN — Medication SCH UNITS: at 09:10

## 2020-11-04 NOTE — PRG
DATE OF SERVICE:  11/04/2020



SUBJECTIVE:  Ms. Sanches is a 64-year-old  female, who was admitted for

COVID-19 pneumonia.  The patient has gone to acute respiratory failure, is currently

still intubated on ventilator support.  We are unable to wean off this patient.  I

attempted to use Lasix 80 mg IV b.i.d. to see if I could improve her volume status.

She did respond in a minimal manner.  My bias is to proceed with another dialytic

intervention today for fluid removal. 



OBJECTIVE:  VITAL SIGNS:  Blood pressure is noted at 156/68, heart rate 82,

respiratory rate 28, O2 saturation 93%. 

GENERAL:  Intubated on ventilator support. 

SKIN:  Adequate turgor. 

HEENT:  She has pinkish conjunctivae.  Anicteric sclerae. 

NECK:  No neck mass.  No carotid bruits.  No JVD. 

CHEST:  No deformities. 

LUNGS:  Decreased breath sounds. 

HEART:  Normal sinus rhythm.  No murmurs, gallops, or rubs. 

ABDOMEN:  Globular, soft, nontender.  No masses. 

EXTREMITIES:  No edema.  No deformities.



MEDICATIONS:  Medications of November 4, 2020, was reviewed.



LABORATORY DATA:  Laboratories of November 4, 2020; white count 16.4, hemoglobin

10.2.  Sodium 137, potassium 3.2, chloride 100, carbon dioxide 24, BUN 87,

creatinine 1.58, glucose 206, calcium 7.2.  C-reactive protein 5.26. 



ASSESSMENT AND PLAN:  

1. Acute kidney injury/volume overload.  Creatinine noted 1.58.  However, the

patient is still volume overloaded.  Minimally responsive to diuretics.  We will go

ahead and proceed with another dialytic intervention at least 3 hours between 2 and

3 L of fluid removal. 

2. Hypokalemia.  We will try to correct this with dialysis.  We will use potassium

bath 

of 4.0 with the dialysis treatment.

3. Acute respiratory failure/COVID-19 pneumonia.  Continue supportive care.  As per

recommendation by Pulmonary Medicine, the patient may be a candidate for

tracheostomy.  Overall, prognosis remains guarded. 







Job ID:  [id number]

## 2020-11-04 NOTE — PRG
DATE OF SERVICE:  11/04/2020



SUBJECTIVE:  Ms. Sanches has been hemodynamically stable.  She is still requiring

dialysis. 



Blood pressure is 156/76, heart rate is in the 90s.  Oximetry is in the high 90s

earlier.  Her PEEP was turned from 10 to 8, but during dialysis, she desaturated to

her FiO2 and her PEEP has been turned back up.  We will try to get her PEEP back

down to more acceptable level.  She still has __________ ventilation requirements to

proceed forward with a trach. 



OBJECTIVE:  LUNGS:  Clear. 

HEART:  Regular rhythm. 

ABDOMEN:  Soft. 

EXTREMITIES:  Without edema.



LABORATORY DATA:  White count 16.4, hemoglobin 10.2, and platelets 91,000.  Sodium

137, potassium 3.2, chloride 100, bicarb 24, BUN 87, creatinine 1.58. 



IMPRESSION:  

1. COVID pneumonia, respiratory failure, now in the hospital 23 days.

2. Hypertension.

3. Glaucoma, on her eyedrops.

4. Encephalopathy associated with this.  Sedation has been held today and she is

slow to awaken, but she has never had a loss of brain perfusion.  It is unlikely she

has had a bleed or stroke, but this will be kept in mind if she fails to awaken with

holding her sedation.  Haldol will be given to see if this helps with her tachypnea

when the sedation is held. 

5. Hypothyroidism, on replacement.

6. Acute renal failure, on dialysis.

7. Diabetes.

8. Obesity.

9. 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



PLAN:  Continue supportive care.  Hopefully, we can decrease her FiO2 and PEEP

gradually and proceed to a tracheostomy early next week. 







Job ID:  [id number]

## 2020-11-04 NOTE — PDOC.PALFU
Palliative Care Follow-up Note





Attempted to coordinate family meeting with children to further discuss Goal of 

care and address resuscitation status.  Family to relay time for meeting 

11/5/2020

## 2020-11-05 LAB
ANALYZER IN CARDIO: (no result)
ANION GAP SERPL CALC-SCNC: 16 MMOL/L (ref 10–20)
BASE EXCESS STD BLDA CALC-SCNC: 4.7 MEQ/L
BUN SERPL-MCNC: 65 MG/DL (ref 9.8–20.1)
CA-I BLDA-SCNC: 0.97 MMOL/L (ref 1.12–1.3)
CALCIUM SERPL-MCNC: 7.4 MG/DL (ref 7.8–10.44)
CHLORIDE SERPL-SCNC: 94 MMOL/L (ref 98–107)
CO2 SERPL-SCNC: 28 MMOL/L (ref 23–31)
CREAT CL PREDICTED SERPL C-G-VRATE: 57 ML/MIN (ref 70–130)
CRP SERPL-MCNC: 13.97 MG/DL
GLUCOSE SERPL-MCNC: 325 MG/DL (ref 80–115)
HCO3 BLDA-SCNC: 29.1 MEQ/L (ref 22–28)
HCT VFR BLDA CALC: 18 % (ref 36–47)
HGB BLD-MCNC: 10.2 G/DL (ref 12–16)
HGB BLDA-MCNC: 6.1 G/DL (ref 12–16)
MCH RBC QN AUTO: 30.7 PG (ref 27–31)
MCV RBC AUTO: 88.5 FL (ref 78–98)
MDIFF COMPLETE?: YES
PCO2 BLDA: 42.6 MMHG (ref 35–45)
PH BLDA: 7.45 [PH] (ref 7.35–7.45)
PLATELET # BLD AUTO: 103 THOU/UL (ref 130–400)
PO2 BLDA: 64.3 MMHG (ref 80–?)
POTASSIUM BLD-SCNC: 3.74 MMOL/L (ref 3.7–5.3)
POTASSIUM SERPL-SCNC: 3.7 MMOL/L (ref 3.5–5.1)
RBC # BLD AUTO: 3.33 MILL/UL (ref 4.2–5.4)
SODIUM SERPL-SCNC: 134 MMOL/L (ref 136–145)
SPECIMEN DRAWN FROM PATIENT: (no result)
WBC # BLD AUTO: 13.4 THOU/UL (ref 4.8–10.8)

## 2020-11-05 PROCEDURE — 8E0ZXY6 ISOLATION: ICD-10-PCS | Performed by: INTERNAL MEDICINE

## 2020-11-05 RX ADMIN — Medication SCH MG: at 20:01

## 2020-11-05 RX ADMIN — INSULIN LISPRO PRN UNIT: 100 INJECTION, SOLUTION INTRAVENOUS; SUBCUTANEOUS at 23:09

## 2020-11-05 RX ADMIN — POLYETHYLENE GLYCOL 400 AND PROPYLENE GLYCOL SCH EACH: 4; 3 SOLUTION/ DROPS OPHTHALMIC at 09:02

## 2020-11-05 RX ADMIN — Medication SCH UNITS: at 08:59

## 2020-11-05 RX ADMIN — BRIMONIDINE TARTRATE SCH DROP: 2 SOLUTION OPHTHALMIC at 09:02

## 2020-11-05 RX ADMIN — GUAIFENESIN SCH MG: 200 SOLUTION ORAL at 04:11

## 2020-11-05 RX ADMIN — GUAIFENESIN SCH MG: 200 SOLUTION ORAL at 12:39

## 2020-11-05 RX ADMIN — Medication SCH ML: at 09:13

## 2020-11-05 RX ADMIN — POLYVINYL ALCOHOL, POVIDONE SCH EACH: 14; 6 SOLUTION/ DROPS OPHTHALMIC at 09:03

## 2020-11-05 RX ADMIN — GUAIFENESIN SCH MG: 200 SOLUTION ORAL at 09:01

## 2020-11-05 RX ADMIN — INSULIN LISPRO PRN UNIT: 100 INJECTION, SOLUTION INTRAVENOUS; SUBCUTANEOUS at 15:53

## 2020-11-05 RX ADMIN — DORZOLAMIDE HYDROCHLORIDE AND TIMOLOL MALEATE SCH EACH: 22.3; 6.8 SOLUTION/ DROPS OPHTHALMIC at 14:48

## 2020-11-05 RX ADMIN — DORZOLAMIDE HYDROCHLORIDE AND TIMOLOL MALEATE SCH EACH: 22.3; 6.8 SOLUTION/ DROPS OPHTHALMIC at 09:02

## 2020-11-05 RX ADMIN — Medication SCH MG: at 08:59

## 2020-11-05 RX ADMIN — BRIMONIDINE TARTRATE SCH DROP: 2 SOLUTION OPHTHALMIC at 20:02

## 2020-11-05 RX ADMIN — DORZOLAMIDE HYDROCHLORIDE AND TIMOLOL MALEATE SCH EACH: 22.3; 6.8 SOLUTION/ DROPS OPHTHALMIC at 20:04

## 2020-11-05 RX ADMIN — INSULIN LISPRO PRN UNIT: 100 INJECTION, SOLUTION INTRAVENOUS; SUBCUTANEOUS at 10:01

## 2020-11-05 RX ADMIN — POLYETHYLENE GLYCOL 400 AND PROPYLENE GLYCOL SCH EACH: 4; 3 SOLUTION/ DROPS OPHTHALMIC at 20:03

## 2020-11-05 RX ADMIN — GUAIFENESIN SCH MG: 200 SOLUTION ORAL at 16:38

## 2020-11-05 RX ADMIN — Medication SCH ML: at 20:03

## 2020-11-05 RX ADMIN — GUAIFENESIN SCH MG: 200 SOLUTION ORAL at 01:19

## 2020-11-05 RX ADMIN — INSULIN LISPRO PRN UNIT: 100 INJECTION, SOLUTION INTRAVENOUS; SUBCUTANEOUS at 04:25

## 2020-11-05 RX ADMIN — GUAIFENESIN SCH MG: 200 SOLUTION ORAL at 20:01

## 2020-11-05 NOTE — CON
DATE OF CONSULTATION:  11/04/2020



HISTORY OF PRESENT ILLNESS:  I was called by Dr. Rascon from the CT scanner.  This is

a lady, who has been in the hospital with COVID-associated pneumonia on the

ventilator.  She is over a month out from her initial diagnosis, so is no longer

considered infected. 



She has on CT scan bilateral pneumothoraces.  She is going to remain on the

ventilator for the feasible future, and I was asked to place chest tubes. 



PAST MEDICAL HISTORY:  Unknown.



PAST SURGICAL HISTORY:  Unknown.



CURRENT MEDICATIONS:  Have been reviewed.



ALLERGIES:  HAVE BEEN REVIEWED.



PHYSICAL EXAMINATION:

GENERAL:  This is a sedated, paralyzed patient on the ventilator. 

LUNGS:  Have coarse breath sounds bilaterally. 

CARDIAC:  Heart rhythm is regular. 

EXTREMITIES:  There is no edema.



ASSESSMENT AND PLAN:  Bilateral pneumothoraces with chest tube placement.







Job ID:  [id number]

## 2020-11-05 NOTE — CT
CT of the chest without contrast:

11/5/2020



HISTORY: Evaluate soft tissue gas, pneumothoraces and pneumomediastinum seen on recent neck CT



TECHNIQUE: Axial CT imaging without contrast at 5 mm intervals through the chest with coronal and sag
ittal reformatted imaging



FINDINGS: Endotracheal tube and nasogastric tube in place.



The lack of contrast media limits assessment of the imaged viscera, the vascular structures, and for 
lymphadenopathy.



There is extensive bilateral soft tissue gas within the imaged portion of the neck, left greater than
 right, deep and superficial to the left neck vasculature and left sternocleidomastoid muscle. Soft

tissue gas and circles the airway/thyroid gland/esophagus within the imaged portions of the neck and 
there is soft tissue gas in the supraclavicular region bilaterally, right greater than left.



There is extensive pneumomediastinum.



Bilateral pneumothoraces are noted, small to moderate in size.



Small volume pericardial gas is a possibility.



Limited assessment of the upper abdomen demonstrates cholecystectomy clips.



Trace left and small right pleural effusion.



Extensive atherosclerotic calcification of the coronary arterial vasculature and the imaged aorta. Pr
obable small volume posterior inferior pericardial fluid.



Severe extensive groundglass opacity noted within bilateral upper lobes and right middle lobe with sl
ight sparing involving the superior most aspect of bilateral upper lobes. Dense consolidative

change within bilateral lower lobes with air bronchogram formation and probable mild diffuse bronchie
ctatic change. Within the inferior peripheral aspect of the right lower lobe there are subpleural

areas of cystic change which may signify fibrotic change associated with chronic interstitial disease
 or could be related to Covid pneumonia with associated advanced fibrotic the disease. Pulmonary

interstitial emphysema is a in the right lung base laterally.



Review of the osseous structures demonstrates no worrisome lytic or blastic bone lesions.



IMPRESSION: Extensive pneumomediastinum and subcutaneous gas extending into the neck. Bilateral pneum
othoraces. Extensive pulmonary parenchymal opacity consistent with the patient's history of Covid

pneumonia.



Results discussed with Dr. Rascon at 11:00 AM 11/5/2020



Reported By: Capo Almonte 

Electronically Signed:  11/5/2020 11:10 AM

## 2020-11-05 NOTE — PRG
DATE OF SERVICE:  11/05/2020



SUBJECTIVE:  Ms. Sanches is a 64-year-old  female, who was initially admitted

for COVID-19 infection/pneumonia/respiratory failure.  We are following up this

patient for her progressive azotemia and volume overload as well as hyperkalemia.

She has been initiated on hemodialysis.  She underwent dialysis yesterday and we

were able to remove about 2 L of fluid.  We have also maintained her on Lasix at 80

mg IV q.12h. 



No acute events noted last night.  A repeat CT scan of the brain was done this

morning-November 5, 2020, and it showed significant interval progression of the

chronic ischemic small-vessel disease.  There is also incidental finding of

extensive retropharyngeal/parapharyngeal gas partially visualized. 



Recommendation is chest x-ray.



OBJECTIVE:  VITAL SIGNS:  Blood pressure is currently noted at 130/52, heart rate

72, respiratory rate 23, O2 saturation 99%. 

GENERAL:  The patient is unresponsive, intubated, on ventilator support. 

SKIN:  Adequate turgor. 

HEENT:  She has slightly pale conjunctivae.  Anicteric sclerae.  No neck mass.  No

carotid bruits.  No JVD. 

CHEST:  No deformities. 

LUNGS:  Decreased breath sounds. 

HEART:  Normal sinus rhythm.  No murmur.  No gallops.  No rubs. 

ABDOMEN:  Globular, soft, nontender.  No masses. 

EXTREMITIES:  No edema.



MEDICATIONS:  Medications of November 5, 2020, were reviewed.



LABORATORY DATA:  Laboratories of November 5, 2020; white count 13.4, hemoglobin

10.2.  Sodium 134, potassium 3.7, chloride 94, carbon dioxide 28, BUN 65, creatinine

1.41, glucose 325, ferritin 499.  C-reactive protein 13.97. 



ASSESSMENT AND PLAN:  

1. Acute kidney injury/chronic renal failure.  Continuing dialysis for fluid

removal.  We were able to remove 2 L of fluid with her.  The plan is to maintain her

on the current dose of Lasix.  I will re-evaluate this patient for any need for

dialysis. 

2. COVID-19 pneumonia/acute respiratory failure, still intubated.  Possibility of

tracheostomy is being entertained for next week. 

3. Decreased mentation/encephalopathy.  Some progression of the chronic small

ischemic disease noted on the CT scan today. 

Incidental finding of gas partially visualized in the parapharyngeal area. 



The patient was previously on empiric IV antibiotics.  She is no longer on

antibiotics.  Unclear what the significant gas - this could be an underlying

infection with this patient. 



As per recommendation, chest x-ray will be done.







Job ID:  [id number]

## 2020-11-05 NOTE — RAD
Portable frontal chest radiograph:

11/5/2020



COMPARISON: 10/30/2020



HISTORY: Subcutaneous gas noted within the neck and head on recent CT



FINDINGS: Severe extensive interstitial opacity/groundglass disease throughout both lungs with a yvonne
hilar/bibasilar predominance consistent with the patient's history of Covid pneumonia. Stable

endotracheal tube and nasogastric tube. There is new subcutaneous gas in the supraclavicular regions 
and at the base of the neck bilaterally. New bilateral pneumothoraces are noted, right greater than

left. Extensive new pneumomediastinum. Findings are better assessed on the chest CT also performed 11
/5/2020.



IMPRESSION: Interval development of subcutaneous emphysema and extensive pneumomediastinum with small
 bilateral pneumothoraces. Parenchymal opacities consistent with Covid pneumonia.



Reported By: Capo Almonte 

Electronically Signed:  11/5/2020 11:33 AM

## 2020-11-05 NOTE — PDOC.FMACP
Advance Care Planning





- Problem


(1) Palliative care encounter


Status: Acute   Code(s): Z51.5 - ENCOUNTER FOR PALLIATIVE CARE   





(2) Acute respiratory failure with hypoxia


Status: Acute   Code(s): J96.01 - ACUTE RESPIRATORY FAILURE WITH HYPOXIA   





(3) Pneumonia due to COVID-19 virus


Status: Acute   Code(s): U07.1 - COVID-19; J12.89 - OTHER VIRAL PNEUMONIA   





(4) Thrombocytopenia


Status: Acute   Code(s): D69.6 - THROMBOCYTOPENIA, UNSPECIFIED   





(5) Acute on chronic kidney failure


Status: Acute   Code(s): N17.9 - ACUTE KIDNEY FAILURE, UNSPECIFIED; N18.9 - 

CHRONIC KIDNEY DISEASE, UNSPECIFIED   





- Note


Participants: family, surrogate decision-maker, palliative care


Summary: 


Plliative Care introduced Advanced Care Planning.  The diagnosis, prognosis and 

goals of care were discussed.  Appropriate forms and documentation to accomplish

the goals of care were discussed.  All questions were answered.  





Family elected Mindy Loera patient daughter as Surrogate decision maker/and 

primary contact for information.


[phone]





Discussed at length disease trajectory and resuscitation status. Discussed 

impact of cpr currently if needed on her mother.  Confirmed that DNAR did not 

imply no further care.  Lengthy conversation in relation to the distinction.





Mindy will revisit with her siblings, but for now remain with full 

resuscitaiton.





Please see Palliative care notes in note section.





Palliative care will revisit resuscitation and readdress goal of care with the 

patient children.   





Time Spent (mins): 45

## 2020-11-05 NOTE — CT
CT neck soft tissues noncontrast:

11/5/2020



HISTORY:

64-year-old female with retropharyngeal gas noted on brain CT earlier today



FINDINGS:

There are bilateral pneumothoraces, incompletely imaged. There is extensive, diffuse pneumomediastinu
m. That subcutaneous emphysema dissects superiorly into the neck, extending into multiple spaces,

including retropharyngeal, bilateral posterior cervical, right supraclavicular, bilateral parapharyng
eal, bilateral submandibular, bilateral , and bilateral sublingual, spaces.



Endotracheal tube distal tip is in the mid thoracic trachea approximate 5 cm superior to the lucas. 
Orogastric tube is in the esophagus, distal portion not imaged.



Partial visualization of right pleural effusion.

Severe groundglass densities throughout all visualized portions of the bilateral upper lobes and supe
rior segments of bilateral lower lobes, but with relative sparing of far apical segment of right

upper lobe. Additional superimposed multifocal patchy consolidations in upper lobes and superior segm
ents of lower lobes.



IMPRESSION:

1. Extensive subcutaneous emphysema throughout multiple spaces of the neck, apparently originating fr
om the mediastinum. Pneumomediastinum and bilateral pneumothorax. Presumably due to barotrauma.

2. Severe groundglass opacification of most of the visualized upper lung zones: Probably COVID-19 pne
umonia and or other cause of pulmonary edema.

3. Additional multifocal patchy consolidations in the bilateral lungs also consistent with pneumonia.




Reported By: Hardeep Dominguez 

Electronically Signed:  11/5/2020 10:50 AM

## 2020-11-05 NOTE — CT
CT head noncontrast



HISTORY: Altered mental status. Coma.



COMPARISON: 9/23/2018.



FINDINGS: There is no evidence of acute intracranial hemorrhage or infarct. Encephalomalacia at the w
daniel matter of each cerebral hemisphere has progressed, likely related to chronic ischemic small

vessel disease. There is no mass effect or shift of midline structures.



Partial mucosal opacification of the mastoid air cells.



Inferiormost images show gas within the retropharyngeal space and along each parapharyngeal space, le
ft greater than right. This extends to the inferiormost image at the level of C2. Endotracheal

catheter partially visualized.







IMPRESSION :

Significant interval progression of chronic ischemic small vessel disease. No acute hemorrhage eviden
t.



Extensive retropharyngeal/parapharyngeal gas partially visualized. Please consider chest radiograph t
o evaluate for extent of mediastinal involvement or any chest involvement.





Findings were called to Dr. Gupta at the time of the exam.

Code CR.



Transcribed Date/Time: 11/5/2020 8:44 AM



Reported By: VICK Gimenez 

Electronically Signed:  11/5/2020 9:07 AM

## 2020-11-05 NOTE — OP
DATE OF PROCEDURE:  11/04/2020



PREOPERATIVE DIAGNOSIS:  Bilateral pneumothoraces.



PROCEDURE PERFORMED:  Bilateral chest tube placement.



ANESTHESIA:  1% lidocaine for local.



DESCRIPTION OF PROCEDURE:  Left chest wall was prepped and draped in usual sterile

fashion.  Skin and subcutaneous tissues were infiltrated with lidocaine.  The skin

incision was made and sharp dissection used to enter the chest cavity approximately

the inframammary fold.  The finger sweep was performed.  There were no adhesions.  A

28-Bahamian chest tube was passed approximately 15 cm and secured to the skin with

silk suture.  There was good initial air rush and small air leak on connecting to

suction. 



Right chest wall was prepped and draped in usual sterile fashion.  Skin and

subcutaneous tissues were infiltrated with 1% lidocaine.  Skin incision was made.

Sharp dissection was used to enter the chest in a similar fashion.  Finger sweep was

performed.  There were some adhesions to the chest wall, which were bluntly taken

down.  Once I had a free space, the tube was carefully inserted along the chest wall

and slid easily to approximately 15 cm.  __________ connection to the atrium, there

was an air leak. 



Followup chest x-ray shows good tube placement with bilateral re-expansion of both

lungs. 







Job ID:  [id number]

## 2020-11-05 NOTE — RAD
EXAM:

XR Chest 1 View



PROVIDED CLINICAL HISTORY:

Respiratory insufficiency



COMPARISON:

11/5/2020 10:46 AM



FINDINGS:

Cardiac and mediastinal silhouette is unchanged in appearance. Pneumomediastinum and bilateral pneumo
thoraces are redemonstrated. Enteric catheter tip is not visualized but below the diaphragm.

Endotracheal tube in similar position. Interval placement of bilateral chest tubes. Extensive bilater
al pulmonary parenchymal opacity persists.



IMPRESSION:

Placement of bilateral chest tubes.



Reported By: Tommy Shipman 

Electronically Signed:  11/5/2020 11:35 AM

## 2020-11-05 NOTE — PRG
DATE OF SERVICE:  11/05/2020



SUBJECTIVE:  Ms. Sanches is not awakening.  She is tachypneic when her sedation is

held.  She will not open her eyes.  She will not follow commands. 



CT scan of her head was done today, showed no evidence of a thrombotic event or

bleed.  She does have retropharyngeal gas, not sure what to do with that finding.  I

will order a CT of her neck without contrast. 



LUNGS:  Remarkable for coarse equal breath sounds. 

HEART:  Regular rhythm. 

ABDOMEN:  Soft. 



We were able to switch her to volume ventilation today.  Hopefully, she will

tolerate this.  She is not a candidate for tracheostomy output, may be early next

week.  She had started to urinate more briskly with Lasix this morning. 



IMPRESSION:  

1. COVID pneumonia.

2. Obesity.

3. Deconditioning.

4. Critical illness encephalopathy.

5. Retropharyngeal gas of unclear etiology.  CT of her neck will be ordered. 

We are trying to hold sedation to see if she wakes up.  We are unsuccessful and she

just becomes more tachypneic.  We may have to put her on Precedex or back on some

other form of sedation, but it would be nice to keep her off sedation for a few days

to see if she wakes up. 



CRITICAL CARE TIME:  30 minutes.







Job ID:  [id number]

## 2020-11-06 LAB
ANION GAP SERPL CALC-SCNC: 15 MMOL/L (ref 10–20)
BUN SERPL-MCNC: 93 MG/DL (ref 9.8–20.1)
CALCIUM SERPL-MCNC: 6.8 MG/DL (ref 7.8–10.44)
CHLORIDE SERPL-SCNC: 97 MMOL/L (ref 98–107)
CO2 SERPL-SCNC: 28 MMOL/L (ref 23–31)
CREAT CL PREDICTED SERPL C-G-VRATE: 41 ML/MIN (ref 70–130)
CRP SERPL-MCNC: 10.8 MG/DL
GLUCOSE SERPL-MCNC: 284 MG/DL (ref 80–115)
HGB BLD-MCNC: 5.4 G/DL (ref 12–16)
MCH RBC QN AUTO: 30.7 PG (ref 27–31)
MCV RBC AUTO: 90.5 FL (ref 78–98)
MDIFF COMPLETE?: YES
PLATELET # BLD AUTO: 109 THOU/UL (ref 130–400)
POTASSIUM SERPL-SCNC: 3.6 MMOL/L (ref 3.5–5.1)
RBC # BLD AUTO: 1.77 MILL/UL (ref 4.2–5.4)
SODIUM SERPL-SCNC: 136 MMOL/L (ref 136–145)
WBC # BLD AUTO: 11.7 THOU/UL (ref 4.8–10.8)

## 2020-11-06 RX ADMIN — GUAIFENESIN SCH MG: 200 SOLUTION ORAL at 20:36

## 2020-11-06 RX ADMIN — GUAIFENESIN SCH MG: 200 SOLUTION ORAL at 09:05

## 2020-11-06 RX ADMIN — INSULIN LISPRO PRN UNIT: 100 INJECTION, SOLUTION INTRAVENOUS; SUBCUTANEOUS at 21:08

## 2020-11-06 RX ADMIN — Medication SCH MG: at 20:35

## 2020-11-06 RX ADMIN — INSULIN LISPRO PRN UNIT: 100 INJECTION, SOLUTION INTRAVENOUS; SUBCUTANEOUS at 10:29

## 2020-11-06 RX ADMIN — SCOPALAMINE SCH MG: 1 PATCH, EXTENDED RELEASE TRANSDERMAL at 13:32

## 2020-11-06 RX ADMIN — Medication SCH ML: at 09:00

## 2020-11-06 RX ADMIN — Medication SCH ML: at 21:13

## 2020-11-06 RX ADMIN — BRIMONIDINE TARTRATE SCH DROP: 2 SOLUTION OPHTHALMIC at 20:38

## 2020-11-06 RX ADMIN — Medication SCH MG: at 08:56

## 2020-11-06 RX ADMIN — GUAIFENESIN SCH MG: 200 SOLUTION ORAL at 05:16

## 2020-11-06 RX ADMIN — GUAIFENESIN SCH MG: 200 SOLUTION ORAL at 13:32

## 2020-11-06 RX ADMIN — POLYETHYLENE GLYCOL 400 AND PROPYLENE GLYCOL SCH EACH: 4; 3 SOLUTION/ DROPS OPHTHALMIC at 21:03

## 2020-11-06 RX ADMIN — GUAIFENESIN SCH MG: 200 SOLUTION ORAL at 16:56

## 2020-11-06 RX ADMIN — BRIMONIDINE TARTRATE SCH DROP: 2 SOLUTION OPHTHALMIC at 09:52

## 2020-11-06 RX ADMIN — DORZOLAMIDE HYDROCHLORIDE AND TIMOLOL MALEATE SCH EACH: 22.3; 6.8 SOLUTION/ DROPS OPHTHALMIC at 16:56

## 2020-11-06 RX ADMIN — INSULIN LISPRO PRN UNIT: 100 INJECTION, SOLUTION INTRAVENOUS; SUBCUTANEOUS at 05:00

## 2020-11-06 RX ADMIN — Medication SCH UNITS: at 08:57

## 2020-11-06 RX ADMIN — POLYVINYL ALCOHOL, POVIDONE SCH EACH: 14; 6 SOLUTION/ DROPS OPHTHALMIC at 09:58

## 2020-11-06 RX ADMIN — DORZOLAMIDE HYDROCHLORIDE AND TIMOLOL MALEATE SCH EACH: 22.3; 6.8 SOLUTION/ DROPS OPHTHALMIC at 09:52

## 2020-11-06 RX ADMIN — DORZOLAMIDE HYDROCHLORIDE AND TIMOLOL MALEATE SCH EACH: 22.3; 6.8 SOLUTION/ DROPS OPHTHALMIC at 20:44

## 2020-11-06 RX ADMIN — GUAIFENESIN SCH MG: 200 SOLUTION ORAL at 00:25

## 2020-11-06 RX ADMIN — POLYETHYLENE GLYCOL 400 AND PROPYLENE GLYCOL SCH EACH: 4; 3 SOLUTION/ DROPS OPHTHALMIC at 09:53

## 2020-11-06 NOTE — PRG
DATE OF SERVICE:  11/06/2020



SUBJECTIVE:  Ms. Sanches is a 64-year-old  female with chronic renal 
failure

and followed up by the Renal Service for her worsening renal dysfunction.  She 
has

been placed on hemodialysis due to the initial hyperkalemia and volume overload.
 In

the interim, she developed bilateral pneumothorax.  A chest tube has been 
inserted.

She was noted to be significantly anemic this morning.  Our plan is to give her 
2

units packed RBC with dialysis. 



OBJECTIVE:  VITAL SIGNS:  Blood pressure is 124/53, heart rate 112, O2 
saturations

100%. 

GENERAL:  The patient is sedated and intubated on ventilator support. 

SKIN:  Adequate turgor. 

HEENT:  Pale conjunctivae.  Anicteric sclerae.  No neck mass.  No carotid 
bruits.

No JVD. 

CHEST:  No deformities. 

LUNGS:  Decreased breath sounds. 

HEART:  Normal sinus rhythm.  No murmur.  No gallops.  No rubs. 

ABDOMEN:  Globular, soft, nontender.  No masses. 

EXTREMITIES:  No edema.



MEDICATIONS:  Medications of November 6, 2020, was reviewed.



LABORATORY DATA:  Laboratories of November 6, 2020; white count 11.7, hemoglobin

5.4.  Sodium 136, potassium 3.6, chloride 97, carbon dioxide 28, BUN 93, 
creatinine

1.9, calcium 6.8.  C-reactive protein 10.8. 



ASSESSMENT AND PLAN:  

1. Anemia-we will transfuse 2 units of packed RBC with dialysis.

2. Chronic renal failure/acute kidney injury.  Continue hemodialysis regimen of

every other day, is being done more for fluid removal.  We will attempt between 
2

and 3 L 

of fluid as tolerated by the patient.

3. Bilateral pneumothorax, currently chest tube has been placed.  Surgery is

following. 

4. COVID-19 pneumonia/acute respiratory failure.  Continue supportive care.

Overall, prognosis remains guarded. 







Job ID:  [id number]



[facility]

## 2020-11-06 NOTE — PRG
DATE OF SERVICE:  11/06/2020



SUBJECTIVE:  Ke Sanches remains mechanically ventilated.



OBJECTIVE:  VITAL SIGNS:  Blood pressure 105/58, heart rate is 106, oximetry is

100%. 

LUNGS:  Unchanged. 

HEART:  Unchanged. 

ABDOMEN:  Unchanged.



LABORATORY DATA:  White count is 11.7, hemoglobin 5.4, and platelets 109,000.

Electrolytes are unremarkable.  Creatinine is 1.9. 



PLAN:  She was scheduled to get 2 units of blood with dialysis today. 



We will continue with current supportive care measures.  The hospitalist met with

family today.  She will eventually need a tracheostomy to wean from mechanical

ventilation mainly because of inactivity and basically bed rest for close to a

month. 







Job ID:  [id number]

## 2020-11-07 LAB
ANION GAP SERPL CALC-SCNC: 14 MMOL/L (ref 10–20)
BUN SERPL-MCNC: 70 MG/DL (ref 9.8–20.1)
CALCIUM SERPL-MCNC: 6.6 MG/DL (ref 7.8–10.44)
CHLORIDE SERPL-SCNC: 98 MMOL/L (ref 98–107)
CO2 SERPL-SCNC: 27 MMOL/L (ref 23–31)
CREAT CL PREDICTED SERPL C-G-VRATE: 44 ML/MIN (ref 70–130)
CRP SERPL-MCNC: 9.5 MG/DL
GLUCOSE SERPL-MCNC: 310 MG/DL (ref 80–115)
HGB BLD-MCNC: 7.3 G/DL (ref 12–16)
MCH RBC QN AUTO: 31.4 PG (ref 27–31)
MCV RBC AUTO: 89.3 FL (ref 78–98)
MDIFF COMPLETE?: YES
PLATELET # BLD AUTO: 112 THOU/UL (ref 130–400)
POTASSIUM SERPL-SCNC: 3.8 MMOL/L (ref 3.5–5.1)
RBC # BLD AUTO: 2.32 MILL/UL (ref 4.2–5.4)
SODIUM SERPL-SCNC: 135 MMOL/L (ref 136–145)
WBC # BLD AUTO: 12.8 THOU/UL (ref 4.8–10.8)

## 2020-11-07 RX ADMIN — BRIMONIDINE TARTRATE SCH DROP: 2 SOLUTION OPHTHALMIC at 21:19

## 2020-11-07 RX ADMIN — GUAIFENESIN SCH MG: 200 SOLUTION ORAL at 16:44

## 2020-11-07 RX ADMIN — BACTERIOSTATIC WATER PRN ML: 1 INJECTION, SOLUTION INTRAMUSCULAR; INTRAVENOUS; SUBCUTANEOUS at 20:57

## 2020-11-07 RX ADMIN — INSULIN LISPRO PRN UNIT: 100 INJECTION, SOLUTION INTRAVENOUS; SUBCUTANEOUS at 05:22

## 2020-11-07 RX ADMIN — DORZOLAMIDE HYDROCHLORIDE AND TIMOLOL MALEATE SCH EACH: 22.3; 6.8 SOLUTION/ DROPS OPHTHALMIC at 09:51

## 2020-11-07 RX ADMIN — Medication SCH UNITS: at 09:44

## 2020-11-07 RX ADMIN — INSULIN LISPRO PRN UNIT: 100 INJECTION, SOLUTION INTRAVENOUS; SUBCUTANEOUS at 21:02

## 2020-11-07 RX ADMIN — GUAIFENESIN SCH MG: 200 SOLUTION ORAL at 14:37

## 2020-11-07 RX ADMIN — POLYETHYLENE GLYCOL 400 AND PROPYLENE GLYCOL SCH EACH: 4; 3 SOLUTION/ DROPS OPHTHALMIC at 21:11

## 2020-11-07 RX ADMIN — GUAIFENESIN SCH MG: 200 SOLUTION ORAL at 05:21

## 2020-11-07 RX ADMIN — INSULIN GLARGINE SCH MLS: 100 INJECTION, SOLUTION SUBCUTANEOUS at 20:56

## 2020-11-07 RX ADMIN — GUAIFENESIN SCH MG: 200 SOLUTION ORAL at 20:56

## 2020-11-07 RX ADMIN — INSULIN LISPRO PRN UNIT: 100 INJECTION, SOLUTION INTRAVENOUS; SUBCUTANEOUS at 17:35

## 2020-11-07 RX ADMIN — DORZOLAMIDE HYDROCHLORIDE AND TIMOLOL MALEATE SCH EACH: 22.3; 6.8 SOLUTION/ DROPS OPHTHALMIC at 21:20

## 2020-11-07 RX ADMIN — Medication SCH MG: at 20:55

## 2020-11-07 RX ADMIN — Medication SCH ML: at 20:58

## 2020-11-07 RX ADMIN — POLYETHYLENE GLYCOL 400 AND PROPYLENE GLYCOL SCH EACH: 4; 3 SOLUTION/ DROPS OPHTHALMIC at 09:37

## 2020-11-07 RX ADMIN — GUAIFENESIN SCH MG: 200 SOLUTION ORAL at 10:26

## 2020-11-07 RX ADMIN — GUAIFENESIN SCH MG: 200 SOLUTION ORAL at 00:04

## 2020-11-07 RX ADMIN — DORZOLAMIDE HYDROCHLORIDE AND TIMOLOL MALEATE SCH EACH: 22.3; 6.8 SOLUTION/ DROPS OPHTHALMIC at 14:37

## 2020-11-07 RX ADMIN — Medication SCH ML: at 09:36

## 2020-11-07 RX ADMIN — Medication SCH MG: at 09:33

## 2020-11-07 RX ADMIN — BRIMONIDINE TARTRATE SCH DROP: 2 SOLUTION OPHTHALMIC at 09:34

## 2020-11-07 NOTE — PRG
DATE OF SERVICE:  11/07/2020



SUBJECTIVE:  Ms. Sanches is a 64-year-old  female with chronic renal failure

and initially admitted for COVID-19 pneumonia.  She has gone to acute respiratory

failure.  She has also gone to an acute kidney injury on top of her chronic renal

failure.  She had hyperkalemia and volume overload.  She is currently undergoing

hemodialysis every other day due to volume overload. 



She also developed bilateral pneumothorax.  A chest tube bilaterally has been

inserted.  She did receive 2 units of packed RBCs yesterday with dialysis.  No acute

events noted. 



OBJECTIVE:  VITAL SIGNS:  Blood pressure is 146/70, with a heart rate of 70. 

GENERAL:  The patient is sedated and intubated on ventilator support. 

SKIN:  Adequate turgor. 

HEENT:  Pale conjunctivae.  Anicteric sclerae.  No neck mass.  No carotid bruits.

No JVD. 

CHEST:  No deformities.  Positive for bilateral chest tubes. 

LUNGS:  Decreased breath sounds. 

HEART:  Normal sinus rhythm.  No murmur.  No gallops.  No rubs. 

ABDOMEN:  Globular, soft, nontender.  No masses. 

EXTREMITIES:  Trace edema.



MEDICATIONS:  Of November 7, 2020, were reviewed.



LABORATORY DATA:  Laboratories of November 7, 2020:  White count 12.8, hemoglobin

7.3.  Sodium 135, potassium 3.8, chloride 98, carbon dioxide 27, BUN 70, creatinine

1.82, glucose 310, calcium 6.6.  C-reactive protein is 9.5.  Ferritin 538. 



ASSESSMENT AND PLAN:  

1. Acute kidney injury/chronic renal failure due to the volume overload.  We are

going to continue the every other day hemodialysis with this patient with fluid

removal.  We have left her at the current dose of Lasix 80 mg IV q.12.  There is no

emergent indication for hemodialysis today. 

2. Anemia.  Continue to monitor.  P.r.n. blood transfusion.

3. Acute respiratory failure/COVID-19 pneumonia - for possible tracheostomy

placement in the next few days. 

4. Bilateral pneumothorax - currently with chest tube.  Surgery is following. 



Overall prognosis remains guarded.







Job ID:  [id number]

## 2020-11-07 NOTE — RAD
RADIOGRAPH CHEST 1 VIEW:



DATE:

11/7/2020



TIME:

5:22 AM



HISTORY:

64-year-old female with bilateral pneumothoraces



COMPARISON:

11/5/2020



FINDINGS:

Endotracheal tube and esophagogastric tube remain. Bilateral chest tubes remain with distal tip near 
the apices.

Bilateral small pneumothoraces, right greater than left, are visualized along the periphery of the mi
d and lower lung zones bilaterally, and upper lung zone on the right

, Probably not significant changed.

Dense consolidation of most of the right visualized lung fields remain unchanged.

There is apparently improved aeration of the left lung. Left upper lung zone appears relatively clear
 now. Infiltrates in left mid and lower lung zones may have improved. Focal consolidation at medial

left base remains. Probable bilateral pleural effusions remain.

No cardiomegaly.

Pneumomediastinum remains. Subcutaneous emphysema in the supraclavicular regions.



IMPRESSION:

1) bilateral small pneumothorax, probably unchanged.

2) improved aeration of the left lung.

3) no interval change in dense opacification of most of the right lung and left base.

4) pneumomediastinum.



Reported By: Hardeep Dominguez 

Electronically Signed:  11/7/2020 9:10 AM

## 2020-11-07 NOTE — PRG
DATE OF SERVICE:  11/07/2020



SUBJECTIVE:  Ke Sanches remains mechanically ventilated.  She is still

encephalopathic. 



OBJECTIVE:  VITAL SIGNS:  Heart rate is 114, blood pressure 146/70, respiratory

rates per mechanical ventilation, FiO2 is at 60%. 

LUNGS:  Clear. 

HEART:  Regular rhythm. 

ABDOMEN:  Soft. 

EXTREMITIES:  Without asymmetry.



LABORATORY DATA:  White count 12.8, hemoglobin is up to 7.3 after 2 units yesterday,

platelets 112.  Sodium 135, potassium 3.8, chloride 98, bicarb 27, BUN 70,

creatinine 1.82.  Intake and outputs, positive 556.  Haas output is 314 mL. 



IMPRESSION:  

1. COVID-19 pneumonia.

2. Encephalopathy associated with her COVID pneumonia. 



She never had a loss of cerebral flow or a code or severe hypoxic event.  I suspect

this is all encephalopathy related to COVID.  She will need an EEG and a Neurology

input on Monday.  Her CT of her head did not show any structural abnormalities. 



She continues with renal failure requiring dialysis.  Her hemoglobin and hematocrit

will be tracked and she will be transfused as needed.  She still has fluctuating

blood glucoses as expected. 



Chest radiograph shows bilateral chest tubes with tiny apical pneumothoraces.  We

will continue with supportive care.  Her lung compliance probably is the reason that

she is not completely inflated. 



Critical care time, 30 minutes.







Job ID:  [id number]

## 2020-11-08 LAB
ANION GAP SERPL CALC-SCNC: 16 MMOL/L (ref 10–20)
BUN SERPL-MCNC: 95 MG/DL (ref 9.8–20.1)
CALCIUM SERPL-MCNC: 6.8 MG/DL (ref 7.8–10.44)
CHLORIDE SERPL-SCNC: 98 MMOL/L (ref 98–107)
CO2 SERPL-SCNC: 25 MMOL/L (ref 23–31)
CREAT CL PREDICTED SERPL C-G-VRATE: 32 ML/MIN (ref 70–130)
CRP SERPL-MCNC: 6.64 MG/DL
GLUCOSE SERPL-MCNC: 263 MG/DL (ref 80–115)
GLUCOSE SERPL-MCNC: 336 MG/DL (ref 80–115)
HGB BLD-MCNC: 6.4 G/DL (ref 12–16)
MCH RBC QN AUTO: 31.3 PG (ref 27–31)
MCV RBC AUTO: 89.6 FL (ref 78–98)
MDIFF COMPLETE?: YES
PLATELET # BLD AUTO: 121 THOU/UL (ref 130–400)
POTASSIUM SERPL-SCNC: 3.5 MMOL/L (ref 3.5–5.1)
RBC # BLD AUTO: 2.03 MILL/UL (ref 4.2–5.4)
SODIUM SERPL-SCNC: 135 MMOL/L (ref 136–145)
WBC # BLD AUTO: 10.4 THOU/UL (ref 4.8–10.8)

## 2020-11-08 RX ADMIN — Medication SCH ML: at 17:53

## 2020-11-08 RX ADMIN — BRIMONIDINE TARTRATE SCH DROP: 2 SOLUTION OPHTHALMIC at 21:24

## 2020-11-08 RX ADMIN — GUAIFENESIN SCH MG: 200 SOLUTION ORAL at 01:03

## 2020-11-08 RX ADMIN — INSULIN LISPRO PRN UNIT: 100 INJECTION, SOLUTION INTRAVENOUS; SUBCUTANEOUS at 21:10

## 2020-11-08 RX ADMIN — GUAIFENESIN SCH MG: 200 SOLUTION ORAL at 21:04

## 2020-11-08 RX ADMIN — DORZOLAMIDE HYDROCHLORIDE AND TIMOLOL MALEATE SCH EACH: 22.3; 6.8 SOLUTION/ DROPS OPHTHALMIC at 08:58

## 2020-11-08 RX ADMIN — Medication SCH MG: at 08:56

## 2020-11-08 RX ADMIN — INSULIN LISPRO PRN UNIT: 100 INJECTION, SOLUTION INTRAVENOUS; SUBCUTANEOUS at 19:41

## 2020-11-08 RX ADMIN — GUAIFENESIN SCH MG: 200 SOLUTION ORAL at 05:16

## 2020-11-08 RX ADMIN — Medication SCH MG: at 21:05

## 2020-11-08 RX ADMIN — INSULIN LISPRO PRN UNIT: 100 INJECTION, SOLUTION INTRAVENOUS; SUBCUTANEOUS at 05:24

## 2020-11-08 RX ADMIN — Medication SCH UNITS: at 09:04

## 2020-11-08 RX ADMIN — DORZOLAMIDE HYDROCHLORIDE AND TIMOLOL MALEATE SCH EACH: 22.3; 6.8 SOLUTION/ DROPS OPHTHALMIC at 16:14

## 2020-11-08 RX ADMIN — BACTERIOSTATIC WATER PRN ML: 1 INJECTION, SOLUTION INTRAMUSCULAR; INTRAVENOUS; SUBCUTANEOUS at 21:05

## 2020-11-08 RX ADMIN — GUAIFENESIN SCH MG: 200 SOLUTION ORAL at 08:59

## 2020-11-08 RX ADMIN — POLYETHYLENE GLYCOL 400 AND PROPYLENE GLYCOL SCH EACH: 4; 3 SOLUTION/ DROPS OPHTHALMIC at 09:04

## 2020-11-08 RX ADMIN — POLYETHYLENE GLYCOL 400 AND PROPYLENE GLYCOL SCH EACH: 4; 3 SOLUTION/ DROPS OPHTHALMIC at 21:23

## 2020-11-08 RX ADMIN — DORZOLAMIDE HYDROCHLORIDE AND TIMOLOL MALEATE SCH EACH: 22.3; 6.8 SOLUTION/ DROPS OPHTHALMIC at 21:26

## 2020-11-08 RX ADMIN — BRIMONIDINE TARTRATE SCH DROP: 2 SOLUTION OPHTHALMIC at 08:58

## 2020-11-08 RX ADMIN — INSULIN GLARGINE SCH MLS: 100 INJECTION, SOLUTION SUBCUTANEOUS at 21:07

## 2020-11-08 NOTE — PRG
DATE OF SERVICE:  11/08/2020



SUBJECTIVE:  Ms. Sanches is a 64-year-old  female, who was admitted for

COVID-19 pneumonia, acute respiratory failure, and being followed by the Renal

Service for her acute kidney injury on top of her chronic renal failure.  She 
has

been initiated on dialysis for volume overload initially for hyperkalemia.  She 
is

currently undergoing hemodialysis today.  We will attempt fluid removal of about
3 L

if this patient will tolerate it.  In addition, because of her anemia, she will

receive 2 units of packed RBC with dialysis. 



Please note that in the last couple of days, the patient developed pneumothorax 
and

a chest tube has been inserted. 



OBJECTIVE:  VITAL SIGNS:  Blood pressure 198/75, heart rate 106, respiratory 
rate

29, and O2 saturation 99%. 

GENERAL:  The patient is sedated, intubated on ventilator support. 

SKIN:  Adequate turgor. 

HEENT:  Pale conjunctivae.  Anicteric sclerae. 

NECK:  No neck mass.  No carotid bruits.  No JVD. 

CHEST:  No deformities. 

LUNGS:  Decreased breath sounds. 

HEART:  Normal sinus rhythm.  No murmur.  No gallops.  No rubs.  Positive for

bilateral chest tubes. 

ABDOMEN:  Globular, soft, nontender. 

EXTREMITIES:  No edema.  No deformities.



MEDICATIONS:  Medications of November 8, 2020, were reviewed.



LABORATORY DATA:  Laboratories of November 8, 2020; white count 10.4, hemoglobin

6.4.  Sodium 135, potassium 3.5, chloride 98, carbon dioxide 25, BUN 95, 
creatinine

2.37, glucose 236, calcium 6.8.  C-reactive protein is 6.64, ferritin 426. 



ASSESSMENT AND PLAN:  

1. Acute kidney injury/chronic renal failure.  Continuing every other day 
dialysis

for fluid removal.  She is currently undergoing hemodialysis.  We will attempt 3
L

fluid removal as tolerated by the patient.  I have reviewed her urine output.  I
do

not feel that she is not responding to the Lasix and for this reason, we will 
discontinue

the Lasix. 

2. Anemia.  We will transfuse 2 units of packed RBC with dialysis.

3. COVID-19 pneumonia/acute respiratory failure.  Continue supportive care.  I 
have

mentioned to the daughter the possibility of future tracheostomy with this 
patient. 







Job ID:  [id number]



MTDD

## 2020-11-08 NOTE — PRG
DATE OF SERVICE:  11/08/2020



SUBJECTIVE:  Ms. Sanches unfortunately is still encephalopathic.  She is not

awakening.  She had to be paralyzed during hemodialysis with dyssynchronous

respiratory pattern during mechanical ventilation. 



OBJECTIVE:  LUNGS:  Clear anteriorly. 

HEART: Regular rhythm. 

ABDOMEN: Soft.



LABORATORY DATA:  Her hemodynamics are stable. 



Hemoglobin 6.4 this morning, it was 5.4 two days ago.  She received 2 units. She is

receiving two more units today. 



ASSESSMENT AND PLAN:  Unfortunately, she is most likely oozing blood somewhere given

the significant drop in a 2-day period. 



She will be transfused.  She is still requiring dialysis. 



I had a long talk with her daughter.  We discussed end-of-life issues.  I support

continued aggressive care and even support a tracheostomy and ongoing dialysis, but

I did not think she should support chest compressions and CPR should she have a

cardiac arrest.  The daughter agreed. 



Unfortunately, Ms. Sanches's sister is in the exact same situation, but already has a

trach on the other side of the ICU.  She is also still encephalopathic. 



Ms. Sanches did not arrest, so I suspect she has an encephalitis associated with this

virus. 



Will have to hold the anticoagulants with the dropping hemoglobin for now. 



We will continue with steroids, supportive care.  I have placed a consult in the

morning for surgeon to get her on the schedule this week for tracheostomy.

Unfortunately, prognosis is quite poor.  We will plan for an EEG in the morning.  We

might consider repeating a head CT to make sure there is not an evolving infarct,

although it seems unlikely.  I do not feel that going down for an MRI at this point

in time is quiñones given her requirements for ventilation and aggressive support.  She

is now a do-not-resuscitate patient per my discussion with the daughter. 







Job ID:  [id number]

## 2020-11-09 LAB
ANION GAP SERPL CALC-SCNC: 15 MMOL/L (ref 10–20)
BUN SERPL-MCNC: 63 MG/DL (ref 9.8–20.1)
CALCIUM SERPL-MCNC: 7 MG/DL (ref 7.8–10.44)
CHLORIDE SERPL-SCNC: 103 MMOL/L (ref 98–107)
CO2 SERPL-SCNC: 26 MMOL/L (ref 23–31)
CREAT CL PREDICTED SERPL C-G-VRATE: 39 ML/MIN (ref 70–130)
CRP SERPL-MCNC: 5.56 MG/DL
GLUCOSE SERPL-MCNC: 251 MG/DL (ref 80–115)
HGB BLD-MCNC: 10 G/DL (ref 12–16)
MCH RBC QN AUTO: 31.7 PG (ref 27–31)
MCV RBC AUTO: 88.2 FL (ref 78–98)
MDIFF COMPLETE?: YES
PLATELET # BLD AUTO: 116 THOU/UL (ref 130–400)
POTASSIUM SERPL-SCNC: 3.3 MMOL/L (ref 3.5–5.1)
RBC # BLD AUTO: 3.15 MILL/UL (ref 4.2–5.4)
SODIUM SERPL-SCNC: 141 MMOL/L (ref 136–145)
WBC # BLD AUTO: 10 THOU/UL (ref 4.8–10.8)

## 2020-11-09 RX ADMIN — GUAIFENESIN SCH MG: 200 SOLUTION ORAL at 21:20

## 2020-11-09 RX ADMIN — GUAIFENESIN SCH MG: 200 SOLUTION ORAL at 17:35

## 2020-11-09 RX ADMIN — GUAIFENESIN SCH MG: 200 SOLUTION ORAL at 12:39

## 2020-11-09 RX ADMIN — INSULIN LISPRO PRN UNIT: 100 INJECTION, SOLUTION INTRAVENOUS; SUBCUTANEOUS at 05:09

## 2020-11-09 RX ADMIN — Medication SCH ML: at 00:19

## 2020-11-09 RX ADMIN — Medication SCH: at 09:12

## 2020-11-09 RX ADMIN — NICARDIPINE HYDROCHLORIDE SCH MLS: 25 INJECTION INTRAVENOUS at 01:18

## 2020-11-09 RX ADMIN — POLYETHYLENE GLYCOL 400 AND PROPYLENE GLYCOL SCH EACH: 4; 3 SOLUTION/ DROPS OPHTHALMIC at 09:27

## 2020-11-09 RX ADMIN — GUAIFENESIN SCH MG: 200 SOLUTION ORAL at 05:10

## 2020-11-09 RX ADMIN — GUAIFENESIN SCH MG: 200 SOLUTION ORAL at 09:06

## 2020-11-09 RX ADMIN — Medication SCH MG: at 09:08

## 2020-11-09 RX ADMIN — DORZOLAMIDE HYDROCHLORIDE AND TIMOLOL MALEATE SCH EACH: 22.3; 6.8 SOLUTION/ DROPS OPHTHALMIC at 21:41

## 2020-11-09 RX ADMIN — Medication SCH UNITS: at 09:20

## 2020-11-09 RX ADMIN — BRIMONIDINE TARTRATE SCH DROP: 2 SOLUTION OPHTHALMIC at 09:22

## 2020-11-09 RX ADMIN — DORZOLAMIDE HYDROCHLORIDE AND TIMOLOL MALEATE SCH EACH: 22.3; 6.8 SOLUTION/ DROPS OPHTHALMIC at 14:50

## 2020-11-09 RX ADMIN — INSULIN LISPRO PRN UNIT: 100 INJECTION, SOLUTION INTRAVENOUS; SUBCUTANEOUS at 21:32

## 2020-11-09 RX ADMIN — GUAIFENESIN SCH: 200 SOLUTION ORAL at 00:19

## 2020-11-09 RX ADMIN — Medication SCH ML: at 21:22

## 2020-11-09 RX ADMIN — DORZOLAMIDE HYDROCHLORIDE AND TIMOLOL MALEATE SCH EACH: 22.3; 6.8 SOLUTION/ DROPS OPHTHALMIC at 09:25

## 2020-11-09 RX ADMIN — BRIMONIDINE TARTRATE SCH DROP: 2 SOLUTION OPHTHALMIC at 21:41

## 2020-11-09 RX ADMIN — GUAIFENESIN SCH MG: 200 SOLUTION ORAL at 00:52

## 2020-11-09 RX ADMIN — POLYETHYLENE GLYCOL 400 AND PROPYLENE GLYCOL SCH EACH: 4; 3 SOLUTION/ DROPS OPHTHALMIC at 21:41

## 2020-11-09 RX ADMIN — INSULIN GLARGINE SCH MLS: 100 INJECTION, SOLUTION SUBCUTANEOUS at 21:21

## 2020-11-09 RX ADMIN — Medication SCH MG: at 21:20

## 2020-11-09 RX ADMIN — NICARDIPINE HYDROCHLORIDE SCH MLS: 25 INJECTION INTRAVENOUS at 19:37

## 2020-11-09 RX ADMIN — INSULIN LISPRO PRN UNIT: 100 INJECTION, SOLUTION INTRAVENOUS; SUBCUTANEOUS at 17:43

## 2020-11-09 RX ADMIN — NICARDIPINE HYDROCHLORIDE SCH MLS: 25 INJECTION INTRAVENOUS at 11:18

## 2020-11-09 RX ADMIN — SCOPALAMINE SCH MG: 1 PATCH, EXTENDED RELEASE TRANSDERMAL at 11:10

## 2020-11-09 NOTE — CON
DATE OF CONSULTATION:  



HISTORY OF PRESENT ILLNESS:  Ke Sanches is a 64-year-old female,

recovering COVID pneumonia, 31 BMI, 5 feet 3, 175 pounds, who has developed acute

renal failure and needs cuffed tunneled dialysis catheter as well as a tracheostomy

and PEG tube.  She is followed by Dr. Rascon and Dr. Harsh Mcnamara.  The patient was

admitted on 10/12/2020, hospitalist service seen by Dr. Whitney, Dr. Rascon, Dr. Mcnamara.

She has history of CHF, diabetes, hypertension, chronic back pain, hypothyroidism,

presented with COVID illness, respiratory failure, ventilator dependent, now off

isolation.  She is required hemodialysis catheter to help diurese fluids.  I have

talked to Dr. Harsh Mcnamara and feels that she will need a cuffed tunneled dialysis

catheter and we will plan tracheostomy, PEG tube, cuffed tunneled dialysis catheter. 



OUTPATIENT MEDICATIONS:  

1. Amlodipine.

2. Lasix.

3. Glipizide.

4. Levothyroxine.

5. Lisinopril.

6. Lovastatin.

7. Metoprolol.

8. Spironolactone.

9. Cefdinir.



ALLERGIES:  NONE.



PAST SURGICAL HISTORY:  Cholecystectomy.



PAST MEDICAL HISTORY:  Hypertension, diabetes, obesity.



SOCIAL HISTORY:  Tobacco, never.  Alcohol, occasionally.



PHYSICAL EXAMINATION:

VITAL SIGNS:  The patient on the ventilator, she is sedated.  5 feet 3, 175 pounds,

31 BMI.  98.7, 184/61. 

LUNGS:  Clear to auscultation.  Rhonchi.  No wheezing. 

CARDIAC:  Regular rate and rhythm. 

ABDOMEN:  Soft, obese. 

EXTREMITIES:  Unremarkable.



ASSESSMENT AND PLAN:  

1. Respiratory failure, COVID pneumonia.  We will plan tracheostomy, PEG tube.

2. Acute renal failure.  We will need a cuffed tunneled dialysis catheter.  We will

place that on the same anesthetic.  We will plan that Friday. 







Job ID:  [id number]

## 2020-11-09 NOTE — PRG
DATE OF SERVICE:  11/09/2020



SUBJECTIVE:  Ms. Sanches remains clinically stable.  She is blinking intermittently.



OBJECTIVE:  VITAL SIGNS:  Blood pressure 173/53, heart rate 72, respiratory rates in

the 20s. 

LUNGS:  Clear. 

HEART:  Regular rhythm. 

ABDOMEN:  Soft.



LABORATORY DATA:  White count 10, hemoglobin 10, platelets 116,000.  Sodium 141,

potassium 3.3, chloride 103, bicarb 26, BUN 63, creatinine 1.91.  Intake and output

positive 1159, urine output was 516 mL. 



IMPRESSION:  COVID pneumonia with respiratory failure and encephalopathy secondary

to COVID most likely.  At some point time this week, she needs a trach and a feeding

tube.  Prognosis is quite guarded.  We will continue aggressive care.  [place] CPR

per my discussion with family.  She will have an EEG today.  Neurology input. 







Job ID:  [id number]

## 2020-11-09 NOTE — RAD
PORTABLE CHEST:

 

HISTORY: 

Respiratory distress.

 

COMPARISON: 

11/7/2020 exam.

 

FINDINGS: 

Endotracheal and NG tubes remain in satisfactory position.  Bilateral chest tubes are unchanged in po
sition.  Right-sided pneumothorax is probably stable as compared to the prior exam.  It is more diffi
cult to appreciate any definite left-sided pneumothorax.  Fairly extensive consolidation of the right
 lung actually slightly improved.  This may be related to some better expansion of the right lung sin
ce the previous exam.  Interstitial alveolar lung changes to the left lung are slightly worsened.

 

IMPRESSION: 

Some improvement to the right-sided pneumothorax.  Bilateral chest tubes remain unchanged in position
.  Suggestion of some slight worsening to some of the interstitial alveolar changes in the left lung 
with improvement to consolidation in the right lung.

 

POS: OFF

## 2020-11-09 NOTE — PDOC.EEG
Neurology EEG Report





- Report


Report: 





 


This EEG was performed using 24 channel InforSense video digital EEG machine with 24 

disc electrodes.    This was an extended 2 hours 4 minutes of EEG recording. 

Digital analysis of the EEG was done for [place] and seizure detection which 

revealed no abnormalities.





Background:


The posterior background rhythm  is  not observed.





Photic stimulation:


No response seen with photic stimulation.





Hyperventilation:


Not performed.





Sleep:


None.





EEG diagnosis:


Intermitent irregular theta activity seen throughout the recording.


Absence of posterior background rhythm.





Clinical interpretation:


This EEG is consistent with  moderate generalized nonspecific cerebral 

dysfunction.

## 2020-11-09 NOTE — PRG
DATE OF SERVICE:  11/09/2020



SERVICE:  Renal Medicine.



SUBJECTIVE:  Ms. Sanches is a 64-year-old  female, who was admitted for

COVID-19 pneumonia and currently in acute respiratory failure.  We are following her

up for her worsening renal dysfunction.  Due to volume overload and initial

hyperkalemia, we have initiated dialysis. 



Due to the improved potassium, Kayexalate will be discontinued as well as the

lactulose.  No other acute events noted.  She still remains unresponsive.  An EEG

has been ordered, and a possible repeat brain imaging has been considered, planned

tracheostomy also this week. 



No acute events noted last night.



OBJECTIVE:  VITAL SIGNS:  Blood pressure 156/57, heart rate 80, respiratory rate 30,

O2 saturation 99%. 

GENERAL:  The patient is unresponsive, intubated on ventilator support. 

SKIN:  Adequate turgor. 

HEENT:  She has slightly pale conjunctivae.  Anicteric sclerae.  No neck mass.  No

carotid bruits.  No JVD. 

CHEST:  Positive for bilateral chest tubes. 

LUNGS:  Decreased breath sounds. 

HEART:  Normal sinus rhythm.  No murmurs.  No gallops.  No rubs. 

ABDOMEN:  Globular, soft, and nontender.  No masses. 

EXTREMITIES:  No edema.  No deformities.



MEDICATIONS:  November 9, 2020, were reviewed.



LABORATORY DATA:  November 9, 2020; white count 10, hemoglobin 10.  Sodium 141,

potassium 3.3, chloride 103, carbon dioxide 26, BUN 63, creatinine 1.91, glucose

251, calcium 7.  C-reactive protein is 5.56.  Ferritin 14. 



ASSESSMENT AND PLAN:  

1. Chronic renal failure - hemodialysis has been initiated due to volume overload.

The patient underwent hemodialysis yesterday, and we were able to pull off 2.7 L.

No indication for an emergent hemodialysis today.  We will again resume back every

other day dialysis in a.m.  Again, fluid removal as tolerated by the patient. 

2. Anemia.  PRN blood transfusion.

3. COVID-19 pneumonia.  Continue supportive care.  The patient is off isolation.

The family has agreed to plan PEG tube and tracheostomy. 



Overall prognosis remains guarded.







Job ID:  [id number]

## 2020-11-10 LAB
ANALYZER IN CARDIO: (no result)
ANION GAP SERPL CALC-SCNC: 16 MMOL/L (ref 10–20)
BASE EXCESS STD BLDA CALC-SCNC: -0.9 MEQ/L
BUN SERPL-MCNC: 83 MG/DL (ref 9.8–20.1)
CA-I BLDA-SCNC: 1.06 MMOL/L (ref 1.12–1.3)
CALCIUM SERPL-MCNC: 6.8 MG/DL (ref 7.8–10.44)
CHLORIDE SERPL-SCNC: 104 MMOL/L (ref 98–107)
CO2 SERPL-SCNC: 22 MMOL/L (ref 23–31)
CREAT CL PREDICTED SERPL C-G-VRATE: 33 ML/MIN (ref 70–130)
CRP SERPL-MCNC: 6.42 MG/DL
GLUCOSE SERPL-MCNC: 267 MG/DL (ref 80–115)
HCO3 BLDA-SCNC: 23.7 MEQ/L (ref 22–28)
HCT VFR BLDA CALC: 32 % (ref 36–47)
HGB BLD-MCNC: 9.2 G/DL (ref 12–16)
HGB BLDA-MCNC: 10.8 G/DL (ref 12–16)
MCH RBC QN AUTO: 32.2 PG (ref 27–31)
MCV RBC AUTO: 90.3 FL (ref 78–98)
MDIFF COMPLETE?: YES
PCO2 BLDA: 39.4 MMHG (ref 35–45)
PH BLDA: 7.4 [PH] (ref 7.35–7.45)
PLATELET # BLD AUTO: 88 THOU/UL (ref 130–400)
PO2 BLDA: 49.5 MMHG (ref 80–?)
POTASSIUM BLD-SCNC: 3.48 MMOL/L (ref 3.7–5.3)
POTASSIUM SERPL-SCNC: 3.3 MMOL/L (ref 3.5–5.1)
RBC # BLD AUTO: 2.85 MILL/UL (ref 4.2–5.4)
SODIUM SERPL-SCNC: 139 MMOL/L (ref 136–145)
SPECIMEN DRAWN FROM PATIENT: (no result)
WBC # BLD AUTO: 10.2 THOU/UL (ref 4.8–10.8)

## 2020-11-10 RX ADMIN — GUAIFENESIN SCH MG: 200 SOLUTION ORAL at 21:03

## 2020-11-10 RX ADMIN — GUAIFENESIN SCH MG: 200 SOLUTION ORAL at 06:09

## 2020-11-10 RX ADMIN — INSULIN GLARGINE SCH MLS: 100 INJECTION, SOLUTION SUBCUTANEOUS at 20:54

## 2020-11-10 RX ADMIN — POLYETHYLENE GLYCOL 400 AND PROPYLENE GLYCOL SCH EACH: 4; 3 SOLUTION/ DROPS OPHTHALMIC at 20:57

## 2020-11-10 RX ADMIN — DORZOLAMIDE HYDROCHLORIDE AND TIMOLOL MALEATE SCH EACH: 22.3; 6.8 SOLUTION/ DROPS OPHTHALMIC at 09:18

## 2020-11-10 RX ADMIN — INSULIN LISPRO PRN UNIT: 100 INJECTION, SOLUTION INTRAVENOUS; SUBCUTANEOUS at 16:16

## 2020-11-10 RX ADMIN — GUAIFENESIN SCH MG: 200 SOLUTION ORAL at 16:15

## 2020-11-10 RX ADMIN — POLYETHYLENE GLYCOL 400 AND PROPYLENE GLYCOL SCH EACH: 4; 3 SOLUTION/ DROPS OPHTHALMIC at 09:20

## 2020-11-10 RX ADMIN — INSULIN LISPRO PRN UNIT: 100 INJECTION, SOLUTION INTRAVENOUS; SUBCUTANEOUS at 04:04

## 2020-11-10 RX ADMIN — DORZOLAMIDE HYDROCHLORIDE AND TIMOLOL MALEATE SCH EACH: 22.3; 6.8 SOLUTION/ DROPS OPHTHALMIC at 16:14

## 2020-11-10 RX ADMIN — BRIMONIDINE TARTRATE SCH DROP: 2 SOLUTION OPHTHALMIC at 09:19

## 2020-11-10 RX ADMIN — Medication SCH ML: at 09:18

## 2020-11-10 RX ADMIN — INSULIN LISPRO PRN UNIT: 100 INJECTION, SOLUTION INTRAVENOUS; SUBCUTANEOUS at 21:03

## 2020-11-10 RX ADMIN — Medication SCH ML: at 20:58

## 2020-11-10 RX ADMIN — Medication SCH MG: at 20:55

## 2020-11-10 RX ADMIN — Medication SCH UNITS: at 09:11

## 2020-11-10 RX ADMIN — Medication SCH MG: at 09:12

## 2020-11-10 RX ADMIN — GUAIFENESIN SCH MG: 200 SOLUTION ORAL at 01:01

## 2020-11-10 RX ADMIN — GUAIFENESIN SCH MG: 200 SOLUTION ORAL at 12:58

## 2020-11-10 RX ADMIN — BACTERIOSTATIC WATER PRN ML: 1 INJECTION, SOLUTION INTRAMUSCULAR; INTRAVENOUS; SUBCUTANEOUS at 09:48

## 2020-11-10 RX ADMIN — GUAIFENESIN SCH MG: 200 SOLUTION ORAL at 09:23

## 2020-11-10 RX ADMIN — NICARDIPINE HYDROCHLORIDE SCH MLS: 25 INJECTION INTRAVENOUS at 22:19

## 2020-11-10 RX ADMIN — NICARDIPINE HYDROCHLORIDE SCH MLS: 25 INJECTION INTRAVENOUS at 02:30

## 2020-11-10 RX ADMIN — DORZOLAMIDE HYDROCHLORIDE AND TIMOLOL MALEATE SCH EACH: 22.3; 6.8 SOLUTION/ DROPS OPHTHALMIC at 20:57

## 2020-11-10 RX ADMIN — BRIMONIDINE TARTRATE SCH DROP: 2 SOLUTION OPHTHALMIC at 20:57

## 2020-11-10 NOTE — PRG
DATE OF SERVICE:  11/10/2020



SUBJECTIVE:  Ke Sanches was doing reasonably well this morning, although she

is still no more alert than she was yesterday. 



OBJECTIVE:  VITAL SIGNS:  Pulse is 100, respiratory rate is per mechanical

ventilation, and blood pressure 132/52. 

LUNGS:  Remarkable for equal breath sounds.  She has subcu air. 

HEART:  Regular rhythm. 

ABDOMEN:  Soft.



DIAGNOSTIC STUDIES:  Chest radiograph done late this morning still shows a small

pneumothorax and lots of subcu air. 



We had switched her back to pressure control ventilation today. 



I had a long discussion with the son and the daughter.  They want to proceed with

PEG if it can be done later this week, but they also want to set care boundaries.

They do not want to do this forever.  I have explained that a trach and a PEG give

her 5 to 7 more days.  There is no progress and it would not be unreasonable to

withdraw care. 



LABORATORY DATA:  Reviewed.  White count 7.2, hemoglobin 9.2, and platelets 88,000.

Electrolytes are unremarkable.  BUN 83 and creatinine 2.21.  Urine output was 710

mL, intake 1969 and output 1210. 



IMPRESSION:  

1. Respiratory failure associated with COVID-19.

2. Renal failure associated with COVID-19.

3. Obesity.

4. Bilateral pneumothoraces with chest tubes in place.

5. 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

6. Encephalopathy, most likely secondary to COVID-19.  Her prognosis overall is

quite poor at this time. 







Job ID:  [id number]

## 2020-11-10 NOTE — PRG
DATE OF SERVICE:  11/10/2020



SUBJECTIVE:  Ms. Sanches is a 64-year-old  female, who was initially admitted

for COVID-19 pneumonia and has gone to acute respiratory failure.  She also has gone

to acute kidney injury and currently being dialyzed due to volume overload as well

as an initial hyperkalemia.  We are currently doing her for 3 hours today.  We will

attempt to max out fluid removal at 3 L as tolerated.  The patient is not currently

weanable from her ventilator.  The plan is to have a tracheostomy, a cuffed

hemodialysis catheter placed and a PEG tube done this Friday with Dr. Gaitan.  No

acute events noted last night. 



OBJECTIVE:  VITAL SIGNS:  Blood pressure 116/53, heart rate is 81, and O2 saturation

98%. 

GENERAL:  The patient is unresponsive, intubated on ventilator support. 

SKIN:  Adequate turgor. 

HEENT:  Slightly pale conjunctivae.  Anicteric sclerae. 

NECK:  No neck mass.  No carotid bruits.  No JVD. 

CHEST:  No deformities. 

LUNGS:  Decreased breath sounds. 

HEART:  Normal sinus rhythm.  No murmur.  No gallops.  No rubs. 

ABDOMEN:  Globular, soft, and nontender.  No masses. 

EXTREMITIES:  Trace edema.



MEDICATIONS:  Medications of November 10, 2020, reviewed.



LABORATORY DATA:  Laboratories of November 10, 2020, showed white count 10.2 and

hemoglobin 9.2.  Sodium 139, potassium 3.3, chloride 104, carbon dioxide 22, BUN 83,

creatinine 2.21, glucose 267, and calcium 6.8.  C-reactive protein 6.42. 



ASSESSMENT AND PLAN:  

1. COVID-19 pneumonia/acute respiratory failure - continue supportive care.  Unable

to wean off from the vent.  Plan tracheostomy this coming Friday. 

2. Anemia - p.r.n. blood transfusion.

3. Acute kidney injury/chronic renal failure, currently on maintenance hemodialysis

of every other day for fluid removal.  Again challenging the patient for 3 L fluid

removal as tolerated. 



Overall prognosis remains guarded.  Please note, an EEG was done, per reading by Dr. Mccann showed the EEG was consistent with moderate generalized nonspecific cerebral

dysfunction. 







Job ID:  [id number]

## 2020-11-10 NOTE — RAD
XR Chest 1 View Portable



History: Respiratory failure



Comparison: Radiograph prior day



Findings: Endotracheal tube tip at the clavicular level. Thoracostomy tubes are in place with tip juliano
r the lung apices.



Consolidation within the right upper lobe is slightly improved. Left lung consolidation is similar.



Small volume pneumomediastinum extending into the neck. Enteric tube tip in gastric body.



Impression: Overall slight improved aeration right upper lobe. Remainder of the findings are similar.




Reported By: Mauricio Munoz 

Electronically Signed:  11/10/2020 12:55 PM

## 2020-11-11 LAB
ANION GAP SERPL CALC-SCNC: 14 MMOL/L (ref 10–20)
BUN SERPL-MCNC: 71 MG/DL (ref 9.8–20.1)
CALCIUM SERPL-MCNC: 6.9 MG/DL (ref 7.8–10.44)
CHLORIDE SERPL-SCNC: 102 MMOL/L (ref 98–107)
CO2 SERPL-SCNC: 24 MMOL/L (ref 23–31)
CREAT CL PREDICTED SERPL C-G-VRATE: 40 ML/MIN (ref 70–130)
CRP SERPL-MCNC: 3.91 MG/DL
GLUCOSE SERPL-MCNC: 337 MG/DL (ref 80–115)
HGB BLD-MCNC: 8.8 G/DL (ref 12–16)
MCH RBC QN AUTO: 31.9 PG (ref 27–31)
MCV RBC AUTO: 91.7 FL (ref 78–98)
MDIFF COMPLETE?: YES
PLATELET # BLD AUTO: 116 THOU/UL (ref 130–400)
POTASSIUM SERPL-SCNC: 3.4 MMOL/L (ref 3.5–5.1)
RBC # BLD AUTO: 2.76 MILL/UL (ref 4.2–5.4)
SODIUM SERPL-SCNC: 137 MMOL/L (ref 136–145)
WBC # BLD AUTO: 11.1 THOU/UL (ref 4.8–10.8)

## 2020-11-11 RX ADMIN — INSULIN LISPRO PRN UNIT: 100 INJECTION, SOLUTION INTRAVENOUS; SUBCUTANEOUS at 17:12

## 2020-11-11 RX ADMIN — DORZOLAMIDE HYDROCHLORIDE AND TIMOLOL MALEATE SCH EACH: 22.3; 6.8 SOLUTION/ DROPS OPHTHALMIC at 08:46

## 2020-11-11 RX ADMIN — INSULIN GLARGINE SCH: 100 INJECTION, SOLUTION SUBCUTANEOUS at 20:42

## 2020-11-11 RX ADMIN — BRIMONIDINE TARTRATE SCH DROP: 2 SOLUTION OPHTHALMIC at 20:41

## 2020-11-11 RX ADMIN — GUAIFENESIN SCH MG: 200 SOLUTION ORAL at 16:59

## 2020-11-11 RX ADMIN — Medication SCH MG: at 20:39

## 2020-11-11 RX ADMIN — INSULIN LISPRO PRN UNIT: 100 INJECTION, SOLUTION INTRAVENOUS; SUBCUTANEOUS at 04:56

## 2020-11-11 RX ADMIN — GUAIFENESIN SCH MG: 200 SOLUTION ORAL at 04:55

## 2020-11-11 RX ADMIN — INSULIN LISPRO PRN UNIT: 100 INJECTION, SOLUTION INTRAVENOUS; SUBCUTANEOUS at 12:27

## 2020-11-11 RX ADMIN — DORZOLAMIDE HYDROCHLORIDE AND TIMOLOL MALEATE SCH EACH: 22.3; 6.8 SOLUTION/ DROPS OPHTHALMIC at 20:41

## 2020-11-11 RX ADMIN — BRIMONIDINE TARTRATE SCH DROP: 2 SOLUTION OPHTHALMIC at 08:45

## 2020-11-11 RX ADMIN — Medication SCH ML: at 08:47

## 2020-11-11 RX ADMIN — DORZOLAMIDE HYDROCHLORIDE AND TIMOLOL MALEATE SCH EACH: 22.3; 6.8 SOLUTION/ DROPS OPHTHALMIC at 14:59

## 2020-11-11 RX ADMIN — Medication SCH MG: at 08:39

## 2020-11-11 RX ADMIN — Medication SCH UNITS: at 08:52

## 2020-11-11 RX ADMIN — Medication SCH ML: at 20:40

## 2020-11-11 RX ADMIN — POLYETHYLENE GLYCOL 400 AND PROPYLENE GLYCOL SCH EACH: 4; 3 SOLUTION/ DROPS OPHTHALMIC at 20:42

## 2020-11-11 RX ADMIN — POLYETHYLENE GLYCOL 400 AND PROPYLENE GLYCOL SCH EACH: 4; 3 SOLUTION/ DROPS OPHTHALMIC at 08:46

## 2020-11-11 RX ADMIN — GUAIFENESIN SCH MG: 200 SOLUTION ORAL at 20:39

## 2020-11-11 RX ADMIN — GUAIFENESIN SCH MG: 200 SOLUTION ORAL at 13:08

## 2020-11-11 RX ADMIN — GUAIFENESIN SCH: 200 SOLUTION ORAL at 09:16

## 2020-11-11 RX ADMIN — GUAIFENESIN SCH MG: 200 SOLUTION ORAL at 00:26

## 2020-11-11 NOTE — PRG
DATE OF SERVICE:  11/11/2020



SUBJECTIVE:  Ms. Sanches is a 64-year-old  female, who was admitted for

COVID-19 pneumonia and has developed acute respiratory failure, currently on 
ventilator

support.  She has developed progressive azotemia and volume overload.  For that

reason, she is on maintenance hemodialysis.  Currently, she is on scheduled 
every

other day hemodialysis for fluid removal.  No acute events noted last night.  
There

is a plan to have a tracheostomy, a cuffed hemodialysis catheter and AV fistula 
this

coming Friday. 



OBJECTIVE:  VITAL SIGNS:  Blood pressure 190/59 with a heart rate of 83. 

GENERAL:  The patient is unresponsive, intubated on ventilator support. 

SKIN:  Adequate turgor. 

HEENT:  Slightly pale conjunctivae.  Anicteric sclerae.  No neck mass.  No 
carotid

bruits.  No JVD. 

CHEST:  No deformities. 

LUNGS:  Decreased breath sounds. 

HEART:  Normal sinus rhythm.  No murmur.  No gallops.  No rubs. 

ABDOMEN:  Globular, soft, and nontender. 

EXTREMITIES:  Trace edema.



MEDICATIONS:  Medications of November 11, 2020, was reviewed.



LABORATORY DATA:  Laboratories of November 11, 2020; white count 11.1 and 
hemoglobin

8.8.  Sodium 137, potassium 3.4, chloride 102.  Carbon dioxide 24, BUN 71,

creatinine 1.83, and calcium 6.9.  C-reactive protein 3.91. 



ASSESSMENT AND PLAN:  

1. Mild hypocalcemia.  We will adjust calcium bath with dialysis tomorrow.

2. Mild hypokalemia.  We will simply observe this.

3. Acute kidney injury/chronic renal failure.  Continue every other day

hemodialysis. 

No indication for any emergent hemodialysis today.  Fluid removal as tolerated. 
We

would remove about 3 L fluid yesterday. 

4. Acute respiratory failure, COVID-19, pneumonia.  Continuing supportive care.

Please note, patient is off isolation.  Overall, prognosis remains guarded. 







Job ID:  [id number]



[facility]

## 2020-11-11 NOTE — PRG
DATE OF SERVICE:  11/11/2020



SUBJECTIVE:  Ms. Sanches remains mechanically ventilated.  Plan is to tentatively

perform tracheostomy on Friday. 



Heart rate is in 70s, blood pressure is a little better this afternoon, respiratory

rate per mechanical ventilation.  Ventilator has been adjusted today.  We will

continue to try to make daily ventilator adjustments. 

Intake and output was -218. 



She has not been weighed accurately in my opinion, but her weight is reported at 175.



OBJECTIVE:  LUNGS:  Remarkable for equal breath sounds. 

HEART:  Regular rhythm. 

ABDOMEN:  Soft.



LABORATORY DATA:  White count 11.1, hemoglobin 8.8, platelets 116.  Sodium 137,

potassium 3.4, chloride 102, bicarb 24, BUN 71, creatinine 1.83, glucose 337. 



IMPRESSION:  

1. COVID pneumonia.  

2. Renal failure.

3. Diabetes.

4. Obesity.

5. Deconditioning.

6. Persistent encephalopathy without seizures on EEG. 

We need to continue to aggressively dialyze her down as much as her blood pressure

will tolerate or she will not wean.  It is unclear how much of her pulmonary

parenchymal compliance issues are related to pulmonary edema and how much are

related to the pneumonia. 



I suspect her encephalopathy will gradually improve, but very slowly, but it is

unclear what point she will plateau. 



She still has bilateral chest tubes. 



Her hemoglobin is stable at 8.8 today, but it has been as low as 5.4 on the 6th.  We

will continue to follow that closely. 



Family is very reasonable in their expectations in my opinion.  If she is not

improved within a week after her tracheostomy, they are willing to consider

withdrawal of care.  We will __________ 







Job ID:  [id number]

## 2020-11-12 LAB
ANION GAP SERPL CALC-SCNC: 15 MMOL/L (ref 10–20)
BUN SERPL-MCNC: 95 MG/DL (ref 9.8–20.1)
CALCIUM SERPL-MCNC: 6.9 MG/DL (ref 7.8–10.44)
CHLORIDE SERPL-SCNC: 102 MMOL/L (ref 98–107)
CO2 SERPL-SCNC: 23 MMOL/L (ref 23–31)
CREAT CL PREDICTED SERPL C-G-VRATE: 33 ML/MIN (ref 70–130)
CRP SERPL-MCNC: 7.72 MG/DL
GLUCOSE SERPL-MCNC: 283 MG/DL (ref 80–115)
HGB BLD-MCNC: 9.7 G/DL (ref 12–16)
MCH RBC QN AUTO: 31.4 PG (ref 27–31)
MCV RBC AUTO: 90.6 FL (ref 78–98)
MDIFF COMPLETE?: YES
PLATELET # BLD AUTO: 115 THOU/UL (ref 130–400)
POTASSIUM SERPL-SCNC: 3 MMOL/L (ref 3.5–5.1)
RBC # BLD AUTO: 3.08 MILL/UL (ref 4.2–5.4)
SODIUM SERPL-SCNC: 137 MMOL/L (ref 136–145)
WBC # BLD AUTO: 11.4 THOU/UL (ref 4.8–10.8)

## 2020-11-12 RX ADMIN — GUAIFENESIN SCH MG: 200 SOLUTION ORAL at 12:41

## 2020-11-12 RX ADMIN — INSULIN LISPRO PRN UNIT: 100 INJECTION, SOLUTION INTRAVENOUS; SUBCUTANEOUS at 11:11

## 2020-11-12 RX ADMIN — INSULIN LISPRO PRN UNIT: 100 INJECTION, SOLUTION INTRAVENOUS; SUBCUTANEOUS at 05:21

## 2020-11-12 RX ADMIN — POLYETHYLENE GLYCOL 400 AND PROPYLENE GLYCOL SCH EACH: 4; 3 SOLUTION/ DROPS OPHTHALMIC at 21:11

## 2020-11-12 RX ADMIN — INSULIN LISPRO PRN UNIT: 100 INJECTION, SOLUTION INTRAVENOUS; SUBCUTANEOUS at 22:56

## 2020-11-12 RX ADMIN — DORZOLAMIDE HYDROCHLORIDE AND TIMOLOL MALEATE SCH EACH: 22.3; 6.8 SOLUTION/ DROPS OPHTHALMIC at 08:49

## 2020-11-12 RX ADMIN — Medication SCH UNITS: at 08:51

## 2020-11-12 RX ADMIN — GUAIFENESIN SCH MG: 200 SOLUTION ORAL at 08:50

## 2020-11-12 RX ADMIN — Medication SCH MG: at 08:47

## 2020-11-12 RX ADMIN — GUAIFENESIN SCH MG: 200 SOLUTION ORAL at 16:39

## 2020-11-12 RX ADMIN — DORZOLAMIDE HYDROCHLORIDE AND TIMOLOL MALEATE SCH EACH: 22.3; 6.8 SOLUTION/ DROPS OPHTHALMIC at 14:46

## 2020-11-12 RX ADMIN — GUAIFENESIN SCH MG: 200 SOLUTION ORAL at 05:21

## 2020-11-12 RX ADMIN — Medication SCH MG: at 21:08

## 2020-11-12 RX ADMIN — INSULIN GLARGINE SCH MLS: 100 INJECTION, SOLUTION SUBCUTANEOUS at 21:10

## 2020-11-12 RX ADMIN — POLYETHYLENE GLYCOL 400 AND PROPYLENE GLYCOL SCH EACH: 4; 3 SOLUTION/ DROPS OPHTHALMIC at 08:49

## 2020-11-12 RX ADMIN — INSULIN LISPRO PRN UNIT: 100 INJECTION, SOLUTION INTRAVENOUS; SUBCUTANEOUS at 15:55

## 2020-11-12 RX ADMIN — DORZOLAMIDE HYDROCHLORIDE AND TIMOLOL MALEATE SCH EACH: 22.3; 6.8 SOLUTION/ DROPS OPHTHALMIC at 21:09

## 2020-11-12 RX ADMIN — BRIMONIDINE TARTRATE SCH DROP: 2 SOLUTION OPHTHALMIC at 08:49

## 2020-11-12 RX ADMIN — SCOPALAMINE SCH MG: 1 PATCH, EXTENDED RELEASE TRANSDERMAL at 11:14

## 2020-11-12 RX ADMIN — GUAIFENESIN SCH MG: 200 SOLUTION ORAL at 00:32

## 2020-11-12 RX ADMIN — Medication SCH ML: at 21:11

## 2020-11-12 RX ADMIN — GUAIFENESIN SCH MG: 200 SOLUTION ORAL at 21:09

## 2020-11-12 RX ADMIN — Medication SCH ML: at 09:08

## 2020-11-12 RX ADMIN — BRIMONIDINE TARTRATE SCH DROP: 2 SOLUTION OPHTHALMIC at 21:08

## 2020-11-12 NOTE — PRG
DATE OF SERVICE:  11/12/2020



SUBJECTIVE:  Ms. Sanches is a 64-year-old  female with COVID-19 
pneumonia,

acute respiratory failure and being followed up by the Renal Service for her 
acute

kidney injury on top of her chronic renal failure.  She has had progressive 
azotemia

and has been initiated on dialysis due to volume overload.  She is undergoing 
every

other day dialysis.  At the present time, she is undergoing hemodialysis.  
Again, I

am attempting fluid removal of about 3 L as tolerated by the patient.  During 
this

hospitalization, she has developed  with bilateral pneumothorax.  Chest

tube has been inserted.  She has remained stable in the last few days. 



OBJECTIVE:  VITAL SIGNS:  Blood pressure 132/70, heart rate 103, O2 saturation 
94%. 

GENERAL:  The patient is unresponsive, intubated on ventilator support. 

SKIN:  Adequate turgor. 

HEENT:  She has slightly pale conjunctivae.  Anicteric sclerae.  No neck mass.  
No

carotid bruits.  No JVD. 

CHEST:  No deformities. 

LUNGS:  Decreased breath sounds. 

HEART:  Normal sinus rhythm.  No murmurs, no gallops, no rubs.  The patient is

positive for a bilateral chest tube. 

ABDOMEN:  Globular, soft, nontender.  No masses. 

EXTREMITIES:  Trace edema.



MEDICATIONS:  Medications of November 12, 2020, reviewed.



LABORATORY DATA:  Laboratories of November 12, 2020; white count 11.4, 
hemoglobin

9.7.  Sodium 137, potassium 3, chloride 102, carbon dioxide 23, BUN 95, 
creatinine

2.16, glucose 283, calcium 6.9.  C-reactive protein 7.72. 



ASSESSMENT AND PLAN:  

1. Acute kidney injury on top of chronic renal failure due to volume overload,

currently undergoing every other day hemodialysis for fluid removal.  So far

tolerating said treatment.  As previously mentioned, attempt 3 L fluid removal 
as

tolerated. 

2. Anemia.  P.r.n. blood transfusion.

3. Mild hypokalemia/mild hypocalcemia.  Adjust with dialysis bath.

4. COVID-19 pneumonia/acute respiratory failure, for possible tracheostomy 
placement

tomorrow. 

5. Overall prognosis remains guarded.







Job ID:  [id number]



MTDD

## 2020-11-12 NOTE — PRG
DATE OF SERVICE:  11/12/2020



SUBJECTIVE:  Ms. Sanches remains encephalopathic.  She is tentatively on schedule for

trach tomorrow. 



OBJECTIVE:  VITAL SIGNS:  Blood pressure 165/65, heart rate is 80, FiO2 is down to

40% this afternoon. 

GENERAL:  She was dialyzed 1100 mL today. 

LUNGS:  Remain unchanged. 

HEART:  Remain unchanged. 

ABDOMEN:  Remain unchanged.



LABORATORY DATA:  White count 11.4, hemoglobin 9.7, platelets 115.  Sodium 137,

potassium 3, chloride 102, bicarb 23, BUN 95, and creatinine 2.6. 



IMPRESSION:  

1. COVID-19, respiratory failure, and renal failure.

2. Encephalopathy, most likely secondary to COVID-19.

3. Obesity.

4. Deconditioning. 

She will have a tracheostomy and a PEG.  If she has not improved by the end of next

week, family says that they are willing to consider withdrawal of support. 







Job ID:  [id number]

## 2020-11-13 LAB
ANION GAP SERPL CALC-SCNC: 14 MMOL/L (ref 10–20)
BUN SERPL-MCNC: 61 MG/DL (ref 9.8–20.1)
CALCIUM SERPL-MCNC: 6.8 MG/DL (ref 7.8–10.44)
CHLORIDE SERPL-SCNC: 100 MMOL/L (ref 98–107)
CO2 SERPL-SCNC: 26 MMOL/L (ref 23–31)
CREAT CL PREDICTED SERPL C-G-VRATE: 43 ML/MIN (ref 70–130)
CRP SERPL-MCNC: 10.24 MG/DL
GLUCOSE SERPL-MCNC: 170 MG/DL (ref 80–115)
HGB BLD-MCNC: 9.4 G/DL (ref 12–16)
MCH RBC QN AUTO: 31.8 PG (ref 27–31)
MCV RBC AUTO: 89.4 FL (ref 78–98)
MDIFF COMPLETE?: YES
PLATELET # BLD AUTO: 129 THOU/UL (ref 130–400)
POTASSIUM SERPL-SCNC: 3.5 MMOL/L (ref 3.5–5.1)
RBC # BLD AUTO: 2.95 MILL/UL (ref 4.2–5.4)
SODIUM SERPL-SCNC: 136 MMOL/L (ref 136–145)
WBC # BLD AUTO: 11.2 THOU/UL (ref 4.8–10.8)

## 2020-11-13 RX ADMIN — Medication SCH MG: at 09:52

## 2020-11-13 RX ADMIN — DORZOLAMIDE HYDROCHLORIDE AND TIMOLOL MALEATE SCH EACH: 22.3; 6.8 SOLUTION/ DROPS OPHTHALMIC at 21:22

## 2020-11-13 RX ADMIN — GUAIFENESIN SCH MG: 200 SOLUTION ORAL at 09:52

## 2020-11-13 RX ADMIN — GUAIFENESIN SCH: 200 SOLUTION ORAL at 17:00

## 2020-11-13 RX ADMIN — NICARDIPINE HYDROCHLORIDE SCH MLS: 25 INJECTION INTRAVENOUS at 14:59

## 2020-11-13 RX ADMIN — DORZOLAMIDE HYDROCHLORIDE AND TIMOLOL MALEATE SCH EACH: 22.3; 6.8 SOLUTION/ DROPS OPHTHALMIC at 15:02

## 2020-11-13 RX ADMIN — BRIMONIDINE TARTRATE SCH DROP: 2 SOLUTION OPHTHALMIC at 21:21

## 2020-11-13 RX ADMIN — Medication SCH MG: at 21:21

## 2020-11-13 RX ADMIN — BRIMONIDINE TARTRATE SCH DROP: 2 SOLUTION OPHTHALMIC at 11:00

## 2020-11-13 RX ADMIN — Medication SCH ML: at 21:24

## 2020-11-13 RX ADMIN — Medication SCH UNITS: at 11:14

## 2020-11-13 RX ADMIN — INSULIN GLARGINE SCH MLS: 100 INJECTION, SOLUTION SUBCUTANEOUS at 21:23

## 2020-11-13 RX ADMIN — INSULIN LISPRO PRN UNIT: 100 INJECTION, SOLUTION INTRAVENOUS; SUBCUTANEOUS at 12:32

## 2020-11-13 RX ADMIN — GUAIFENESIN SCH MG: 200 SOLUTION ORAL at 06:12

## 2020-11-13 RX ADMIN — GUAIFENESIN SCH MG: 200 SOLUTION ORAL at 02:02

## 2020-11-13 RX ADMIN — DORZOLAMIDE HYDROCHLORIDE AND TIMOLOL MALEATE SCH EACH: 22.3; 6.8 SOLUTION/ DROPS OPHTHALMIC at 10:50

## 2020-11-13 RX ADMIN — POLYETHYLENE GLYCOL 400 AND PROPYLENE GLYCOL SCH EACH: 4; 3 SOLUTION/ DROPS OPHTHALMIC at 11:14

## 2020-11-13 RX ADMIN — POLYETHYLENE GLYCOL 400 AND PROPYLENE GLYCOL SCH EACH: 4; 3 SOLUTION/ DROPS OPHTHALMIC at 21:22

## 2020-11-13 RX ADMIN — GUAIFENESIN SCH MG: 200 SOLUTION ORAL at 21:22

## 2020-11-13 RX ADMIN — GUAIFENESIN SCH MG: 200 SOLUTION ORAL at 12:35

## 2020-11-13 RX ADMIN — Medication SCH ML: at 10:52

## 2020-11-13 NOTE — PRG
DATE OF SERVICE:  11/13/2020



SUBJECTIVE:  Ms. Sanches remains encephalopathic.  Her eyes open and she blinks, but

she does not focus or track. 



OBJECTIVE:  LUNGS:  Remarkable for coarse equal breath sounds. 

HEART:  Regular rhythm. 

ABDOMEN:  Soft. 

EXTREMITIES:  Unchanged.



LABORATORY DATA:  White count 11.2, hemoglobin 9.4, platelets 129.  Sodium 136,

potassium 3.5, chloride 100, bicarb 26, BUN 61, creatinine 1.69. 



IMPRESSION:  

1. COVID pneumonia.

2. Encephalopathy, most likely secondary to COVID.

3. Renal failure.

4. Diabetes.

5. Obesity.

6. Deconditioning. 

She is tentatively scheduled to have a tracheostomy later today.  We will continue

to follow. 







Job ID:  [id number]

## 2020-11-13 NOTE — PRG
DATE OF SERVICE:  11/13/2020



SUBJECTIVE:  Ms. Sanches is a 64-year-old  female who was admitted for

COVID-19 pneumonia.  She has gone into acute respiratory failure and has had

developed pneumothorax.  She currently has bilateral chest tubes. 



We are following her up due to her progressive azotemia and has been initiated on

dialysis due to the volume overload.  The plan is to have a tracheostomy and PEG

tube placed with her as well as a cuffed hemodialysis catheter. 



OBJECTIVE:  VITAL SIGNS:  Blood pressure 170/71, heart rate is 84, O2 saturation

96%. 

GENERAL:  She is noted to be unresponsive, intubated on ventilator support. 

SKIN:  Adequate turgor. 

HEENT:  She has slightly pale conjunctivae.  Anicteric sclerae.  No neck mass.  No

carotid bruits.  No JVD. 

CHEST:  No deformities. 

LUNGS:  Decreased breath sounds. 

HEART:  Normal sinus rhythm.  No murmurs, no gallops, no rubs. 

ABDOMEN:  Globular, soft, nontender.  No masses. 

EXTREMITIES:  No edema.



MEDICATIONS:  Medications of November 13, 2020, reviewed.



LABORATORY DATA:  Laboratories of November 13, 2020; white count 11.2, hemoglobin

9.4.  Potassium 3.5, sodium 136, chloride 100, carbon dioxide 26, BUN 61, creatinine

1.69, calcium is 6.8.  C-reactive protein 10. 



ASSESSMENT AND PLAN:  

1. Acute respiratory failure/COVID-19 pneumonia.  Continue supportive care.

2. Acute kidney injury/chronic renal failure-currently on maintenance hemodialysis 

every other day due to volume overload.  Fluid removal was done yesterday.  We will

again re-evaluate her tomorrow for another dialysis.  There is no indication for 

any dialytic intervention at the present time.

3. Overall prognosis remains guarded.







Job ID:  [id number]

## 2020-11-14 LAB
ANION GAP SERPL CALC-SCNC: 15 MMOL/L (ref 10–20)
BUN SERPL-MCNC: 77 MG/DL (ref 9.8–20.1)
CALCIUM SERPL-MCNC: 6.6 MG/DL (ref 7.8–10.44)
CHLORIDE SERPL-SCNC: 101 MMOL/L (ref 98–107)
CO2 SERPL-SCNC: 23 MMOL/L (ref 23–31)
CREAT CL PREDICTED SERPL C-G-VRATE: 33 ML/MIN (ref 70–130)
CRP SERPL-MCNC: 12.09 MG/DL
GLUCOSE SERPL-MCNC: 184 MG/DL (ref 80–115)
HGB BLD-MCNC: 8.4 G/DL (ref 12–16)
MCH RBC QN AUTO: 31.7 PG (ref 27–31)
MCV RBC AUTO: 90.8 FL (ref 78–98)
MDIFF COMPLETE?: YES
PLATELET # BLD AUTO: 121 THOU/UL (ref 130–400)
POTASSIUM SERPL-SCNC: 3.3 MMOL/L (ref 3.5–5.1)
RBC # BLD AUTO: 2.66 MILL/UL (ref 4.2–5.4)
SODIUM SERPL-SCNC: 136 MMOL/L (ref 136–145)
WBC # BLD AUTO: 10.4 THOU/UL (ref 4.8–10.8)

## 2020-11-14 PROCEDURE — B518ZZA FLUOROSCOPY OF SUPERIOR VENA CAVA, GUIDANCE: ICD-10-PCS | Performed by: SURGERY

## 2020-11-14 PROCEDURE — 0B110F4 BYPASS TRACHEA TO CUTANEOUS WITH TRACHEOSTOMY DEVICE, OPEN APPROACH: ICD-10-PCS | Performed by: SURGERY

## 2020-11-14 PROCEDURE — B548ZZA ULTRASONOGRAPHY OF SUPERIOR VENA CAVA, GUIDANCE: ICD-10-PCS | Performed by: SURGERY

## 2020-11-14 PROCEDURE — 0W9930Z DRAINAGE OF RIGHT PLEURAL CAVITY WITH DRAINAGE DEVICE, PERCUTANEOUS APPROACH: ICD-10-PCS | Performed by: SURGERY

## 2020-11-14 PROCEDURE — 02HV33Z INSERTION OF INFUSION DEVICE INTO SUPERIOR VENA CAVA, PERCUTANEOUS APPROACH: ICD-10-PCS | Performed by: SURGERY

## 2020-11-14 PROCEDURE — 0JH60XZ INSERTION OF TUNNELED VASCULAR ACCESS DEVICE INTO CHEST SUBCUTANEOUS TISSUE AND FASCIA, OPEN APPROACH: ICD-10-PCS | Performed by: SURGERY

## 2020-11-14 PROCEDURE — 0DH63UZ INSERTION OF FEEDING DEVICE INTO STOMACH, PERCUTANEOUS APPROACH: ICD-10-PCS | Performed by: SURGERY

## 2020-11-14 RX ADMIN — POLYETHYLENE GLYCOL 400 AND PROPYLENE GLYCOL SCH EACH: 4; 3 SOLUTION/ DROPS OPHTHALMIC at 13:10

## 2020-11-14 RX ADMIN — DEXTROSE MONOHYDRATE PRN GM: 25 INJECTION, SOLUTION INTRAVENOUS at 22:20

## 2020-11-14 RX ADMIN — NICARDIPINE HYDROCHLORIDE SCH MLS: 25 INJECTION INTRAVENOUS at 07:01

## 2020-11-14 RX ADMIN — BRIMONIDINE TARTRATE SCH DROP: 2 SOLUTION OPHTHALMIC at 12:58

## 2020-11-14 RX ADMIN — Medication SCH MG: at 10:03

## 2020-11-14 RX ADMIN — GUAIFENESIN SCH MG: 200 SOLUTION ORAL at 10:06

## 2020-11-14 RX ADMIN — GUAIFENESIN SCH MG: 200 SOLUTION ORAL at 06:08

## 2020-11-14 RX ADMIN — GUAIFENESIN SCH MG: 200 SOLUTION ORAL at 22:25

## 2020-11-14 RX ADMIN — DORZOLAMIDE HYDROCHLORIDE AND TIMOLOL MALEATE SCH: 22.3; 6.8 SOLUTION/ DROPS OPHTHALMIC at 22:26

## 2020-11-14 RX ADMIN — BRIMONIDINE TARTRATE SCH DROP: 2 SOLUTION OPHTHALMIC at 22:27

## 2020-11-14 RX ADMIN — INSULIN GLARGINE SCH: 100 INJECTION, SOLUTION SUBCUTANEOUS at 22:20

## 2020-11-14 RX ADMIN — GUAIFENESIN SCH MG: 200 SOLUTION ORAL at 22:12

## 2020-11-14 RX ADMIN — Medication SCH MG: at 22:14

## 2020-11-14 RX ADMIN — DORZOLAMIDE HYDROCHLORIDE AND TIMOLOL MALEATE SCH EACH: 22.3; 6.8 SOLUTION/ DROPS OPHTHALMIC at 10:18

## 2020-11-14 RX ADMIN — POLYETHYLENE GLYCOL 400 AND PROPYLENE GLYCOL SCH EACH: 4; 3 SOLUTION/ DROPS OPHTHALMIC at 22:23

## 2020-11-14 RX ADMIN — DORZOLAMIDE HYDROCHLORIDE AND TIMOLOL MALEATE SCH EACH: 22.3; 6.8 SOLUTION/ DROPS OPHTHALMIC at 22:26

## 2020-11-14 RX ADMIN — Medication SCH UNITS: at 10:14

## 2020-11-14 RX ADMIN — Medication SCH ML: at 22:13

## 2020-11-14 RX ADMIN — GUAIFENESIN SCH MG: 200 SOLUTION ORAL at 13:57

## 2020-11-14 RX ADMIN — Medication SCH ML: at 10:04

## 2020-11-14 RX ADMIN — BACTERIOSTATIC WATER PRN ML: 1 INJECTION, SOLUTION INTRAMUSCULAR; INTRAVENOUS; SUBCUTANEOUS at 22:15

## 2020-11-14 RX ADMIN — GUAIFENESIN SCH MG: 200 SOLUTION ORAL at 01:59

## 2020-11-14 NOTE — PRG
DATE OF SERVICE:  11/14/2020



SUBJECTIVE:  Ms. Sanches is a 65-year-old  female, who was admitted for

COVID-19 pneumonia.  Currently in acute respiratory failure and still on ventilator

support.  We are following her up for her acute kidney injury on top of her chronic

renal failure as well as her volume overload.  She continues to undergo hemodialysis

every other day for fluid removal.  She is undergoing hemodialysis today.  We are

attempting about between 2 and 2.5 L of fluid removal.  She is also scheduled for a

tracheostomy, PEG tube placement, and a cuffed hemodialysis catheter. 



OBJECTIVE:  VITAL SIGNS:  Blood pressure is 125/62 and heart rate 86. 

GENERAL:  She has spontaneous eye opening, but not verbally responsive, intubated on

a ventilator support. 

SKIN:  Adequate turgor. 

HEENT:  Slightly pale conjunctivae.  Anicteric sclerae.  No neck mass.  No carotid

bruits.  No JVD. 

CHEST:  No deformities. 

LUNGS:  Decreased breath sounds. 

HEART:  Normal sinus rhythm.  No murmurs, gallops, or rubs. 

ABDOMEN:  Globular, soft, and nontender.  No masses. 

EXTREMITIES:  No edema.  No deformities.



MEDICATIONS:  Of November 14, 2020, was reviewed.



LABORATORY DATA:  Of November 14, 2020; white count 10.4, hemoglobin 8.4.  Sodium

136, potassium 3.3, chloride 101, carbon dioxide 23, BUN 77, creatinine 2.09,

glucose 184, and calcium 6.6.  C-reactive protein is 12. 



ASSESSMENT AND PLAN:  

1. Chronic renal failure/acute kidney injury, every-other-day hemodialysis for fluid

removal.  Tolerating said treatment.  Attempting between 2 and 2.5 L fluid removal

today. 

2. Anemia.  P.r.n. blood transfusion.

3. Acute respiratory failure/COVID-19 pneumonia.  Continue supportive care.  The

patient is due for a PEG tube placement, tracheostomy, and a tunneled dialysis

catheter placement.  Again, overall prognosis remains guarded with this patient. 







Job ID:  [id number]

## 2020-11-14 NOTE — RAD
EXAM: Single view of the chest



HISTORY:   Chest tube placement for right pneumothorax



COMPARISON: 11/14/2020 at 6:26 PM



FINDINGS: Single view of the chest shows an enlarged but stable cardiomediastinal silhouette.  There 
has been interval placement of a right-sided chest tube with slight decrease in size of the right

pneumothorax. The other lines and tubes are unchanged in position. There is still a small left apical
 pneumothorax and a small right-sided pneumothorax.  Multifocal opacities are seen in the lungs.

Subcutaneous air is seen.



IMPRESSION: Interval placement of right-sided chest tube with small bilateral pneumothoraces.



Reported By: Dat Marquez 

Electronically Signed:  11/14/2020 7:24 PM

## 2020-11-14 NOTE — PRG
DATE OF SERVICE:  



SUBJECTIVE:  Ms. Sanches continues to receive ongoing ventilatory support for her

COVID pneumonia.  She is tentatively scheduled to receive PEG and trach today.  She

has been receiving dialysis on an every other day schedule with last yesterday.

Unfortunately, she is not really having appropriate neurologic recovery and has been

off her sedation. 



PHYSICAL EXAMINATION:

VITAL SIGNS:  Blood pressure high, running from 180 to 193 systolic.  Heart rate in

the mid to low 80s.  Saturation 94%, currently on FiO2 of 50, and pressure control

of 28. 

GENERAL:  Eyes are open, but she is not following commands from me today. 

NECK:  Shows no adenopathy.  She has external jugular venous distention. 

LUNGS:  Show coarse rhonchi. 

HEART:  Regular rate and rhythm. 

ABDOMEN:  Soft.  There is no organomegaly.  She has no edema.  She has no clubbing.



LABORATORY DATA:  White count today 10,400, hemoglobin is 8.4 with hematocrit of

24.2, and platelet count of 121,000.  Differential includes 75 segs and 20 bands.

Electrolytes include sodium 136, potassium 3.3, chloride 101, CO2 is 23, BUN 77,

creatinine 2.1.  CRP remains elevated at 12. 



There is no chest x-ray yet today.



IMPRESSION:  COVID pneumonia with persistent ventilatory support.  Today is hospital

day 33.  Plan is for trach and PEG.  She is still requiring FiO2 of 50%.  The

combination of COVID necessitating dialysis, represents an extremely poor prognosis.

 We will continue other supportive therapies. 



PLAN:  She is to have PEG and trach today.  I will try to look at her medicine list

and see if we can trim out some of the medicines which she is not receiving.

Unfortunately, 

her global prognosis remains very poor.  Decision regarding dialysis is deferred to

Nephrology Service. 



Critical care 31 minutes.







Job ID:  [id number]

## 2020-11-14 NOTE — RAD
EXAM: Single view of the chest



HISTORY:   Left pneumothorax status post chest tube placement



COMPARISON: 11/14/2020 at 6:07 PM



FINDINGS: Single view of the chest shows an enlarged but stable cardiomediastinal silhouette.  There 
is a new left-sided chest tube. The other lines and tubes are unchanged in position. The left-sided

pneumothorax has decreased in size with a small apical pneumothorax. A small right pneumothorax is al
so seen.  Multifocal airspace opacities are seen. Mediastinal air and subcutaneous air are seen.



IMPRESSION: 

1. Status post left chest tube placement with decreased size of left pneumothorax.

2. Multifocal pneumonia



Reported By: Dat Marquez 

Electronically Signed:  11/14/2020 7:04 PM

## 2020-11-14 NOTE — RAD
EXAM: Single view of the chest



HISTORY:   Central line placement.



COMPARISON: 11/10/2020



FINDINGS: Single view of the chest shows an enlarged but stable cardiomediastinal silhouette.  A righ
t IJ central venous catheter seen with its tip in the superior vena cava. A left IJ central line is

seen with its tip in the superior vena cava. Both chest tubes have been removed. There is a small rig
ht and moderate left pneumothorax.  Multifocal opacities are seen in the lungs. A tracheostomy is

seen with its tip overlying the trachea. There is likely air in the mediastinum. No acute osseous abn
ormality. Air is seen in the chest wall.



IMPRESSION:

1. Bilateral pneumothoraces, left greater than right.

2. Multifocal pneumonia

The patient's nurse Danna notified of findings at 6:15 PM on 11/14/2020



Reported By: Dat Marquez 

Electronically Signed:  11/14/2020 6:16 PM

## 2020-11-15 LAB
ANION GAP SERPL CALC-SCNC: 16 MMOL/L (ref 10–20)
BUN SERPL-MCNC: 62 MG/DL (ref 9.8–20.1)
CALCIUM SERPL-MCNC: 6.4 MG/DL (ref 7.8–10.44)
CHLORIDE SERPL-SCNC: 100 MMOL/L (ref 98–107)
CO2 SERPL-SCNC: 23 MMOL/L (ref 23–31)
CREAT CL PREDICTED SERPL C-G-VRATE: 38 ML/MIN (ref 70–130)
CRP SERPL-MCNC: 10.52 MG/DL
GLUCOSE SERPL-MCNC: 223 MG/DL (ref 80–115)
HGB BLD-MCNC: 8.2 G/DL (ref 12–16)
MCH RBC QN AUTO: 31.7 PG (ref 27–31)
MCV RBC AUTO: 90 FL (ref 78–98)
MDIFF COMPLETE?: YES
PLATELET # BLD AUTO: 144 THOU/UL (ref 130–400)
POTASSIUM SERPL-SCNC: 3.7 MMOL/L (ref 3.5–5.1)
RBC # BLD AUTO: 2.59 MILL/UL (ref 4.2–5.4)
SODIUM SERPL-SCNC: 135 MMOL/L (ref 136–145)
WBC # BLD AUTO: 17.2 THOU/UL (ref 4.8–10.8)

## 2020-11-15 RX ADMIN — DORZOLAMIDE HYDROCHLORIDE AND TIMOLOL MALEATE SCH EACH: 22.3; 6.8 SOLUTION/ DROPS OPHTHALMIC at 21:17

## 2020-11-15 RX ADMIN — BRIMONIDINE TARTRATE SCH DROP: 2 SOLUTION OPHTHALMIC at 09:13

## 2020-11-15 RX ADMIN — GUAIFENESIN SCH MG: 200 SOLUTION ORAL at 21:20

## 2020-11-15 RX ADMIN — GUAIFENESIN SCH MG: 200 SOLUTION ORAL at 13:00

## 2020-11-15 RX ADMIN — BRIMONIDINE TARTRATE SCH DROP: 2 SOLUTION OPHTHALMIC at 22:52

## 2020-11-15 RX ADMIN — INSULIN LISPRO PRN UNIT: 100 INJECTION, SOLUTION INTRAVENOUS; SUBCUTANEOUS at 05:28

## 2020-11-15 RX ADMIN — Medication SCH UNITS: at 09:49

## 2020-11-15 RX ADMIN — GUAIFENESIN SCH MG: 200 SOLUTION ORAL at 05:27

## 2020-11-15 RX ADMIN — GUAIFENESIN SCH MG: 200 SOLUTION ORAL at 00:15

## 2020-11-15 RX ADMIN — Medication SCH ML: at 09:50

## 2020-11-15 RX ADMIN — POLYETHYLENE GLYCOL 400 AND PROPYLENE GLYCOL SCH EACH: 4; 3 SOLUTION/ DROPS OPHTHALMIC at 21:15

## 2020-11-15 RX ADMIN — DORZOLAMIDE HYDROCHLORIDE AND TIMOLOL MALEATE SCH EACH: 22.3; 6.8 SOLUTION/ DROPS OPHTHALMIC at 16:45

## 2020-11-15 RX ADMIN — INSULIN GLARGINE SCH MLS: 100 INJECTION, SOLUTION SUBCUTANEOUS at 21:14

## 2020-11-15 RX ADMIN — Medication SCH MG: at 09:25

## 2020-11-15 RX ADMIN — POLYETHYLENE GLYCOL 400 AND PROPYLENE GLYCOL SCH EACH: 4; 3 SOLUTION/ DROPS OPHTHALMIC at 09:14

## 2020-11-15 RX ADMIN — Medication SCH MG: at 21:05

## 2020-11-15 RX ADMIN — DORZOLAMIDE HYDROCHLORIDE AND TIMOLOL MALEATE SCH EACH: 22.3; 6.8 SOLUTION/ DROPS OPHTHALMIC at 09:42

## 2020-11-15 RX ADMIN — BACTERIOSTATIC WATER PRN ML: 1 INJECTION, SOLUTION INTRAMUSCULAR; INTRAVENOUS; SUBCUTANEOUS at 21:04

## 2020-11-15 RX ADMIN — GUAIFENESIN SCH MG: 200 SOLUTION ORAL at 16:49

## 2020-11-15 RX ADMIN — GUAIFENESIN SCH MG: 200 SOLUTION ORAL at 09:49

## 2020-11-15 RX ADMIN — SCOPALAMINE SCH MG: 1 PATCH, EXTENDED RELEASE TRANSDERMAL at 11:21

## 2020-11-15 RX ADMIN — Medication SCH ML: at 21:16

## 2020-11-15 NOTE — PRG
DATE OF SERVICE:  11/15/2020



SUBJECTIVE:  Ms. Sanches is a 65-year-old  female, followed up by the Renal

Service for her acute kidney injury on top of her chronic renal failure.  She has

been volume overloaded in the past.  For that reason, she was initiated on dialysis.

 Fluid removal is being done every other day with every other day hemodialysis.

This patient still remains on the oliguric side.  Yesterday, she underwent

tracheostomy with a cuffed hemodialysis catheter placement as well as a PEG tube.  ?

A new pneumothorax was considered.  She still has bilateral chest tube.

Cardiothoracic and Vascular Surgery is also following up.  No acute events noted

last night. 



OBJECTIVE:  VITAL SIGNS:  Blood pressure 128/53, heart rate 75, respiratory rate is

30, O2 saturation is currently ranging at around 87%. 

GENERAL:  She is unresponsive, on ventilator support.  Obese. 

SKIN:  Adequate turgor. 

HEENT:  Pinkish conjunctivae.  Anicteric sclerae.  No neck mass.  No carotid bruits.

 No JVD. 

CHEST:  No deformities.  Positive for bilateral chest tubes.  Please note, the

patient is extubated, but has a tracheostomy. 

HEART:  Normal sinus rhythm.  No murmur.  No gallops.  No rubs. 

ABDOMEN:  Globular, soft, nontender, no masses.  Positive for PEG tube. 

EXTREMITIES:  Trace edema.



MEDICATIONS:  Of November 15, 2020, reviewed.



LABORATORY DATA:  Of November 15, 2020; white count 17.2, hemoglobin 8.2.  Sodium

135, potassium 3.7, chloride 100, carbon dioxide 23, BUN 62, creatinine 1.88,

calcium 6.4, ferritin 717.  C-reactive protein 10.5. 



ASSESSMENT AND PLAN:  

1. COVID-19 pneumonia/acute respiratory failure, still ventilator dependent.

Currently, the patient has a tracheostomy and is essentially extubated.  Continuing

supportive care. 

2. Acute kidney injury/chronic renal failure-most likely a superimposed acute

tubular necrosis on top of her chronic renal failure.  Continue supportive

hemodialysis for fluid removal.  I will re-evaluate her this week if we can get away

with three times a week hemodialysis with this patient. 

3. Anemia.  P.r.n. blood transfusion.  Overall, prognosis remains guarded with this

patient. 







Job ID:  [id number]

## 2020-11-15 NOTE — PRG
DATE OF SERVICE:  11/15/2020



SUBJECTIVE:  Ms. Sanches had placement of a trach and PEG yesterday.  Following

return to the intensive care unit, her x-ray demonstrated bilateral pneumothorax,

left greater than right.  She had previously had bilateral chest tubes, which had

been removed in the days prior to surgery.  Unfortunately, we do not have an

interval x-ray to evaluate her lung from the time that was removed until after the

surgery.  As such, it is difficult to tell whether this is a relapse of a previous

pneumothorax or possibly related to the procedure itself.  In any circumstance,

bilateral chest tubes have been reinserted.  The left lung shows only a very small

apical pneumothorax.  There is still some residual pneumothorax along the lateral

right chest.  The underlying lung remains abnormal, especially on the right. 



PHYSICAL EXAMINATION:

VITAL SIGNS:  Blood pressure 153/63, heart rate is 89.  She is afebrile.  Current

ventilator settings include rate of 22, pressure support of 28, FiO2 of 70%. 

GENERAL:  She is sedated, does not respond to verbal stimuli.  Tracheostomy looks

okay. 

LUNGS:  Show bilateral coarse rhonchi without any wheezes. 

HEART:  Regular rate and rhythm. 

ABDOMEN:  Soft. 

EXTREMITIES:  She has 1+ edema.



LABORATORY DATA:  White count 17,200, hemoglobin is 8.2 with hematocrit of 23.3,

differential including 93 segs and 3 bands.  D-dimer is minimally elevated to 2.3.

Electrolytes include sodium 135, potassium 3.7, chloride 100, CO2 is 23, BUN 62,

creatinine 1.88.  Ferritin and CRP remain elevated. 



Chest x-ray is as above.



IMPRESSION:  

1. COVID pneumonia with respiratory failure complicated by bilateral pneumothoraces.

2. Renal insufficiency, acute on chronic, receiving dialysis, predominantly for

volume control. 

3. Encephalopathy most likely related to COVID pneumonia, prolonged ventilator stay,

even hypoxia. 



PLAN:  We will continue current supportive therapies.  Nephrology service will

determine the need for intermittent dialysis.  Chest tubes are in place and we will

continue to observe.  There is no significant room for weaning and recovery remains

remote at best. 



Critical care, 32 minutes.







Job ID:  [id number]

## 2020-11-15 NOTE — CON
DATE OF CONSULTATION:  11/15/2020



REQUESTING PHYSICIAN:  Roderick Gaitan MD



REASON FOR CONSULTATION:  Bilateral pneumothoraces.



HISTORY OF PRESENT ILLNESS:  The patient is a 65-year-old woman admitted with

shortness of breath a little over a month ago.  She was COVID positive, had

worsening infiltrates and shortness of breath, and had to be intubated.  About 10

days later, she has had multiorgan failure and about 10 days ago developed

pneumothoraces requiring bilateral chest tube placement.  I was contacted by Dr. Gaitan yesterday evening.  He had performed multiple supportive procedures that

included tunneled right internal jugular dialysis catheter placement and left

internal jugular central line placement.  A postprocedure film demonstrated

bilateral pneumothoraces.  He was not comfortable that he could conclusively say

that these were not new or worsened, linked to those procedures and he opted to

place bilateral chest tubes. 



PAST MEDICAL HISTORY:  Significant for obesity, diabetes mellitus, hypertension,

hypothyroidism, and congestive heart failure. 



HOME MEDICATIONS:  

1. Levemir insulin.

2. Glipizide.

3. Lopressor.

4. Hydralazine.

5. Norvasc.

6. Nexium.

7. Lasix.

8. Synthroid.

9. Cosopt ophthalmic solution.

10. She has been on steroids.

11. Azithromycin.

12. Full-dose Lovenox.

13. Eliquis.

14. Plavix.



ALLERGIES:  PER HER CHART, SHE HAS NO KNOWN ALLERGIES.



SOCIAL HISTORY:  She does not smoke.



REVIEW OF SYSTEMS:  Unobtainable.



PHYSICAL EXAMINATION:

She has the above-mentioned intravenous devices and a tracheostomy in place.  Her

heart rate is in 70s to 80s, blood pressure 171/56.  Her T-max is 98.8.  She is

oliguric with urine outputs in the 300 to 600 mL a day range.  She has no apparent

air leak from either her right or left chest tube. 



LABORATORY DATA:  White count is 17.2, hemoglobin 8.2, and platelets 144,000.  BUN

is 62, creatinine 1.88.  Her chest x-ray following her procedures yesterday showed

essentially circumferential airspaces around both lungs and her initial chest x-ray

after chest tube placement showed significant improvement in the left-sided

pneumothorax and scant change in the right-sided one.  The chest x-ray from this

morning shows perhaps some slight improvement in the right-sided pneumothorax. 



IMPRESSION AND RECOMMENDATIONS:  I am going to leave the chest tubes as it is for

the time being. 







Job ID:  [id number]

## 2020-11-15 NOTE — OP
DATE OF PROCEDURE:  11/14/2020



PREOPERATIVE DIAGNOSES:  

1. Bilateral pneumothoraces, left worse than right.

2. History of prior pneumothoraces.

3. COVID pneumonia.

4. Respiratory failure, ventilator dependent.



POSTOPERATIVE DIAGNOSES:  

1. Bilateral pneumothoraces, left worse than right.

2. History of prior pneumothoraces.

3. COVID pneumonia.

4. Respiratory failure, ventilator dependent.



PROCEDURE PERFORMED:  Left tube thoracostomy 36-Burmese.  Subsequent placement of

28-Burmese right tube thoracostomy due to appreciated possible worsening

pneumothorax, right chest from earlier this morning.  Note, prior history of

bilateral chest tubes, recently removed and no intervening chest x-ray to compare.

Note, right chest tube placement was difficult and required a second incision more

cephalad anterior midaxillary line. 



ANESTHESIA:  Ventilator-dependent sedation.



DESCRIPTION OF PROCEDURE:  With the patient at bedside in ICU, left chest was

prepared with ChloraPrep and draped in routine fashion.  Incision was made in the

midaxillary line in left chest at 6th intercostal space and a subcutaneous tunnel

was created over the top of the rib, in between the ribs.  Instrument was placed.

Pleural fluid and air escaped and a chest tube placed and then secured with 2

sutures of 0 silk and sterile dressings applied.  Chest x-ray revealed good

placement, resolution of previous pneumothorax.  However, the right side may have

been a little larger.  Thus, a right chest tube was placed subsequently. 



A right tube thoracostomy placed on the right separate from the old incision

inframammary, slightly more posterior. Once I entered the intercostal space, I could

not be definite that I was in the chest.  There seemed to be some loculations.

Thus, 

another incision was made more cephalad in midaxillary line and another tunnel

created with blunt dissection, intercostal space entered more cephalad and a tube

placed.  Chest x-ray confirmed placement and improvement in the pneumothorax.  The

patient tolerated the procedure well. Sterile dressings applied. 







Job ID:  [id number]

## 2020-11-15 NOTE — OP
DATE OF PROCEDURE:  11/14/2020



PREOPERATIVE DIAGNOSES:  Respiratory failure, COVID pneumonia, acute renal failure

in need of dialysis access, poor intravenous access, malnutrition, dysphagia. 



POSTOPERATIVE DIAGNOSES:  Respiratory failure, COVID pneumonia, acute renal failure

in need of dialysis access, poor intravenous access, malnutrition, dysphagia. 



PROCEDURES PERFORMED:  Right IJ cuffed tunneled hemodialysis catheter, left IJ

central line, triple lumen, tracheostomy tube #8 Shiley low-pressure, cuffed

percutaneous endoscopic gastrostomy tube.  Ultrasound and fluoroscopy used for

placement of central line. 



ANESTHESIA:  General.



DESCRIPTION OF PROCEDURE:  The patient was taken to the operating room where under

general anesthesia, neck and chest and upper abdomen prepared with ChloraPrep and

draped in routine fashion.  Ultrasound guidance was used to cannulate the right and

left internal jugular vein and J-wire threaded, trocar catheter was removed.  Skin

site enlarged sharply on both sides, J-wire entrance site in the neck. Stab incision

was made over the right chest for the hemodialysis catheter exit site.  Seldinger

technique used to place a triple-lumen catheter. Left IJ secured with 3-0 nylon

suture.  Each port aspirated blood, flushed with saline solution. 



On the right side, a cuffed tunneled hemodialysis catheter pre-curved, tunneler used

to tunnel the catheter, placing the fabric cuff  __________skin exit site over the

right chest securing the catheter with 3-0 nylon suture and sterile dressing

applied.  Small and medium size dilators placed with J-wire into the internal

jugular vein. Removed dilator and Peel-Away sheath, placed over the J-wire into the

superior vena cava and J-wire and dilator removed. Catheter placed through the

Peel-Away sheath.  Peel-Away sheath removed.  Platysma approximated with 4-0

Monocryl, skin with subdermal 4-0 Monocryl as catheter noted to be in good position

fluoroscopically as well as a central line.  Sterile dressings applied.  Each port

of the hemodialysis catheter aspirated blood, flushed with heparinized saline

solution 1000 units of heparin per mL, indicating volume of the port.  Sterile

dressings applied. 



Incision was made in the anterior neck above the manubrium, carried down skin,

platysma, gaining hemostasis with cautery, dividing the anterior jugular vein

between 3-0 silk ties.  Trachea identified below the cricoid, dissected free and

bilateral anterolateral strap sutures of 3-0 Prolene placed. Air-knot was tied and

secured to anterior chest wall with OpSite Mastisol.  The anterior window of the

trachea excised over 3 cartilaginous rings exposing the endotracheal tube,

withdrawn, placed a #8 Shiley low-pressure cuff under direct visualization of the

trachea, inflating the balloon, connected to the ventilator.  Tracheostomy appliance

secured to the skin after closing the skin incision on either side with 3-0 Prolene

suture.  Sterile dressing applied. 



Endoscope was placed per os under direct visualization.  Using air insufflation,

passed throughout the stomach, insufflating the stomach, noting a good gastric

lumen.  Indentation of left subxiphoid subcostal medially.  A stab incision was made

and trocar catheter introduced, visualized endoscopically within the gastric lumen

__________ snare grasping the wire introduced through the trocar catheter and

pulling the endoscope and wire out through the mouth noting normal esophagus and

stomach otherwise.  Feeding tube lubricated and connected to the wire and pulled

back down the esophagus, fixated against the gastric wall with fixation device to

the anterior abdominal wall after placing 2 x 2 and antibiotic ointment.  Tube

tailored to length and feeding device secured. The patient tolerated the procedure

well. 







Job ID:  [id number]

## 2020-11-15 NOTE — RAD
SINGLE VIEW CHEST:

 

INDICATIONS:

History of intubation and chest tubes.

 

COMPARISON:

11/14/2020

 

FINDINGS:

There is persistent small bilateral pneumothoraces. There are bilateral thoracostomy tubes. Tracheost
mert tube, left IJ central venous catheter and right IJ dialysis catheter are stable appearing. Bilate
ral air space disease persists. Pneumomediastinum and chest wall emphysema are similar appearing.

 

IMPRESSION:

Stable examination.

 

POS: [facility]

## 2020-11-15 NOTE — RAD
Chest AP view



INDICATION: Pneumothorax



COMPARISON: November 15, 2020 6:22 AM



FINDINGS:



Lungs:  Bilateral airspace disease is stable.



Cardiac silhouette:  Mild cardiac megaly is stable.



Pulmonary vasculature:  Normal



Pleural spaces:  There are small bilateral apical pneumothoraces. The left-sided pneumothorax appears
 slightly more prominent than on the prior. Bilateral thoracostomy tubes are unchanged in position.



Upper abdomen:  Gastrostomy tube is seen within the left upper quadrant of the abdomen.



Osseous structures:  No acute osseous abnormality.



Additional findings:  Right dialysis central venous catheter, left IJ central venous catheter and tra
cheostomy tube are unchanged. Chest wall emphysema is stable.



IMPRESSION: 

Slight increased prominence of the left apical pneumothorax, still small in size. Stable small right 
pneumothorax. Stable bilateral thoracostomy tubes. Stable bilateral pneumonia. Stable tubes and

lines. Stable chest wall emphysema.



 



Reported By: Ramirez Olvera 

Electronically Signed:  11/15/2020 9:43 AM

## 2020-11-16 LAB
ANION GAP SERPL CALC-SCNC: 17 MMOL/L (ref 10–20)
BUN SERPL-MCNC: 78 MG/DL (ref 9.8–20.1)
CALCIUM SERPL-MCNC: 6.3 MG/DL (ref 7.8–10.44)
CHLORIDE SERPL-SCNC: 100 MMOL/L (ref 98–107)
CO2 SERPL-SCNC: 21 MMOL/L (ref 23–31)
CREAT CL PREDICTED SERPL C-G-VRATE: 28 ML/MIN (ref 70–130)
CRP SERPL-MCNC: 28.05 MG/DL
GLUCOSE SERPL-MCNC: 227 MG/DL (ref 80–115)
HGB BLD-MCNC: 6.2 G/DL (ref 12–16)
MCH RBC QN AUTO: 31.1 PG (ref 27–31)
MCV RBC AUTO: 91.4 FL (ref 78–98)
MDIFF COMPLETE?: YES
PLATELET # BLD AUTO: 95 THOU/UL (ref 130–400)
POTASSIUM SERPL-SCNC: 3.2 MMOL/L (ref 3.5–5.1)
RBC # BLD AUTO: 2 MILL/UL (ref 4.2–5.4)
SODIUM SERPL-SCNC: 135 MMOL/L (ref 136–145)
WBC # BLD AUTO: 16.6 THOU/UL (ref 4.8–10.8)

## 2020-11-16 RX ADMIN — Medication SCH UNITS: at 08:22

## 2020-11-16 RX ADMIN — INSULIN GLARGINE SCH MLS: 100 INJECTION, SOLUTION SUBCUTANEOUS at 20:27

## 2020-11-16 RX ADMIN — GUAIFENESIN SCH MG: 200 SOLUTION ORAL at 14:38

## 2020-11-16 RX ADMIN — Medication SCH ML: at 20:38

## 2020-11-16 RX ADMIN — GUAIFENESIN SCH MG: 200 SOLUTION ORAL at 05:01

## 2020-11-16 RX ADMIN — INSULIN LISPRO PRN UNIT: 100 INJECTION, SOLUTION INTRAVENOUS; SUBCUTANEOUS at 04:55

## 2020-11-16 RX ADMIN — GUAIFENESIN SCH MG: 200 SOLUTION ORAL at 00:18

## 2020-11-16 RX ADMIN — DORZOLAMIDE HYDROCHLORIDE AND TIMOLOL MALEATE SCH EACH: 22.3; 6.8 SOLUTION/ DROPS OPHTHALMIC at 08:17

## 2020-11-16 RX ADMIN — Medication SCH ML: at 09:46

## 2020-11-16 RX ADMIN — Medication SCH MG: at 20:26

## 2020-11-16 RX ADMIN — GUAIFENESIN SCH MG: 200 SOLUTION ORAL at 18:09

## 2020-11-16 RX ADMIN — Medication SCH MG: at 08:15

## 2020-11-16 RX ADMIN — INSULIN LISPRO PRN UNIT: 100 INJECTION, SOLUTION INTRAVENOUS; SUBCUTANEOUS at 16:17

## 2020-11-16 RX ADMIN — GUAIFENESIN SCH MG: 200 SOLUTION ORAL at 08:14

## 2020-11-16 RX ADMIN — BRIMONIDINE TARTRATE SCH DROP: 2 SOLUTION OPHTHALMIC at 08:17

## 2020-11-16 RX ADMIN — DORZOLAMIDE HYDROCHLORIDE AND TIMOLOL MALEATE SCH EACH: 22.3; 6.8 SOLUTION/ DROPS OPHTHALMIC at 16:18

## 2020-11-16 RX ADMIN — POLYETHYLENE GLYCOL 400 AND PROPYLENE GLYCOL SCH EACH: 4; 3 SOLUTION/ DROPS OPHTHALMIC at 08:17

## 2020-11-16 RX ADMIN — INSULIN LISPRO PRN UNIT: 100 INJECTION, SOLUTION INTRAVENOUS; SUBCUTANEOUS at 22:21

## 2020-11-16 RX ADMIN — DORZOLAMIDE HYDROCHLORIDE AND TIMOLOL MALEATE SCH EACH: 22.3; 6.8 SOLUTION/ DROPS OPHTHALMIC at 20:35

## 2020-11-16 RX ADMIN — BRIMONIDINE TARTRATE SCH DROP: 2 SOLUTION OPHTHALMIC at 20:35

## 2020-11-16 RX ADMIN — POLYETHYLENE GLYCOL 400 AND PROPYLENE GLYCOL SCH EACH: 4; 3 SOLUTION/ DROPS OPHTHALMIC at 20:35

## 2020-11-16 RX ADMIN — GUAIFENESIN SCH MG: 200 SOLUTION ORAL at 20:26

## 2020-11-16 NOTE — PRG
DATE OF SERVICE:  11/16/2020



35 minutes of critical care time.



SUBJECTIVE:  Ms. Sanches remains on mechanical ventilation through tracheostomy tube.

 She has bilateral chest tubes in place.  She has air leak on the right side,

__________ on the left.  Neurologically, she is encephalopathic and will not wake up

and follow commands despite not being on any sedation. 



OBJECTIVE:  VITAL SIGNS:  Temperature 98.6, pulse 76, blood pressure 204/54, O2

saturation 96%.  24-hour intake was 859, output 521.  She is a dialysis patient and

is scheduled for dialysis later today. 

HEENT:  Unchanged. 

NECK:  No JVD.  Trach in good position. 

LUNGS:  Coarse rhonchi. 

CARDIAC:  S1, S2.  Regular. 

ABDOMEN:  PEG tube noted. 

EXTREMITIES:  Edematous throughout.



LABORATORY DATA:  White blood cell count 16, __________, and platelet count 95.

Sodium 135, potassium 3.2, chloride 100, CO2 of 21, BUN 78, creatinine 2.4, glucose

221.  Ferritin 1090.  C-reactive protein 28. 



ASSESSMENT:  

1. COVID-19 pneumonia.

2. Acute respiratory failure, requiring mechanical ventilation.

3. Renal failure.

4. Encephalopathy.

5. Bilateral pneumothoraces.

6. Significant anemia.



PLAN:  

1. The patient does not appear to be improving despite aggressive care.  I think her 

odds of surviving COVID-19 illness are extremely low.  We are continued with

supportive care with mechanical ventilation.  We will discuss with the nurse about

transfusion. 

2. Dialysis scheduled for today.







Job ID:  [id number]

## 2020-11-16 NOTE — PRG
DATE OF SERVICE:  



SUBJECTIVE:  Ms. Sanches today is doing well after tracheostomy, PEG, and

hemodialysis catheter placement.  Tracheostomy site is dry.  PEG tube feedings are

doing well.  Her abdomen is soft and nontender.  Patient has bilateral chest tubes

and Dr. Dwain Hilario is managing those.  At this point, I will see her as needed in

this hospitalization. 







Job ID:  [id number]

## 2020-11-16 NOTE — PRG
DATE OF SERVICE:  



SUBJECTIVE:  Ms. Sanches is a 65-year-old  female, being followed by the

Renal Service for acute kidney injury on top of her chronic renal failure.  She 
was

initially admitted for COVID-19 pneumonia and still currently on ventilator 
support.

 In the interim, she has had the tunneled dialysis catheter placed as well as a

tracheostomy.  No acute events.  She was noted to be severely anemic today. 



OBJECTIVE:  VITAL SIGNS:  Blood pressure 176/50, heart rate 75, respiratory rate
27,

O2 saturation 96%. 

GENERAL:  The patient is unresponsive, on vent support. 

HEENT:  Pale conjunctivae.  Anicteric sclerae. 

NECK:  No neck mass.  No carotid bruits.  No JVD.  Positive for tracheostomy. 

LUNGS:  Decreased breath sounds. 

HEART:  Normal sinus rhythm.  No murmur.  No gallops.  No rubs. 

ABDOMEN:  Globular, soft, nontender.  No masses.  Positive for PEG tube. 

EXTREMITIES:  No edema.  No deformities.



MEDICATIONS:  Medications of November 16, 2020, was reviewed.



LABORATORY DATA:  Laboratories of November 16, 2020; white count 16.6, 
hemoglobin

6.2.  Sodium 135, potassium 3.2, chloride 100, carbon dioxide 21, BUN 78, 
creatinine

2.49, glucose 227, calcium 6.3. 



ASSESSMENT AND PLAN:  

1. Acute kidney injury/chronic renal failure.  Continue supportive care.  We 
will

try to decrease dialysis frequency with this patient from every other day to at

least 3 times a week.  I have scheduled her for hemodialysis in a.m. 

2. Anemia-agree with planned blood transfusion with this patient.

3. Mild hypocalcemia.  Continue supportive care.  Adjust calcium bath in the 
dialysis.

4. Mild hypokalemia, adjusting potassium bath with each dialysis.

5. COVID-19 pneumonia/acute respiratory failure, still on vent support.  
Overall,

prognosis remains guarded with this patient. 







Job ID:  [id number]



[facility]

## 2020-11-16 NOTE — RAD
XR Chest 1 View Portable



History: Ventilated patient



Comparison: Radiograph prior day



Findings: Subcutaneous emphysema is similar. Right thoracostomy tube is similar. Tracheostomy tube is
 similar. Left IJ central venous catheter tip projects over the cavoatrial junction. Dialysis

catheter tip projects over the right atrium.



Pneumomediastinum is present. Extensive airspace consolidation is unchanged.



No acute osseous abnormality.



Impression: Similar examination of the chest.



Reported By: Mauricio Munoz 

Electronically Signed:  11/16/2020 8:03 AM

## 2020-11-17 ENCOUNTER — HOSPITAL ENCOUNTER (INPATIENT)
Dept: HOSPITAL 92 - CCU | Age: 65
DRG: 951 | End: 2020-11-17
Attending: FAMILY MEDICINE | Admitting: FAMILY MEDICINE
Payer: COMMERCIAL

## 2020-11-17 VITALS — TEMPERATURE: 98.1 F

## 2020-11-17 VITALS — SYSTOLIC BLOOD PRESSURE: 88 MMHG | DIASTOLIC BLOOD PRESSURE: 45 MMHG

## 2020-11-17 DIAGNOSIS — Z79.899: ICD-10-CM

## 2020-11-17 DIAGNOSIS — B96.1: ICD-10-CM

## 2020-11-17 DIAGNOSIS — Z51.5: Primary | ICD-10-CM

## 2020-11-17 DIAGNOSIS — I11.0: ICD-10-CM

## 2020-11-17 DIAGNOSIS — J12.89: ICD-10-CM

## 2020-11-17 DIAGNOSIS — Z79.890: ICD-10-CM

## 2020-11-17 DIAGNOSIS — G89.29: ICD-10-CM

## 2020-11-17 DIAGNOSIS — Z79.84: ICD-10-CM

## 2020-11-17 DIAGNOSIS — I50.9: ICD-10-CM

## 2020-11-17 DIAGNOSIS — U07.1: ICD-10-CM

## 2020-11-17 DIAGNOSIS — J96.01: ICD-10-CM

## 2020-11-17 DIAGNOSIS — E03.9: ICD-10-CM

## 2020-11-17 DIAGNOSIS — Z90.49: ICD-10-CM

## 2020-11-17 DIAGNOSIS — M54.9: ICD-10-CM

## 2020-11-17 DIAGNOSIS — Z66: ICD-10-CM

## 2020-11-17 DIAGNOSIS — D64.9: ICD-10-CM

## 2020-11-17 DIAGNOSIS — E11.22: ICD-10-CM

## 2020-11-17 LAB
ANION GAP SERPL CALC-SCNC: 20 MMOL/L (ref 10–20)
BUN SERPL-MCNC: 93 MG/DL (ref 9.8–20.1)
CALCIUM SERPL-MCNC: 6.6 MG/DL (ref 7.8–10.44)
CHLORIDE SERPL-SCNC: 100 MMOL/L (ref 98–107)
CO2 SERPL-SCNC: 19 MMOL/L (ref 23–31)
CREAT CL PREDICTED SERPL C-G-VRATE: 27 ML/MIN (ref 70–130)
CRP SERPL-MCNC: 31.61 MG/DL
GLUCOSE SERPL-MCNC: 291 MG/DL (ref 80–115)
HGB BLD-MCNC: 10 G/DL (ref 12–16)
MCH RBC QN AUTO: 31.5 PG (ref 27–31)
MCV RBC AUTO: 89.6 FL (ref 78–98)
MDIFF COMPLETE?: YES
PLATELET # BLD AUTO: 75 THOU/UL (ref 130–400)
POTASSIUM SERPL-SCNC: 2.8 MMOL/L (ref 3.5–5.1)
RBC # BLD AUTO: 3.16 MILL/UL (ref 4.2–5.4)
SODIUM SERPL-SCNC: 136 MMOL/L (ref 136–145)
WBC # BLD AUTO: 18.7 THOU/UL (ref 4.8–10.8)

## 2020-11-17 RX ADMIN — Medication SCH UNITS: at 08:38

## 2020-11-17 RX ADMIN — GUAIFENESIN SCH MG: 200 SOLUTION ORAL at 08:32

## 2020-11-17 RX ADMIN — INSULIN LISPRO PRN UNIT: 100 INJECTION, SOLUTION INTRAVENOUS; SUBCUTANEOUS at 05:14

## 2020-11-17 RX ADMIN — Medication SCH MG: at 08:31

## 2020-11-17 RX ADMIN — BRIMONIDINE TARTRATE SCH DROP: 2 SOLUTION OPHTHALMIC at 08:40

## 2020-11-17 RX ADMIN — DORZOLAMIDE HYDROCHLORIDE AND TIMOLOL MALEATE SCH EACH: 22.3; 6.8 SOLUTION/ DROPS OPHTHALMIC at 08:41

## 2020-11-17 RX ADMIN — GUAIFENESIN SCH MG: 200 SOLUTION ORAL at 02:02

## 2020-11-17 RX ADMIN — GUAIFENESIN SCH MG: 200 SOLUTION ORAL at 05:14

## 2020-11-17 RX ADMIN — GUAIFENESIN SCH: 200 SOLUTION ORAL at 16:11

## 2020-11-17 RX ADMIN — POLYETHYLENE GLYCOL 400 AND PROPYLENE GLYCOL SCH EACH: 4; 3 SOLUTION/ DROPS OPHTHALMIC at 08:40

## 2020-11-17 NOTE — PRG
DATE OF SERVICE:  11/17/2020



Ms. Sanches is 36 days into her admission. 



This morning, she looked terrible.  Her sats were in the 70s.  Her blood pressures

in the 80s on dialysis.  She is no more alert than she was when I saw her on Friday. 



I contacted family by phone and explained to them that I expect her not to survive

24 hours __________ I answered all their questions to their satisfaction. 







Job ID:  [id number]

## 2020-11-17 NOTE — RAD
CHEST 1 VIEW:

 

Date:  11/17/2020

 

INDICATION:

History of chest tubes and intubation. 

 

COMPARISON:  

Prior exam dated 11/16/2020. 

 

FINDINGS:

Bilateral thoracostomy tubes, right IJ dialysis catheter, and tracheostomy tube are similar appearing
. Left IJ central venous catheter is similar appearing. Diffuse air space disease is similar appearin
g. Small right-sided pneumothorax is again seen. Pneumomediastinum persists. Subcutaneous emphysema i
s similar appearing. No left-sided pneumothorax is evident. 

 

IMPRESSION: 

1.  Stable bilateral air space disease suspicious for pneumonia. 

2.  Persistent small right-sided apical pneumothorax. Left-sided pneumothorax is not as well seen and
 may have intervally resolved. 

3.  Slowly improving chest wall emphysema and pneumomediastinum. 

4.  Stable tubes and lines. 

 

 

POS: [facility]

## 2020-11-17 NOTE — PDOC.PALPN
Palliative Progress Note





- Subjective





Mechanical ventilation, hemoglobin dropped, patient continues to decline





- Objective


Vital Signs: 


                            Vital Signs - Most Recent











Temp Pulse Resp BP Pulse Ox


 


 98.7 F   67   27 H  88/45 L  93 L


 


 11/17/20 04:00  11/17/20 10:37  11/17/20 10:00  11/17/20 10:37  11/17/20 08:00














- Physical Exam


Constitutional: encephalitic, ill appearing


HEENT: moist MMs


Respiratory: no wheezing, diminished lung sound


Deviation from normal: diminished to bases, mechanical ventilation


Deviation from normal: Obese


Genitourinary: fine catheter


Skin: bruising, fragile


Deviation from normal: Encephalopathic





- Assessment


(1) Palliative care encounter


Code(s): Z51.5 - ENCOUNTER FOR PALLIATIVE CARE   Current Visit: Yes   Status: 

Acute   





(2) Acute respiratory failure with hypoxia


Code(s): J96.01 - ACUTE RESPIRATORY FAILURE WITH HYPOXIA   Current Visit: Yes   

Status: Acute   





(3) Pneumonia due to COVID-19 virus


Code(s): U07.1 - COVID-19; J12.89 - OTHER VIRAL PNEUMONIA   Current Visit: Yes  

Status: Acute   





(4) Thrombocytopenia


Code(s): D69.6 - THROMBOCYTOPENIA, UNSPECIFIED   Current Visit: Yes   Status: 

Acute   





(5) Acute on chronic kidney failure


Code(s): N17.9 - ACUTE KIDNEY FAILURE, UNSPECIFIED; N18.9 - CHRONIC KIDNEY 

DISEASE, UNSPECIFIED   Current Visit: No   Status: Acute   





- Plan


Plan:


Palliative Care has met with family and Surrogate decision maker Mindy. Withdraw

of care.


[facility] elected as per choice of Mindy. Complicated Grief


Father Nadir called to bedside to offer spiritual support.





Will [facility] prior to extubation to assist with comfort measures and 

facilitate end of life. As well as follow family for complicated grief for the 

next year. 





Please also refer to YOLANDA Garcia RN notes in note section











[20] minutes spent on this encounter with >50% of the time in counseling and 

coordination of care.














- ROS


Non Response: due to endotracheal tube, due to mental status

## 2020-11-17 NOTE — PDOC.HOSPP
- Subjective


Encounter Date: 10/17/20


Subjective: 


The patient stated that she is feeling better.


Complains of dry cough.





- Objective


Vital Signs & Weight: 


                             Vital Signs (12 hours)











  Temp Pulse Pulse Ox


 


 10/17/20 15:10   73 


 


 10/17/20 12:00  98.2 F  


 


 10/17/20 08:30   73 


 


 10/17/20 08:27   73 


 


 10/17/20 08:00  98.2 F   96


 


 10/17/20 04:00  98.2 F  








                                     Weight











Admit Weight                   176 lb 8 oz


 


Weight                         178 lb 6.4 oz











                            Most Recent Monitor Data











Heart Rate from ECG            70


 


NIBP                           144/76


 


NIBP BP-Mean                   98


 


Respiration from ECG           34


 


SpO2                           95














I&O: 


                                        











 10/16/20 10/17/20 10/18/20





 06:59 06:59 06:59


 


Intake Total 1610 2360 


 


Output Total 1200 1350 


 


Balance 410 1010 











Result Diagrams: 


                                 10/17/20 03:22





                                 10/17/20 03:22


Additional Labs: 


                                   Accuchecks











  10/17/20 10/17/20 10/16/20





  10:35 05:13 20:49


 


POC Glucose  107 H  222 H  205 H














  10/16/20





  18:31


 


POC Glucose  113 H














Hospitalist ROS





- Medication


Medications: 


Active Medications











Generic Name Dose Route Start Last Admin





  Trade Name Freq  PRN Reason Stop Dose Admin


 


Acetaminophen  650 mg  10/12/20 14:11  10/17/20 00:16





  Acetaminophen 325 Mg Tab  PO   650 mg





  Q4H PRN   Administration





  Headache/Fever/Mild Pain (1-3)  


 


Amlodipine Besylate  5 mg  10/13/20 09:00  10/17/20 08:27





  Amlodipine 5 Mg Tab  PO   5 mg





  BID SAIDA   Administration


 


Ascorbic Acid  1,000 mg  10/15/20 21:00  10/17/20 08:27





  Ascorbic Acid 500 Mg Chewable Tablet  PO   1,000 mg





  BID SAIDA   Administration


 


Benzonatate  100 mg  10/13/20 09:28  10/14/20 08:40





  Benzonatate 100 Mg Cap  PO   100 mg





  Q4H PRN   Administration





  Cough  


 


Brimonidine Tartrate  1 drop  10/13/20 09:00  10/17/20 08:28





  Brimonidine Tartrate 0.2% Ophth Soln 5 Ml Bottle  R EYE   1 drop





  BID SAIDA   Administration


 


Calcitriol  0.25 mcg  10/13/20 09:00  10/17/20 08:28





  Calcitriol 0.25 Mcg Cap  PO   0.25 mcg





  DAILY SAIDA   Administration


 


Cholecalciferol  1,000 units  10/13/20 09:00  10/17/20 08:29





  Cholecalciferol 1,000 Units (25 Mcg) Tab  PO   1,000 units





  DAILY SAIDA   Administration


 


Cyclobenzaprine HCl  10 mg  10/16/20 12:19  10/17/20 11:26





  Cyclobenzaprine 10 Mg Tab  PO   10 mg





  TID PRN   Administration





  Muscle Spasm  


 


Dextrose/Water  25 gm  10/12/20 14:15  10/16/20 11:15





  Dextrose 50% Abboject 50 Ml Syringe  SLOW IVP   25 gm





  PRN PRN   Administration





  Hypoglycemia  


 


Dorzolamide/Timolol  1 drop  10/13/20 09:00  10/17/20 15:12





  Dorzolamide/Timolol 2%/0.5% Ophth Soln 10 Ml Bottle  EA EYE   1 each





  TID SAIDA   Administration


 


Enoxaparin Sodium  30 mg  10/13/20 09:00  10/17/20 08:29





  Enoxaparin Sodium 30 Mg/0.3 Ml Syringe  SC   30 mg





  0900 SAIDA   Administration


 


Fish Oil  3,000 mg  10/13/20 09:00  10/17/20 08:29





  Fish Oil 1,000 Mg Cap  PO   3,000 mg





  DAILY SAIDA   Administration


 


Glipizide  5 mg  10/13/20 09:00  10/17/20 08:30





  Glipizide Xl 5 Mg Tablet  PO   5 mg





  BID SAIDA   Administration


 


Guaifenesin  600 mg  10/13/20 21:00  10/17/20 08:30





  Guaifenesin Er 600 Mg Tab  PO   600 mg





  Q12HR SAIDA   Administration


 


Hydralazine HCl  50 mg  10/13/20 09:00  10/17/20 15:10





  Hydralazine 25 Mg Tab  PO   50 mg





  TID SAIDA   Administration


 


Insulin Glargine 45 units/  0.45 mls @ 0 mls/hr  10/13/20 09:00  10/17/20 08:30





  Miscellaneous Medication  SC   0.45 mls





  BID SAIDA   Administration


 


Azithromycin 500 mg/ Sodium  250 mls @ 250 mls/hr  10/14/20 10:00  10/17/20 

10:31





  Chloride  IVPB   250 mls





  Q24HR SAIDA   Administration


 


Cefepime HCl 1 gm/ Sodium  100 mls @ 200 mls/hr  10/14/20 21:00  10/17/20 08:28





  Chloride  IVPB   100 mls





  Q12HR SAIDA   Administration


 


Insulin Human Lispro  0 units  10/12/20 14:15  10/17/20 05:08





  Humalog 300 Units/3 Ml Vial  SC   3 unit





  .MILD SLIDING SCALE PRN   Administration





  Mild Correctional Scale  


 


Insulin Human Lispro  0 units  10/12/20 21:21  10/14/20 21:58





  Humalog 300 Units/3 Ml Vial  SC   2 unit





  .BEDTIME SLIDING SC PRN   Administration





  Bedtime Correctional Scale  


 


Labetalol HCl  10 mg  10/12/20 14:18  10/14/20 01:50





  Labetalol Hcl 100 Mg/20 Ml Vial  SLOW IVP   2 ml





  Q4H PRN   Administration





  SBP Greater Than 180  


 


Levothyroxine Sodium  25 mcg  10/17/20 06:00  10/17/20 05:08





  Levothyroxine Sodium 25 Mcg Tab  PO   25 mcg





  0600 SAIDA   Administration


 


Melatonin  3 mg  10/14/20 02:12  10/15/20 23:49





  Melatonin 3 Mg Tab  PO   3 mg





  HSPRN PRN   Administration





  Insomnia  


 


Metoprolol Tartrate  50 mg  10/13/20 09:00  10/17/20 08:30





  Metoprolol Tartrate 50 Mg Tab  PO   50 mg





  BID SAIDA   Administration


 


Pantoprazole Sodium  40 mg  10/13/20 09:00  10/17/20 08:30





  Pantoprazole 40 Mg Tab  PO   40 mg





  DAILY SAIDA   Administration


 


Polyvinyl Alcohol/Povidone  1 each  10/13/20 09:00  10/17/20 08:31





  Polyvinyl Alcohol 1.4%/Povidone 0.6% Opth Drops  EA EYE   1 each





  DAILY SAIDA   Administration


 


Propylene Glycol  1 drop  10/13/20 09:00  10/17/20 08:32





  Polyethylene Glycol Opth Drop 15 Ml Bot  EA EYE   1 each





  BID SAIDA   Administration


 


Simvastatin  10 mg  10/13/20 17:00  10/16/20 18:23





  Simvastatin 10 Mg Tab  PO   10 mg





  QPM-WM SAIDA   Administration


 


Sodium Bicarbonate  650 mg  10/16/20 15:00  10/17/20 15:10





  Sodium Bicarbonate Tab 325 Mg Tab  PO   650 mg





  TID SAIDA   Administration


 


Sodium Chloride  10 ml  10/13/20 09:00  10/17/20 08:31





  Flush - Normal Saline 10 Ml Syringe  IVF   10 ml





  Q12HR SAIDA   Administration


 


Zinc Sulfate  220 mg  10/16/20 09:00  10/17/20 08:31





  Zinc Sulfate 220 Mg Cap  PO   220 mg





  DAILY SAIDA   Administration














- Exam


General Appearance: awake alert


ENT: normocephalic atraumatic


Neck: supple, no JVD


Heart: RRR, no murmur, no gallops, no rubs


Respiratory: normal chest expansion, no tachypnea, rhonchi


Neurological: cranial nerve grossly intact, no focal deficits





[facility]osp [facility]





- Plan





10/16: 


Patient is a pleasant 64 years old female with multiple comorbidities including 

diabetes type 2, hypertension, chronic systolic heart failure, hypertension who 

was diagnosed with Covid approximately 3 days ago, presented to ED with 

worsening dyspnea.





Acute hypoxic respiratory failure secondary to COVID-19


--comfortable on HIFLo, wean as tolerate. s/p convalescent plasma. cont Decadr

on, Vit C/D/Zinc 


--Not a candidate for Remdesivir d/t renal function. Appreciate ID input


--consider consult Pulmonology if she further de-compensates 


--cont IV Lasix given even evidence of Pul edema, rpt CXR improved. 


--Check Echo when able.  


--cont supportive cares. Follow AM labs





COVID-19 PNA


--mgt above. 





RAMONA on CKD stage III


--Monitor, avoid nephrotoxic agent. Follow BMP. Cr stable. 





Metabolic Acidosis


--add Sodium Bicarb, follow BMP 





Diabetes type 2


--BG elevated d/t steroid. Resume Lantus, cont ISS





Hypothyroidism


--cont Levothyroxine 





Klebsiella UTI, poa


--cont IV abx as above 





DVT ppx: Lovenox


GI ppx: PPI


Code Status: Full code


Anticipated Dispo: Home when medically stable





I have updated her daughter daily, Mindy over the phone with regard to her 

change of condition.





10/17:


The patient is saturating well on high flow nasal cannula.


Continue Decadron 6 mg daily.


She received convalescent plasma but not remdesivir due to abnormal kidney 

function.


Pulmonary service consulted.


Continue IV antibiotics to cover for both pneumonia and UTI.


Continue Lovenox for DVT prophylaxis.
- Subjective


Encounter Date: 10/18/20


Subjective: 


The patient was on BiPAP last night.


She has been weaned off to high flow nasal cannula with FiO2 of 65%.


Denies chest pain.





- Objective


Vital Signs & Weight: 


                             Vital Signs (12 hours)











  Temp Pulse Pulse Ox


 


 10/18/20 13:16    96


 


 10/18/20 11:53  97.9 F  


 


 10/18/20 08:31   73 


 


 10/18/20 08:28   73 


 


 10/18/20 08:00  97.6 F   95


 


 10/18/20 06:00  98.6 F  


 


 10/18/20 04:00  98.2 F  








                                     Weight











Admit Weight                   176 lb 8 oz


 


Weight                         178 lb 6.4 oz











                            Most Recent Monitor Data











Heart Rate from ECG            65


 


NIBP                           156/60


 


NIBP BP-Mean                   92


 


Respiration from ECG           26


 


SpO2                           95














I&O: 


                                        











 10/17/20 10/18/20 10/19/20





 06:59 06:59 06:59


 


Intake Total 2360 1700 


 


Output Total 1350 1150 


 


Balance 1010 550 











Result Diagrams: 


                                 10/18/20 03:08





                                 10/18/20 03:08


Additional Labs: 


                                   Accuchecks











  10/18/20 10/18/20 10/17/20





  10:48 06:11 20:58


 


POC Glucose  139 H  80  240 H














  10/17/20





  17:37


 


POC Glucose  250 H














Hospitalist ROS





- Medication


Medications: 


Active Medications











Generic Name Dose Route Start Last Admin





  Trade Name Freq  PRN Reason Stop Dose Admin


 


Acetaminophen  650 mg  10/12/20 14:11  10/17/20 22:18





  Acetaminophen 325 Mg Tab  PO   650 mg





  Q4H PRN   Administration





  Headache/Fever/Mild Pain (1-3)  


 


Amlodipine Besylate  5 mg  10/13/20 09:00  10/18/20 08:28





  Amlodipine 5 Mg Tab  PO   5 mg





  BID SAIDA   Administration


 


Ascorbic Acid  1,000 mg  10/15/20 21:00  10/18/20 08:38





  Ascorbic Acid 500 Mg Chewable Tablet  PO   1,000 mg





  BID SAIDA   Administration


 


Benzonatate  100 mg  10/13/20 09:28  10/14/20 08:40





  Benzonatate 100 Mg Cap  PO   100 mg





  Q4H PRN   Administration





  Cough  


 


Brimonidine Tartrate  1 drop  10/13/20 09:00  10/18/20 08:29





  Brimonidine Tartrate 0.2% Ophth Soln 5 Ml Bottle  R EYE   1 drop





  BID SAIDA   Administration


 


Calcitriol  0.25 mcg  10/13/20 09:00  10/18/20 08:29





  Calcitriol 0.25 Mcg Cap  PO   0.25 mcg





  DAILY SAIDA   Administration


 


Cholecalciferol  1,000 units  10/13/20 09:00  10/18/20 08:30





  Cholecalciferol 1,000 Units (25 Mcg) Tab  PO   1,000 units





  DAILY SAIDA   Administration


 


Cyclobenzaprine HCl  10 mg  10/16/20 12:19  10/17/20 11:26





  Cyclobenzaprine 10 Mg Tab  PO   10 mg





  TID PRN   Administration





  Muscle Spasm  


 


Dextrose/Water  25 gm  10/12/20 14:15  10/16/20 11:15





  Dextrose 50% Abboject 50 Ml Syringe  SLOW IVP   25 gm





  PRN PRN   Administration





  Hypoglycemia  


 


Dorzolamide/Timolol  1 drop  10/13/20 09:00  10/18/20 08:29





  Dorzolamide/Timolol 2%/0.5% Ophth Soln 10 Ml Bottle  EA EYE   1 each





  TID SAIDA   Administration


 


Enoxaparin Sodium  30 mg  10/13/20 09:00  10/18/20 08:30





  Enoxaparin Sodium 30 Mg/0.3 Ml Syringe  SC   30 mg





  0900 SAIDA   Administration


 


Fish Oil  3,000 mg  10/13/20 09:00  10/18/20 08:30





  Fish Oil 1,000 Mg Cap  PO   3,000 mg





  DAILY SAIDA   Administration


 


Glipizide  5 mg  10/13/20 09:00  10/18/20 08:30





  Glipizide Xl 5 Mg Tablet  PO   5 mg





  BID SAIDA   Administration


 


Guaifenesin  600 mg  10/13/20 21:00  10/18/20 08:30





  Guaifenesin Er 600 Mg Tab  PO   600 mg





  Q12HR SAIDA   Administration


 


Hydralazine HCl  50 mg  10/13/20 09:00  10/18/20 08:31





  Hydralazine 25 Mg Tab  PO   50 mg





  TID SAIDA   Administration


 


Insulin Glargine 45 units/  0.45 mls @ 0 mls/hr  10/13/20 09:00  10/18/20 08:31





  Miscellaneous Medication  SC   0.45 mls





  BID SAIDA   Administration


 


Azithromycin 500 mg/ Sodium  250 mls @ 250 mls/hr  10/14/20 10:00  10/18/20 11:4

8





  Chloride  IVPB   250 mls





  Q24HR SAIDA   Administration


 


Cefepime HCl 1 gm/ Sodium  100 mls @ 200 mls/hr  10/14/20 21:00  10/18/20 08:29





  Chloride  IVPB   100 mls





  Q12HR SAIDA   Administration


 


Insulin Human Lispro  0 units  10/12/20 14:15  10/17/20 05:08





  Humalog 300 Units/3 Ml Vial  SC   3 unit





  .MILD SLIDING SCALE PRN   Administration





  Mild Correctional Scale  


 


Insulin Human Lispro  0 units  10/12/20 21:21  10/17/20 22:19





  Humalog 300 Units/3 Ml Vial  SC   2 unit





  .BEDTIME SLIDING SC PRN   Administration





  Bedtime Correctional Scale  


 


Labetalol HCl  10 mg  10/12/20 14:18  10/14/20 01:50





  Labetalol Hcl 100 Mg/20 Ml Vial  SLOW IVP   2 ml





  Q4H PRN   Administration





  SBP Greater Than 180  


 


Levothyroxine Sodium  25 mcg  10/17/20 06:00  10/18/20 06:04





  Levothyroxine Sodium 25 Mcg Tab  PO   25 mcg





  0600 SAIDA   Administration


 


Melatonin  3 mg  10/14/20 02:12  10/17/20 22:18





  Melatonin 3 Mg Tab  PO   3 mg





  HSPRN PRN   Administration





  Insomnia  


 


Methylprednisolone Sodium Succinate  40 mg  10/17/20 21:00  10/18/20 08:31





  Methylprednisolone Sod Succ 40 Mg Vial  IVP   40 mg





  Q12HR SAIDA   Administration


 


Metoprolol Tartrate  50 mg  10/13/20 09:00  10/18/20 08:31





  Metoprolol Tartrate 50 Mg Tab  PO   50 mg





  BID SAIDA   Administration


 


Pantoprazole Sodium  40 mg  10/13/20 09:00  10/18/20 08:31





  Pantoprazole 40 Mg Tab  PO   40 mg





  DAILY SAIDA   Administration


 


Polyvinyl Alcohol/Povidone  1 each  10/13/20 09:00  10/18/20 08:32





  Polyvinyl Alcohol 1.4%/Povidone 0.6% Opth Drops  EA EYE   1 each





  DAILY SAIDA   Administration


 


Propylene Glycol  1 drop  10/13/20 09:00  10/18/20 08:32





  Polyethylene Glycol Opth Drop 15 Ml Bot  EA EYE   1 each





  BID SAIDA   Administration


 


Simvastatin  10 mg  10/13/20 17:00  10/17/20 17:28





  Simvastatin 10 Mg Tab  PO   10 mg





  QPM-WM SAIDA   Administration


 


Sodium Bicarbonate  650 mg  10/16/20 15:00  10/18/20 08:32





  Sodium Bicarbonate Tab 325 Mg Tab  PO   650 mg





  TID SAIDA   Administration


 


Sodium Chloride  10 ml  10/13/20 09:00  10/18/20 08:32





  Flush - Normal Saline 10 Ml Syringe  IVF   10 ml





  Q12HR SAIDA   Administration


 


Zinc Sulfate  220 mg  10/16/20 09:00  10/18/20 08:32





  Zinc Sulfate 220 Mg Cap  PO   220 mg





  DAILY SAIDA   Administration














- Exam


General Appearance: awake alert


ENT: normocephalic atraumatic


Neck: supple


Heart: RRR


Respiratory: normal chest expansion, no tachypnea


Gastrointestinal: soft, non-tender, non-distended, normal bowel sounds


Neurological: cranial nerve grossly intact, no focal deficits





Hosp A[facility]





- Plan





10/16: 


Patient is a pleasant 64 years old female with multiple comorbidities including 

diabetes type 2, hypertension, chronic systolic heart failure, hypertension who 

was diagnosed with Covid approximately 3 days ago, presented to ED with 

worsening dyspnea.





Acute hypoxic respiratory failure secondary to COVID-19


--comfortable on HIFLo, wean as tolerate. s/p convalescent plasma. cont 

Decadron, Vit C/D/Zinc 


--Not a candidate for Remdesivir d/t renal function. Appreciate ID input


--consider consult Pulmonology if she further de-compensates 


--cont IV Lasix given even evidence of Pul edema, rpt CXR improved. 


--Check Echo when able.  


--cont supportive cares. Follow AM labs





COVID-19 PNA


--mgt above. 





RAMONA on CKD stage III


--Monitor, avoid nephrotoxic agent. Follow BMP. Cr stable. 





Metabolic Acidosis


--add Sodium Bicarb, follow BMP 





Diabetes type 2


--BG elevated d/t steroid. Resume Lantus, cont ISS





Hypothyroidism


--cont Levothyroxine 





Klebsiella UTI, poa


--cont IV abx as above 





DVT ppx: Lovenox


GI ppx: PPI


Code Status: Full code


Anticipated Dispo: Home when medically stable





I have updated her daughter daily, Mindy over the phone with regard to her 

change of condition.





10/17:


The patient is saturating well on high flow nasal cannula.


Continue Decadron 6 mg daily.


She received convalescent plasma but not remdesivir due to abnormal kidney 

function.


Pulmonary service consulted.


Continue IV antibiotics to cover for both pneumonia and UTI.


Continue Lovenox for DVT prophylaxis.





10/18:


She tolerated BiPAP overnight.


We will continue to wean off nasal cannula as tolerated.


Remains on Decadron.


Continue IV antibiotics and Lovenox.


Appreciate pulmonology.
- Subjective


Encounter Date: 10/19/20


Subjective: 


The patient was seen and examined.


She is feeling better today.


She is requiring less FiO2.





- Objective


Vital Signs & Weight: 


                             Vital Signs (12 hours)











  Temp Pulse Pulse Ox


 


 10/19/20 12:00  97.8 F  


 


 10/19/20 10:41   73 


 


 10/19/20 08:00  97.1 F L   96


 


 10/19/20 07:34    94 L


 


 10/19/20 04:13  98.3 F  








                                     Weight











Admit Weight                   176 lb 8 oz


 


Weight                         178 lb 6.4 oz











                            Most Recent Monitor Data











Heart Rate from ECG            82


 


NIBP                           191/73


 


NIBP BP-Mean                   123


 


Respiration from ECG           38


 


SpO2                           91














I&O: 


                                        











 10/18/20 10/19/20 10/20/20





 06:59 06:59 06:59


 


Intake Total 1700 1340 


 


Output Total 1150 1175 


 


Balance 550 165 











Result Diagrams: 


                                 10/19/20 03:24





                                 10/19/20 03:24


Additional Labs: 


                                   Accuchecks











  10/19/20 10/19/20 10/18/20





  10:56 06:25 21:26


 


POC Glucose  92  129 H  97














  10/18/20 10/16/20 10/16/20





  17:22 11:28 11:05


 


POC Glucose  154 H  217 H  57 L*














Hospitalist ROS





- Medication


Medications: 


Active Medications











Generic Name Dose Route Start Last Admin





  Trade Name Freq  PRN Reason Stop Dose Admin


 


Acetaminophen  650 mg  10/12/20 14:11  10/17/20 22:18





  Acetaminophen 325 Mg Tab  PO   650 mg





  Q4H PRN   Administration





  Headache/Fever/Mild Pain (1-3)  


 


Amlodipine Besylate  5 mg  10/13/20 09:00  10/19/20 10:41





  Amlodipine 5 Mg Tab  PO   5 mg





  BID SAIDA   Administration


 


Apixaban  5 mg  10/18/20 21:00  10/19/20 10:42





  Apixaban 5 Mg Tab  PO   5 mg





  BID SAIDA   Administration


 


Ascorbic Acid  1,000 mg  10/15/20 21:00  10/19/20 10:44





  Ascorbic Acid 500 Mg Chewable Tablet  PO   1,000 mg





  BID SAIDA   Administration


 


Benzonatate  100 mg  10/13/20 09:28  10/14/20 08:40





  Benzonatate 100 Mg Cap  PO   100 mg





  Q4H PRN   Administration





  Cough  


 


Brimonidine Tartrate  1 drop  10/13/20 09:00  10/19/20 10:44





  Brimonidine Tartrate 0.2% Ophth Soln 5 Ml Bottle  R EYE   1 drop





  BID SAIDA   Administration


 


Calcitriol  0.25 mcg  10/13/20 09:00  10/19/20 10:42





  Calcitriol 0.25 Mcg Cap  PO   0.25 mcg





  DAILY SAIDA   Administration


 


Cholecalciferol  1,000 units  10/13/20 09:00  10/19/20 10:41





  Cholecalciferol 1,000 Units (25 Mcg) Tab  PO   1,000 units





  DAILY SAIDA   Administration


 


Cyclobenzaprine HCl  10 mg  10/16/20 12:19  10/17/20 11:26





  Cyclobenzaprine 10 Mg Tab  PO   10 mg





  TID PRN   Administration





  Muscle Spasm  


 


Dextrose/Water  25 gm  10/12/20 14:15  10/19/20 04:23





  Dextrose 50% Abboject 50 Ml Syringe  SLOW IVP   25 gm





  PRN PRN   Administration





  Hypoglycemia  


 


Dorzolamide/Timolol  1 drop  10/13/20 09:00  10/19/20 10:44





  Dorzolamide/Timolol 2%/0.5% Ophth Soln 10 Ml Bottle  EA EYE   1 each





  TID SAIDA   Administration


 


Fish Oil  3,000 mg  10/13/20 09:00  10/19/20 10:41





  Fish Oil 1,000 Mg Cap  PO   3,000 mg





  DAILY SAIDA   Administration


 


Glipizide  5 mg  10/13/20 09:00  10/19/20 10:44





  Glipizide Xl 5 Mg Tablet  PO   Not Given





  BID SAIDA  


 


Guaifenesin  600 mg  10/13/20 21:00  10/19/20 10:41





  Guaifenesin Er 600 Mg Tab  PO   600 mg





  Q12HR SAIDA   Administration


 


Hydralazine HCl  50 mg  10/13/20 09:00  10/19/20 10:41





  Hydralazine 25 Mg Tab  PO   50 mg





  TID SAIDA   Administration


 


Insulin Glargine 45 units/  0.45 mls @ 0 mls/hr  10/13/20 09:00  10/19/20 10:45





  Miscellaneous Medication  SC   Not Given





  BID SAIDA  


 


Azithromycin 500 mg/ Sodium  250 mls @ 250 mls/hr  10/14/20 10:00  10/19/20 1

0:43





  Chloride  IVPB   250 mls





  Q24HR SAIDA   Administration


 


Cefepime HCl 1 gm/ Sodium  100 mls @ 200 mls/hr  10/14/20 21:00  10/19/20 10:39





  Chloride  IVPB   100 mls





  Q12HR SAIDA   Administration


 


Insulin Human Lispro  0 units  10/12/20 14:15  10/17/20 05:08





  Humalog 300 Units/3 Ml Vial  SC   3 unit





  .MILD SLIDING SCALE PRN   Administration





  Mild Correctional Scale  


 


Insulin Human Lispro  0 units  10/12/20 21:21  10/17/20 22:19





  Humalog 300 Units/3 Ml Vial  SC   2 unit





  .BEDTIME SLIDING SC PRN   Administration





  Bedtime Correctional Scale  


 


Labetalol HCl  10 mg  10/12/20 14:18  10/14/20 01:50





  Labetalol Hcl 100 Mg/20 Ml Vial  SLOW IVP   2 ml





  Q4H PRN   Administration





  SBP Greater Than 180  


 


Levothyroxine Sodium  25 mcg  10/17/20 06:00  10/19/20 06:30





  Levothyroxine Sodium 25 Mcg Tab  PO   25 mcg





  0600 SAIDA   Administration


 


Melatonin  3 mg  10/14/20 02:12  10/17/20 22:18





  Melatonin 3 Mg Tab  PO   3 mg





  HSPRN PRN   Administration





  Insomnia  


 


Melatonin  3 mg  10/16/20 15:40  10/19/20 00:13





  Melatonin 3 Mg Tab  PO   3 mg





  HS PRN   Administration





  Insomnia  


 


Methylprednisolone Sodium Succinate  40 mg  10/17/20 21:00  10/19/20 10:42





  Methylprednisolone Sod Succ 40 Mg Vial  IVP   40 mg





  Q12HR SAIDA   Administration


 


Metoprolol Tartrate  50 mg  10/13/20 09:00  10/19/20 10:44





  Metoprolol Tartrate 50 Mg Tab  PO   50 mg





  BID SAIDA   Administration


 


Pantoprazole Sodium  40 mg  10/13/20 09:00  10/19/20 10:42





  Pantoprazole 40 Mg Tab  PO   40 mg





  DAILY SAIDA   Administration


 


Polyvinyl Alcohol/Povidone  1 each  10/13/20 09:00  10/19/20 10:40





  Polyvinyl Alcohol 1.4%/Povidone 0.6% Opth Drops  EA EYE   1 each





  DAILY SAIDA   Administration


 


Propylene Glycol  1 drop  10/13/20 09:00  10/19/20 10:45





  Polyethylene Glycol Opth Drop 15 Ml Bot  EA EYE   1 each





  BID SAIDA   Administration


 


Simvastatin  10 mg  10/13/20 17:00  10/18/20 17:14





  Simvastatin 10 Mg Tab  PO   10 mg





  QPM-WM SAIDA   Administration


 


Sodium Bicarbonate  650 mg  10/16/20 15:00  10/19/20 10:40





  Sodium Bicarbonate Tab 325 Mg Tab  PO   650 mg





  TID SAIDA   Administration


 


Sodium Chloride  10 ml  10/13/20 09:00  10/19/20 10:43





  Flush - Normal Saline 10 Ml Syringe  IVF   10 ml





  Q12HR SAIDA   Administration


 


Throat Lozenges  1 gloria  10/18/20 23:28  10/19/20 00:13





  Cepastat Lozenges 1 Gloria  PO   1 gloria





  Q2H PRN   Administration





  Sore Throat  


 


Zinc Sulfate  220 mg  10/16/20 09:00  10/19/20 10:43





  Zinc Sulfate 220 Mg Cap  PO   220 mg





  DAILY SAIDA   Administration














- Exam


General Appearance: awake alert


ENT: normocephalic atraumatic


Neck: supple, no JVD


Heart: RRR


Respiratory: CTAB, normal chest expansion, no tachypnea


Gastrointestinal: soft, non-tender, non-distended, normal bowel sounds


Neurological: cranial nerve grossly intact, no new deficit





[facility]





- Plan








Patient is a pleasant 64 years old female with multiple comorbidities including 

diabetes type 2, hypertension, chronic systolic heart failure, hypertension who 

was diagnosed with Covid approximately 3 days ago, presented to ED with 

worsening dyspnea.





Acute hypoxic respiratory failure secondary to COVID-19


--comfortable on HIFLo, wean as tolerate. s/p convalescent plasma. cont 

Decadron, Vit C/D/Zinc 


--Not a candidate for Remdesivir d/t renal function. Appreciate ID input


Continue corticosteroids and supplemental oxygen as needed.


Her FiO2 is 60% today.


Eliquis started for anticoagulation.





COVID-19 PNA


--mgt above. 





RAMONA on CKD stage III


--Monitor, avoid nephrotoxic agent. Follow BMP. Cr stable. 





Metabolic Acidosis


--add Sodium Bicarb, follow BMP 





Diabetes type 2


--BG elevated d/t steroid. Resume Lantus, cont ISS





Hypothyroidism


--cont Levothyroxine 





Klebsiella UTI, poa


--cont IV abx as above 





DVT ppx: Lovenox


GI ppx: PPI


Code Status: Full code


Anticipated Dispo: Home when medically stable





I have updated her daughter daily, Mindy over the phone with regard to her 

change of condition.
- Subjective


Encounter Date: 10/20/20


Subjective: 


The patient is requiring more oxygen today.





- Objective


Vital Signs & Weight: 


                             Vital Signs (12 hours)











  Temp Pulse Pulse Pulse BP BP Pulse Ox


 


 10/20/20 12:00  98.0 F      


 


 10/20/20 10:30    85  74  163/86 H  163/62 H 


 


 10/20/20 08:07   73     


 


 10/20/20 08:00  98.1 F       94 L


 


 10/20/20 07:35        88 L


 


 10/20/20 04:00  98.3 F      














  Pulse Ox


 


 10/20/20 12:00 


 


 10/20/20 10:30  95


 


 10/20/20 08:07 


 


 10/20/20 08:00 


 


 10/20/20 07:35 


 


 10/20/20 04:00 








                                     Weight











Admit Weight                   176 lb 8 oz


 


Weight                         178 lb 6.4 oz











                            Most Recent Monitor Data











Heart Rate from ECG            75


 


NIBP                           145/53


 


NIBP BP-Mean                   83


 


Respiration from ECG           25


 


SpO2                           100














I&O: 


                                        











 10/19/20 10/20/20 10/21/20





 06:59 06:59 06:59


 


Intake Total 1340 1360 


 


Output Total 1175 1150 350


 


Balance 165 210 -350











Result Diagrams: 


                                 10/20/20 03:19





                                 10/20/20 03:19


Additional Labs: 


                                   Accuchecks











  10/20/20 10/20/20 10/19/20





  12:18 05:16 21:35


 


POC Glucose  184 H  228 H  148 H














  10/19/20 10/19/20





  15:24 09:04


 


POC Glucose  83  71














Hospitalist ROS





- Medication


Medications: 


Active Medications











Generic Name Dose Route Start Last Admin





  Trade Name Freq  PRN Reason Stop Dose Admin


 


Acetaminophen  650 mg  10/12/20 14:11  10/17/20 22:18





  Acetaminophen 325 Mg Tab  PO   650 mg





  Q4H PRN   Administration





  Headache/Fever/Mild Pain (1-3)  


 


Amlodipine Besylate  5 mg  10/13/20 09:00  10/20/20 08:07





  Amlodipine 5 Mg Tab  PO   5 mg





  BID SAIDA   Administration


 


Apixaban  5 mg  10/18/20 21:00  10/20/20 08:07





  Apixaban 5 Mg Tab  PO   5 mg





  BID SAIDA   Administration


 


Ascorbic Acid  1,000 mg  10/15/20 21:00  10/20/20 08:06





  Ascorbic Acid 500 Mg Chewable Tablet  PO   1,000 mg





  BID SAIDA   Administration


 


Benzonatate  100 mg  10/13/20 09:28  10/14/20 08:40





  Benzonatate 100 Mg Cap  PO   100 mg





  Q4H PRN   Administration





  Cough  


 


Brimonidine Tartrate  1 drop  10/13/20 09:00  10/20/20 08:08





  Brimonidine Tartrate 0.2% Ophth Soln 5 Ml Bottle  R EYE   1 drop





  BID SAIDA   Administration


 


Calcitriol  0.25 mcg  10/13/20 09:00  10/20/20 08:07





  Calcitriol 0.25 Mcg Cap  PO   0.25 mcg





  DAILY SAIDA   Administration


 


Cholecalciferol  1,000 units  10/13/20 09:00  10/20/20 08:07





  Cholecalciferol 1,000 Units (25 Mcg) Tab  PO   1,000 units





  DAILY SAIDA   Administration


 


Cyclobenzaprine HCl  10 mg  10/16/20 12:19  10/17/20 11:26





  Cyclobenzaprine 10 Mg Tab  PO   10 mg





  TID PRN   Administration





  Muscle Spasm  


 


Dextrose/Water  25 gm  10/12/20 14:15  10/19/20 04:23





  Dextrose 50% Abboject 50 Ml Syringe  SLOW IVP   25 gm





  PRN PRN   Administration





  Hypoglycemia  


 


Diphenhydramine HCl  25 mg  10/14/20 04:24  10/19/20 21:21





  Diphenhydramine 25 Mg Cap  PO   25 mg





  HSPRN PRN   Administration





  Insomnia  


 


Dorzolamide/Timolol  1 drop  10/13/20 09:00  10/20/20 08:10





  Dorzolamide/Timolol 2%/0.5% Ophth Soln 10 Ml Bottle  EA EYE   1 each





  TID SAIDA   Administration


 


Fish Oil  3,000 mg  10/13/20 09:00  10/20/20 08:06





  Fish Oil 1,000 Mg Cap  PO   3,000 mg





  DAILY SAIDA   Administration


 


Guaifenesin  600 mg  10/13/20 21:00  10/20/20 08:07





  Guaifenesin Er 600 Mg Tab  PO   600 mg





  Q12HR SAIDA   Administration


 


Hydralazine HCl  50 mg  10/13/20 09:00  10/20/20 08:07





  Hydralazine 25 Mg Tab  PO   50 mg





  TID SAIDA   Administration


 


Insulin Glargine 45 units/  0.45 mls @ 0 mls/hr  10/13/20 09:00  10/19/20 23:19





  Miscellaneous Medication  SC   Not Given





  BID SAIDA  


 


Insulin Human Lispro  0 units  10/12/20 14:15  10/17/20 05:08





  Humalog 300 Units/3 Ml Vial  SC   3 unit





  .MILD SLIDING SCALE PRN   Administration





  Mild Correctional Scale  


 


Insulin Human Lispro  0 units  10/12/20 21:21  10/17/20 22:19





  Humalog 300 Units/3 Ml Vial  SC   2 unit





  .BEDTIME SLIDING SC PRN   Administration





  Bedtime Correctional Scale  


 


Labetalol HCl  10 mg  10/12/20 14:18  10/14/20 01:50





  Labetalol Hcl 100 Mg/20 Ml Vial  SLOW IVP   2 ml





  Q4H PRN   Administration





  SBP Greater Than 180  


 


Levothyroxine Sodium  25 mcg  10/17/20 06:00  10/20/20 06:47





  Levothyroxine Sodium 25 Mcg Tab  PO   25 mcg





  0600 SIADA   Administration


 


Melatonin  3 mg  10/14/20 02:12  10/17/20 22:18





  Melatonin 3 Mg Tab  PO   3 mg





  HSPRN PRN   Administration





  Insomnia  


 


Melatonin  3 mg  10/16/20 15:40  10/19/20 21:23





  Melatonin 3 Mg Tab  PO   3 mg





  HS PRN   Administration





  Insomnia  


 


Methylprednisolone Sodium Succinate  40 mg  10/17/20 21:00  10/20/20 08:10





  Methylprednisolone Sod Succ 40 Mg Vial  IVP   40 mg





  Q12HR SAIDA   Administration


 


Metoprolol Tartrate  50 mg  10/13/20 09:00  10/20/20 08:07





  Metoprolol Tartrate 50 Mg Tab  PO   50 mg





  BID SAIDA   Administration


 


Pantoprazole Sodium  40 mg  10/13/20 09:00  10/20/20 08:07





  Pantoprazole 40 Mg Tab  PO   40 mg





  DAILY SAIDA   Administration


 


Polyvinyl Alcohol/Povidone  1 each  10/13/20 09:00  10/20/20 08:11





  Polyvinyl Alcohol 1.4%/Povidone 0.6% Opth Drops  EA EYE   1 each





  DAILY SAIDA   Administration


 


Propylene Glycol  1 drop  10/13/20 09:00  10/20/20 08:08





  Polyethylene Glycol Opth Drop 15 Ml Bot  EA EYE   1 each





  BID SAIDA   Administration


 


Simvastatin  10 mg  10/19/20 21:00  10/19/20 21:21





  Simvastatin 10 Mg Tab  PO   10 mg





  2100 SAIDA   Administration


 


Sodium Bicarbonate  650 mg  10/16/20 15:00  10/20/20 08:07





  Sodium Bicarbonate Tab 325 Mg Tab  PO   650 mg





  TID SAIDA   Administration


 


Sodium Chloride  10 ml  10/13/20 09:00  10/20/20 08:07





  Flush - Normal Saline 10 Ml Syringe  IVF   10 ml





  Q12HR SAIDA   Administration


 


Throat Lozenges  1 gloria  10/18/20 23:28  10/19/20 00:13





  Cepastat Lozenges 1 Gloria  PO   1 gloria





  Q2H PRN   Administration





  Sore Throat  


 


Zinc Sulfate  220 mg  10/16/20 09:00  10/20/20 08:07





  Zinc Sulfate 220 Mg Cap  PO   220 mg





  DAILY SAIDA   Administration














- Exam


General Appearance: awake alert


ENT: normocephalic atraumatic


Neck: supple, no JVD


Heart: RRR, no murmur, no gallops, no rubs


Respiratory: no tachypnea, rhonchi


Gastrointestinal: soft, non-tender, non-distended


Neurological: cranial nerve grossly intact, no focal deficits





[facility]





- Plan








Patient is a pleasant 64 years old female with multiple comorbidities including 

diabetes type 2, hypertension, chronic systolic heart failure, hypertension who 

was diagnosed with Covid approximately 3 days ago, presented to ED with 

worsening dyspnea.





Acute hypoxic respiratory failure secondary to COVID-19


The patient is requiring more oxygen today with FiO2 of 66%.


She remains on corticosteroids and status post convalescent plasma.


Her leukocytosis is worsening and there is neutropenia and bandemia.


I will change IV antibiotics to vancomycin and meropenem.


Check chest x-ray.








RAMONA on CKD stage III


Creatinine level slightly trending up.


Hyperkalemia is present.


Start normal saline at 70 cc/h.





Metabolic Acidosis


--add Sodium Bicarb, follow BMP 





Diabetes type 2


--BG elevated d/t steroid. Resume Lantus, cont ISS





Hypothyroidism


--cont Levothyroxine 





Klebsiella UTI, poa


--cont IV abx as above 





DVT ppx: Lovenox


GI ppx: PPI


Code Status: Full code


Anticipated Dispo: Home when medically stable





I have updated her daughter daily, Mindy over the phone with regard to her 

change of condition.
- Subjective


Encounter Date: 10/21/20


Subjective: 


She is becoming confused.





- Objective


Vital Signs & Weight: 


                             Vital Signs (12 hours)











  Temp Pulse Pulse Pulse BP BP BP


 


 10/21/20 14:52    106 H  89   159/76 H  154/77 H


 


 10/21/20 14:18   73    157/68 H  


 


 10/21/20 12:00  97.7 F      


 


 10/21/20 09:34   73    157/68 H  


 


 10/21/20 09:32   73    157/68 H  


 


 10/21/20 08:00  98.1 F      


 


 10/21/20 07:56       














  Pulse Ox Pulse Ox Pulse Ox Pulse Ox


 


 10/21/20 14:52   89 L  92 L  90 L


 


 10/21/20 14:18    


 


 10/21/20 12:00    


 


 10/21/20 09:34    


 


 10/21/20 09:32    


 


 10/21/20 08:00    


 


 10/21/20 07:56  96   








                                     Weight











Admit Weight                   176 lb 8 oz


 


Weight                         178 lb 6.4 oz











                            Most Recent Monitor Data











Heart Rate from ECG            80


 


NIBP                           164/59


 


NIBP BP-Mean                   94


 


Respiration from ECG           27


 


SpO2                           99














I&O: 


                                        











 10/20/20 10/21/20 10/22/20





 06:59 06:59 06:59


 


Intake Total 1360 2360 


 


Output Total 1150 1700 


 


Balance 210 660 











Result Diagrams: 


                                 10/21/20 03:13





                                 10/21/20 03:13


Additional Labs: 


                                   Accuchecks











  10/21/20 10/21/20 10/20/20





  11:03 06:59 20:23


 


POC Glucose  195 H  126 H  153 H














  10/20/20





  18:30


 


POC Glucose  170 H














Hospitalist ROS





- Medication


Medications: 


Active Medications











Generic Name Dose Route Start Last Admin





  Trade Name Freq  PRN Reason Stop Dose Admin


 


Acetaminophen  650 mg  10/12/20 14:11  10/17/20 22:18





  Acetaminophen 325 Mg Tab  PO   650 mg





  Q4H PRN   Administration





  Headache/Fever/Mild Pain (1-3)  


 


Amlodipine Besylate  5 mg  10/13/20 09:00  10/21/20 09:32





  Amlodipine 5 Mg Tab  PO   5 mg





  BID SAIDA   Administration


 


Apixaban  5 mg  10/18/20 21:00  10/21/20 09:32





  Apixaban 5 Mg Tab  PO   5 mg





  BID SAIDA   Administration


 


Ascorbic Acid  1,000 mg  10/15/20 21:00  10/21/20 09:33





  Ascorbic Acid 500 Mg Chewable Tablet  PO   1,000 mg





  BID SAIDA   Administration


 


Benzonatate  100 mg  10/13/20 09:28  10/14/20 08:40





  Benzonatate 100 Mg Cap  PO   100 mg





  Q4H PRN   Administration





  Cough  


 


Brimonidine Tartrate  1 drop  10/13/20 09:00  10/21/20 09:33





  Brimonidine Tartrate 0.2% Ophth Soln 5 Ml Bottle  R EYE   1 drop





  BID SAIDA   Administration


 


Calcitriol  0.25 mcg  10/13/20 09:00  10/21/20 09:34





  Calcitriol 0.25 Mcg Cap  PO   0.25 mcg





  DAILY SAIDA   Administration


 


Cholecalciferol  1,000 units  10/13/20 09:00  10/21/20 09:34





  Cholecalciferol 1,000 Units (25 Mcg) Tab  PO   1,000 units





  DAILY SAIDA   Administration


 


Cyclobenzaprine HCl  10 mg  10/16/20 12:19  10/17/20 11:26





  Cyclobenzaprine 10 Mg Tab  PO   10 mg





  TID PRN   Administration





  Muscle Spasm  


 


Dextrose/Water  25 gm  10/12/20 14:15  10/19/20 04:23





  Dextrose 50% Abboject 50 Ml Syringe  SLOW IVP   25 gm





  PRN PRN   Administration





  Hypoglycemia  


 


Diphenhydramine HCl  25 mg  10/14/20 04:24  10/20/20 20:11





  Diphenhydramine 25 Mg Cap  PO   25 mg





  HSPRN PRN   Administration





  Insomnia  


 


Dorzolamide/Timolol  1 drop  10/13/20 09:00  10/21/20 14:17





  Dorzolamide/Timolol 2%/0.5% Ophth Soln 10 Ml Bottle  EA EYE   1 each





  TID SAIDA   Administration


 


Fish Oil  3,000 mg  10/13/20 09:00  10/21/20 09:34





  Fish Oil 1,000 Mg Cap  PO   3,000 mg





  DAILY SAIDA   Administration


 


Guaifenesin  600 mg  10/13/20 21:00  10/21/20 09:34





  Guaifenesin Er 600 Mg Tab  PO   600 mg





  Q12HR SAIDA   Administration


 


Hydralazine HCl  50 mg  10/13/20 09:00  10/21/20 14:18





  Hydralazine 25 Mg Tab  PO   50 mg





  TID SAIDA   Administration


 


Insulin Glargine 45 units/  0.45 mls @ 0 mls/hr  10/13/20 09:00  10/21/20 09:34





  Miscellaneous Medication  SC   0.45 mls





  BID SAIDA   Administration


 


Meropenem 1 gm/ Device  50 mls @ 100 mls/hr  10/20/20 22:00  10/21/20 09:37





  IVPB   50 mls





  1000,2200 SAIDA   Administration


 


Vancomycin HCl 1.25 gm/ Sodium  250 mls @ 166.667 mls/hr  10/20/20 21:00  

10/20/20 21:50





  Chloride  IVPB   250 mls





  Q24HR SAIDA   Administration


 


Sodium Chloride  1,000 mls @ 70 mls/hr  10/20/20 15:00  10/21/20 06:29





  Normal Saline 0.9%  IV   1,000 mls





  .[id number] SAIDA   Administration


 


Insulin Human Lispro  0 units  10/12/20 14:15  10/17/20 05:08





  Humalog 300 Units/3 Ml Vial  SC   3 unit





  .MILD SLIDING SCALE PRN   Administration





  Mild Correctional Scale  


 


Insulin Human Lispro  0 units  10/12/20 21:21  10/17/20 22:19





  Humalog 300 Units/3 Ml Vial  SC   2 unit





  .BEDTIME SLIDING SC PRN   Administration





  Bedtime Correctional Scale  


 


Labetalol HCl  10 mg  10/12/20 14:18  10/14/20 01:50





  Labetalol Hcl 100 Mg/20 Ml Vial  SLOW IVP   2 ml





  Q4H PRN   Administration





  SBP Greater Than 180  


 


Levothyroxine Sodium  25 mcg  10/17/20 06:00  10/21/20 06:29





  Levothyroxine Sodium 25 Mcg Tab  PO   25 mcg





  0600 SAIDA   Administration


 


Melatonin  3 mg  10/14/20 02:12  10/17/20 22:18





  Melatonin 3 Mg Tab  PO   3 mg





  HSPRN PRN   Administration





  Insomnia  


 


Melatonin  3 mg  10/16/20 15:40  10/20/20 20:12





  Melatonin 3 Mg Tab  PO   3 mg





  HS PRN   Administration





  Insomnia  


 


Methylprednisolone Sodium Succinate  40 mg  10/17/20 21:00  10/21/20 09:35





  Methylprednisolone Sod Succ 40 Mg Vial  IVP   40 mg





  Q12HR SAIDA   Administration


 


Metoprolol Tartrate  50 mg  10/13/20 09:00  10/21/20 09:35





  Metoprolol Tartrate 50 Mg Tab  PO   50 mg





  BID SAIDA   Administration


 


Pantoprazole Sodium  40 mg  10/13/20 09:00  10/21/20 09:35





  Pantoprazole 40 Mg Tab  PO   40 mg





  DAILY SAIDA   Administration


 


Polyvinyl Alcohol/Povidone  1 each  10/13/20 09:00  10/21/20 09:35





  Polyvinyl Alcohol 1.4%/Povidone 0.6% Opth Drops  EA EYE   1 each





  DAILY SAIDA   Administration


 


Propylene Glycol  1 drop  10/13/20 09:00  10/21/20 09:33





  Polyethylene Glycol Opth Drop 15 Ml Bot  EA EYE   1 each





  BID SAIDA   Administration


 


Simvastatin  10 mg  10/19/20 21:00  10/20/20 20:13





  Simvastatin 10 Mg Tab  PO   10 mg





  2100 SAIDA   Administration


 


Sodium Bicarbonate  650 mg  10/16/20 15:00  10/21/20 14:17





  Sodium Bicarbonate Tab 325 Mg Tab  PO   650 mg





  TID SAIDA   Administration


 


Sodium Chloride  10 ml  10/13/20 09:00  10/21/20 09:35





  Flush - Normal Saline 10 Ml Syringe  IVF   10 ml





  Q12HR SAIDA   Administration


 


Throat Lozenges  1 gloria  10/18/20 23:28  10/19/20 00:13





  Cepastat Lozenges 1 Gloria  PO   1 gloria





  Q2H PRN   Administration





  Sore Throat  


 


Zinc Sulfate  220 mg  10/16/20 09:00  10/21/20 09:35





  Zinc Sulfate 220 Mg Cap  PO   220 mg





  DAILY SAIDA   Administration














- Exam


General Appearance: awake alert, ill appearing


ENT: normocephalic atraumatic, no oropharyngeal lesions


Neck: supple, no JVD


Heart: RRR


Respiratory: normal chest expansion, no tachypnea, rales


Gastrointestinal: soft


Neurological: cranial nerve grossly intact, no focal deficits





[facility]





- Plan








Patient is a pleasant 64 years old female with multiple comorbidities including 

diabetes type 2, hypertension, chronic systolic heart failure, hypertension who 

was diagnosed with Covid approximately 3 days ago, presented to ED with 

worsening dyspnea.





Acute hypoxic respiratory failure secondary to COVID-19


The patient is requiring more oxygen today with FiO2 of 66%.


She remains on corticosteroids and status post convalescent plasma.


Her leukocytosis is worsening and there is neutropenia and bandemia.


I will change IV antibiotics to vancomycin and meropenem.


Check chest x-ray.








RAMONA on CKD stage III


Creatinine level slightly trending up.


Hyperkalemia is present.


Start normal saline at 70 cc/h.





Metabolic Acidosis


--add Sodium Bicarb, follow BMP 





Diabetes type 2


--BG elevated d/t steroid. Resume Lantus, cont ISS





Hypothyroidism


--cont Levothyroxine 





Klebsiella UTI, poa


--cont IV abx as above 





DVT ppx: Lovenox


GI ppx: PPI


Code Status: Full code


Anticipated Dispo: Home when medically stable





I have updated her daughter daily, Mindy over the phone with regard to her 

change of condition.
- Subjective


Encounter Date: 10/22/20


Subjective: 


Patient continues to require more oxygen every day.





- Objective


Vital Signs & Weight: 


                             Vital Signs (12 hours)











  Temp Pulse BP Pulse Ox


 


 10/22/20 14:25   73  182/68 H 


 


 10/22/20 11:58  97.8 F   


 


 10/22/20 08:29   73  182/68 H 


 


 10/22/20 08:26   73  182/68 H 


 


 10/22/20 08:00  98.7 F   


 


 10/22/20 07:51     94 L


 


 10/22/20 04:00  98.4 F   








                                     Weight











Admit Weight                   176 lb 8 oz


 


Weight                         178 lb 6.4 oz











                            Most Recent Monitor Data











Heart Rate from ECG            96


 


NIBP                           151/66


 


NIBP BP-Mean                   94


 


Respiration from ECG           40


 


SpO2                           83














I&O: 


                                        











 10/21/20 10/22/20 10/23/20





 06:59 06:59 06:59


 


Intake Total 2360 3320 


 


Output Total 1700 1250 


 


Balance 660 2070 











Result Diagrams: 


                                 10/22/20 06:22





                                 10/22/20 06:22


Additional Labs: 


                                   Accuchecks











  10/22/20 10/21/20 10/21/20





  06:00 17:51 11:03


 


POC Glucose  139 H  238 H  195 H














Hospitalist ROS





- Medication


Medications: 


Active Medications











Generic Name Dose Route Start Last Admin





  Trade Name Freq  PRN Reason Stop Dose Admin


 


Acetaminophen  650 mg  10/12/20 14:11  10/17/20 22:18





  Acetaminophen 325 Mg Tab  PO   650 mg





  Q4H PRN   Administration





  Headache/Fever/Mild Pain (1-3)  


 


Amlodipine Besylate  5 mg  10/13/20 09:00  10/22/20 08:26





  Amlodipine 5 Mg Tab  PO   5 mg





  BID SAIDA   Administration


 


Apixaban  5 mg  10/18/20 21:00  10/22/20 08:26





  Apixaban 5 Mg Tab  PO   5 mg





  BID SAIDA   Administration


 


Ascorbic Acid  1,000 mg  10/15/20 21:00  10/22/20 08:27





  Ascorbic Acid 500 Mg Chewable Tablet  PO   1,000 mg





  BID SAIDA   Administration


 


Benzonatate  100 mg  10/13/20 09:28  10/14/20 08:40





  Benzonatate 100 Mg Cap  PO   100 mg





  Q4H PRN   Administration





  Cough  


 


Brimonidine Tartrate  1 drop  10/13/20 09:00  10/22/20 08:27





  Brimonidine Tartrate 0.2% Ophth Soln 5 Ml Bottle  R EYE   1 drop





  BID SAIDA   Administration


 


Calcitriol  0.25 mcg  10/13/20 09:00  10/22/20 08:28





  Calcitriol 0.25 Mcg Cap  PO   0.25 mcg





  DAILY SAIDA   Administration


 


Cholecalciferol  1,000 units  10/13/20 09:00  10/22/20 08:28





  Cholecalciferol 1,000 Units (25 Mcg) Tab  PO   1,000 units





  DAILY SAIDA   Administration


 


Cyclobenzaprine HCl  10 mg  10/16/20 12:19  10/17/20 11:26





  Cyclobenzaprine 10 Mg Tab  PO   10 mg





  TID PRN   Administration





  Muscle Spasm  


 


Dextrose/Water  25 gm  10/12/20 14:15  10/19/20 04:23





  Dextrose 50% Abboject 50 Ml Syringe  SLOW IVP   25 gm





  PRN PRN   Administration





  Hypoglycemia  


 


Diphenhydramine HCl  25 mg  10/14/20 04:24  10/20/20 20:11





  Diphenhydramine 25 Mg Cap  PO   25 mg





  HSPRN PRN   Administration





  Insomnia  


 


Dorzolamide/Timolol  1 drop  10/13/20 09:00  10/22/20 14:25





  Dorzolamide/Timolol 2%/0.5% Ophth Soln 10 Ml Bottle  EA EYE   1 each





  TID SAIDA   Administration


 


Fish Oil  3,000 mg  10/13/20 09:00  10/22/20 08:28





  Fish Oil 1,000 Mg Cap  PO   3,000 mg





  DAILY SAIDA   Administration


 


Guaifenesin  600 mg  10/13/20 21:00  10/22/20 08:29





  Guaifenesin Er 600 Mg Tab  PO   600 mg





  Q12HR SAIDA   Administration


 


Haloperidol Lactate  5 mg  10/22/20 13:46  10/22/20 14:26





  Haloperidol Lactate 5 Mg/Ml Vial  IM   5 mg





  Q4H PRN   Administration





  Agitation  


 


Hydralazine HCl  50 mg  10/13/20 09:00  10/22/20 14:25





  Hydralazine 25 Mg Tab  PO   50 mg





  TID SAIDA   Administration


 


Insulin Glargine 45 units/  0.45 mls @ 0 mls/hr  10/13/20 09:00  10/22/20 08:29





  Miscellaneous Medication  SC   0.45 mls





  BID SAIDA   Administration


 


Meropenem 1 gm/ Device  50 mls @ 100 mls/hr  10/20/20 22:00  10/22/20 11:17





  IVPB   50 mls





  1000,2200 SAIDA   Administration


 


Vancomycin HCl 1.25 gm/ Sodium  250 mls @ 166.667 mls/hr  10/20/20 21:00  

10/21/20 20:32





  Chloride  IVPB   250 mls





  Q24HR SAIDA   Administration


 


Sodium Chloride  1,000 mls @ 70 mls/hr  10/20/20 15:00  10/22/20 11:17





  Normal Saline 0.9%  IV   1,000 mls





  .[id number] SAIDA   Administration


 


Insulin Human Lispro  0 units  10/12/20 14:15  10/17/20 05:08





  Humalog 300 Units/3 Ml Vial  SC   3 unit





  .MILD SLIDING SCALE PRN   Administration





  Mild Correctional Scale  


 


Insulin Human Lispro  0 units  10/12/20 21:21  10/17/20 22:19





  Humalog 300 Units/3 Ml Vial  SC   2 unit





  .BEDTIME SLIDING SC PRN   Administration





  Bedtime Correctional Scale  


 


Labetalol HCl  10 mg  10/12/20 14:18  10/14/20 01:50





  Labetalol Hcl 100 Mg/20 Ml Vial  SLOW IVP   2 ml





  Q4H PRN   Administration





  SBP Greater Than 180  


 


Levothyroxine Sodium  25 mcg  10/17/20 06:00  10/22/20 05:51





  Levothyroxine Sodium 25 Mcg Tab  PO   25 mcg





  0600 SAIDA   Administration


 


Melatonin  3 mg  10/14/20 02:12  10/17/20 22:18





  Melatonin 3 Mg Tab  PO   3 mg





  HSPRN PRN   Administration





  Insomnia  


 


Melatonin  3 mg  10/16/20 15:40  10/20/20 20:12





  Melatonin 3 Mg Tab  PO   3 mg





  HS PRN   Administration





  Insomnia  


 


Methylprednisolone Sodium Succinate  40 mg  10/17/20 21:00  10/22/20 08:29





  Methylprednisolone Sod Succ 40 Mg Vial  IVP   40 mg





  Q12HR SAIDA   Administration


 


Metoprolol Tartrate  50 mg  10/13/20 09:00  10/22/20 08:29





  Metoprolol Tartrate 50 Mg Tab  PO   50 mg





  BID SAIDA   Administration


 


Pantoprazole Sodium  40 mg  10/13/20 09:00  10/22/20 08:29





  Pantoprazole 40 Mg Tab  PO   40 mg





  DAILY SAIDA   Administration


 


Polyvinyl Alcohol/Povidone  1 each  10/13/20 09:00  10/22/20 08:30





  Polyvinyl Alcohol 1.4%/Povidone 0.6% Opth Drops  EA EYE   1 each





  DAILY SAIDA   Administration


 


Propylene Glycol  1 drop  10/13/20 09:00  10/22/20 08:30





  Polyethylene Glycol Opth Drop 15 Ml Bot  EA EYE   1 each





  BID SAIDA   Administration


 


Simvastatin  10 mg  10/19/20 21:00  10/21/20 20:28





  Simvastatin 10 Mg Tab  PO   10 mg





  2100 SAIDA   Administration


 


Sodium Bicarbonate  650 mg  10/16/20 15:00  10/22/20 14:25





  Sodium Bicarbonate Tab 325 Mg Tab  PO   650 mg





  TID SAIDA   Administration


 


Sodium Chloride  10 ml  10/13/20 09:00  10/22/20 08:31





  Flush - Normal Saline 10 Ml Syringe  IVF   10 ml





  Q12HR SAIDA   Administration


 


Throat Lozenges  1 gloria  10/18/20 23:28  10/19/20 00:13





  Cepastat Lozenges 1 Gloria  PO   1 gloria





  Q2H PRN   Administration





  Sore Throat  


 


Zinc Sulfate  220 mg  10/16/20 09:00  10/22/20 08:31





  Zinc Sulfate 220 Mg Cap  PO   220 mg





  DAILY SAIDA   Administration














- Exam


General Appearance: awake alert


ENT: normocephalic atraumatic


Heart: RRR (On the monitor)


Respiratory: normal chest expansion, no tachypnea


Extremities: no cyanosis, no clubbing





[facility]





- Plan








Patient is a pleasant 64 years old female with multiple comorbidities including 

diabetes type 2, hypertension, chronic systolic heart failure, hypertension who 

was diagnosed with Covid approximately 3 days ago, presented to ED with 

worsening dyspnea.





Acute hypoxic respiratory failure secondary to COVID-19


The patient is requiring more oxygen today with FiO2 of 80%..


She remains on corticosteroids and status post convalescent plasma.


Her leukocytosis is worsening and there is neutropenia and bandemia.


Continue broad-spectrum antibiotics.


Chest x-ray showed worsening infiltrates








RAMONA on CKD stage III


Creatinine level slightly trending up.


Hyperkalemia is present.


Start normal saline at 70 cc/h.








Diabetes type 2


--BG elevated d/t steroid. Resume Lantus, cont ISS





Hypothyroidism


--cont Levothyroxine 





Klebsiella UTI, poa


--cont IV abx as above 





DVT ppx: Lovenox


GI ppx: PPI


Code Status: Full code


Anticipated Dispo: Home when medically stable





I have updated her daughter daily, Mindy over the phone with regard to her 

change of condition.
- Subjective


Encounter Date: 10/23/20


Subjective: 


The patient is now intubated.





- Objective


Vital Signs & Weight: 


                             Vital Signs (12 hours)











  Temp Pulse Resp BP BP Pulse Ox


 


 10/23/20 12:00  97.2 F L     


 


 10/23/20 10:58   75   98/51 L  


 


 10/23/20 10:57   75   98/51 L  


 


 10/23/20 10:02   75   98/51 L  


 


 10/23/20 10:00    28 H   


 


 10/23/20 09:30       100


 


 10/23/20 04:00  98.0 F     156/88 H 








                                     Weight











Admit Weight                   176 lb 8 oz


 


Weight                         178 lb 6.4 oz











                            Most Recent Monitor Data











Heart Rate from ECG            65


 


NIBP                           96/48


 


NIBP BP-Mean                   64


 


Respiration from ECG           28


 


SpO2                           100














I&O: 


                                        











 10/22/20 10/23/20 10/24/20





 06:59 06:59 06:59


 


Intake Total 3320 2829.9 210


 


Output Total 1250 1650 475


 


Balance 2070 1179.9 -265











Result Diagrams: 


                                 10/23/20 10:45





                                 10/23/20 10:46


Additional Labs: 


                                   Accuchecks











  10/23/20 10/22/20





  00:26 21:44


 


POC Glucose  106 H  87














Hospitalist ROS





- Medication


Medications: 


Active Medications











Generic Name Dose Route Start Last Admin





  Trade Name Freq  PRN Reason Stop Dose Admin


 


Acetaminophen  650 mg  10/12/20 14:11  10/17/20 22:18





  Acetaminophen 325 Mg Tab  PO   650 mg





  Q4H PRN   Administration





  Headache/Fever/Mild Pain (1-3)  


 


Amlodipine Besylate  5 mg  10/13/20 09:00  10/23/20 10:57





  Amlodipine 5 Mg Tab  PO   Not Given





  BID SAIDA  


 


Apixaban  5 mg  10/18/20 21:00  10/23/20 09:44





  Apixaban 5 Mg Tab  PO   5 mg





  BID SAIDA   Administration


 


Ascorbic Acid  1,000 mg  10/15/20 21:00  10/23/20 09:56





  Ascorbic Acid 500 Mg Chewable Tablet  PO   1,000 mg





  BID SAIDA   Administration


 


Benzonatate  100 mg  10/13/20 09:28  10/14/20 08:40





  Benzonatate 100 Mg Cap  PO   100 mg





  Q4H PRN   Administration





  Cough  


 


Brimonidine Tartrate  1 drop  10/13/20 09:00  10/23/20 11:22





  Brimonidine Tartrate 0.2% Ophth Soln 5 Ml Bottle  R EYE   1 drop





  BID SAIDA   Administration


 


Calcitriol  0.25 mcg  10/13/20 09:00  10/23/20 09:57





  Calcitriol 0.25 Mcg Cap  PO   Not Given





  DAILY SAIDA  


 


Cholecalciferol  1,000 units  10/13/20 09:00  10/23/20 11:22





  Cholecalciferol 1,000 Units (25 Mcg) Tab  PO   1,000 units





  DAILY SAIDA   Administration


 


Cyclobenzaprine HCl  10 mg  10/16/20 12:19  10/17/20 11:26





  Cyclobenzaprine 10 Mg Tab  PO   10 mg





  TID PRN   Administration





  Muscle Spasm  


 


Dextrose/Water  25 gm  10/12/20 14:15  10/19/20 04:23





  Dextrose 50% Abboject 50 Ml Syringe  SLOW IVP   25 gm





  PRN PRN   Administration





  Hypoglycemia  


 


Diphenhydramine HCl  25 mg  10/14/20 04:24  10/20/20 20:11





  Diphenhydramine 25 Mg Cap  PO   25 mg





  HSPRN PRN   Administration





  Insomnia  


 


Dorzolamide/Timolol  1 drop  10/13/20 09:00  10/23/20 11:22





  Dorzolamide/Timolol 2%/0.5% Ophth Soln 10 Ml Bottle  EA EYE   1 each





  TID SAIDA   Administration


 


Fish Oil  3,000 mg  10/13/20 09:00  10/23/20 11:23





  Fish Oil 1,000 Mg Cap  PO   Not Given





  DAILY SAIDA  


 


Guaifenesin  600 mg  10/13/20 21:00  10/23/20 11:23





  Guaifenesin Er 600 Mg Tab  PO   600 mg





  Q12HR SAIDA   Administration


 


Haloperidol Lactate  5 mg  10/22/20 13:46  10/22/20 14:26





  Haloperidol Lactate 5 Mg/Ml Vial  IM   5 mg





  Q4H PRN   Administration





  Agitation  


 


Hydralazine HCl  50 mg  10/13/20 09:00  10/23/20 10:58





  Hydralazine 25 Mg Tab  PO   Not Given





  TID [facility]  


 


Insulin Glargine 45 units/  0.45 mls @ 0 mls/hr  10/13/20 09:00  10/23/20 09:57





  Miscellaneous Medication  SC   Not Given





  BID [facility]  


 


Meropenem 1 gm/ Device  50 mls @ 100 mls/hr  10/20/20 22:00  10/23/20 09:51





  IVPB   50 mls





  1000,2200 SAIDA   Administration


 


Sodium Chloride  1,000 mls @ 70 mls/hr  10/20/20 15:00  10/23/20 06:06





  Normal Saline 0.9%  IV   1,000 mls





  .[id number] SAIDA   Administration


 


Fentanyl Citrate 2,000 mcg/  100 mls @ 0 mls/hr  10/23/20 09:45  10/23/20 09:53





  Sodium Chloride  IV  11/22/20 09:45  100 mls





  INF SAIDA   Administration





  Protocol  





  Per Protocol  


 


Insulin Human Lispro  0 units  10/12/20 14:15  10/17/20 05:08





  Humalog 300 Units/3 Ml Vial  SC   3 unit





  .MILD SLIDING SCALE PRN   Administration





  Mild Correctional Scale  


 


Insulin Human Lispro  0 units  10/12/20 21:21  10/17/20 22:19





  Humalog 300 Units/3 Ml Vial  SC   2 unit





  .BEDTIME SLIDING SC PRN   Administration





  Bedtime Correctional Scale  


 


Isosorbide Dinitrate  20 mg  10/22/20 21:00  10/23/20 11:23





  Isosorbide Dinitrate 20 Mg Tab  PO   20 mg





  BID SAIDA   Administration


 


Labetalol HCl  10 mg  10/12/20 14:18  10/14/20 01:50





  Labetalol Hcl 100 Mg/20 Ml Vial  SLOW IVP   2 ml





  Q4H PRN   Administration





  SBP Greater Than 180  


 


Levothyroxine Sodium  25 mcg  10/17/20 06:00  10/23/20 06:06





  Levothyroxine Sodium 25 Mcg Tab  PO   25 mcg





  0600 SAIDA   Administration


 


Lorazepam  2 mg  10/23/20 09:45  10/23/20 10:21





  Lorazepam 2 Mg/Ml Vial  SLOW IVP  11/22/20 09:45  2 mg





  Q1H PRN   Administration





  Breakthrough agitation  


 


Melatonin  3 mg  10/14/20 02:12  10/17/20 22:18





  Melatonin 3 Mg Tab  PO   3 mg





  HSPRN PRN   Administration





  Insomnia  


 


Melatonin  3 mg  10/16/20 15:40  10/20/20 20:12





  Melatonin 3 Mg Tab  PO   3 mg





  HS PRN   Administration





  Insomnia  


 


Methylprednisolone Sodium Succinate  40 mg  10/17/20 21:00  10/23/20 09:44





  Methylprednisolone Sod Succ 40 Mg Vial  IVP   40 mg





  Q12HR SAIDA   Administration


 


Metoprolol Tartrate  50 mg  10/13/20 09:00  10/23/20 09:58





  Metoprolol Tartrate 50 Mg Tab  PO   50 mg





  BID SAIDA   Administration


 


Pantoprazole Sodium  40 mg  10/13/20 09:00  10/22/20 08:29





  Pantoprazole 40 Mg Tab  PO   40 mg





  DAILY SAIDA   Administration


 


Polyvinyl Alcohol/Povidone  1 each  10/13/20 09:00  10/23/20 11:23





  Polyvinyl Alcohol 1.4%/Povidone 0.6% Opth Drops  EA EYE   1 each





  DAILY SAIDA   Administration


 


Propylene Glycol  1 drop  10/13/20 09:00  10/23/20 11:24





  Polyethylene Glycol Opth Drop 15 Ml Bot  EA EYE   1 each





  BID SAIDA   Administration


 


Simvastatin  10 mg  10/19/20 21:00  10/22/20 21:38





  Simvastatin 10 Mg Tab  PO   10 mg





  2100 SAIDA   Administration


 


Sodium Bicarbonate  650 mg  10/16/20 15:00  10/23/20 11:24





  Sodium Bicarbonate Tab 325 Mg Tab  PO   650 mg





  TID SAIDA   Administration


 


Sodium Chloride  10 ml  10/13/20 09:00  10/23/20 11:24





  Flush - Normal Saline 10 Ml Syringe  IVF   10 ml





  Q12HR SAIDA   Administration


 


Throat Lozenges  1 gloria  10/18/20 23:28  10/19/20 00:13





  Cepastat Lozenges 1 Gloria  PO   1 gloria





  Q2H PRN   Administration





  Sore Throat  


 


Zinc Sulfate  220 mg  10/16/20 09:00  10/23/20 11:24





  Zinc Sulfate 220 Mg Cap  PO   220 mg





  DAILY SAIDA   Administration














- Exam


ENT: normocephalic atraumatic


Heart: RRR


Respiratory: no tachypnea





[facility]osp [facility]





- Plan








Patient is a pleasant 64 years old female with multiple comorbidities including 

diabetes type 2, hypertension, chronic systolic heart failure, hypertension who 

was diagnosed with Covid approximately 3 days ago, presented to ED with 

worsening dyspnea.





Acute hypoxic respiratory failure secondary to COVID-19


The patient has been requiring more oxygen over the past 3 days and now she is 

intubated.


She remains on corticosteroids and status post convalescent plasma.


Continue broad-spectrum antibiotics.


Chest x-ray showed worsening infiltrates








RAMONA on CKD stage III


Creatinine level stable.








Diabetes type 2


--BG elevated d/t steroid. Resume Lantus, cont ISS





Hypothyroidism


--cont Levothyroxine 





Klebsiella UTI, poa


--cont IV abx as above 





DVT ppx: Lovenox


GI ppx: PPI


Code Status: Full code


Anticipated Dispo: Home when medically stable
- Subjective


Encounter Date: 10/24/20


Encounter Time: 11:00


Subjective: 


Patient remains on the vent, not weanable. Attempting prone positioning 

currently.





- Objective


Vital Signs & Weight: 


                             Vital Signs (12 hours)











  Temp Pulse Resp BP Pulse Ox


 


 10/24/20 08:22   73   123/58 L 


 


 10/24/20 08:21   73   123/58 L 


 


 10/24/20 08:00    28 H  


 


 10/24/20 07:29      99


 


 10/24/20 06:00    28 H  


 


 10/24/20 04:00  98.3 F   28 H  


 


 10/24/20 02:20   62   105/50 L  100


 


 10/24/20 02:00    28 H  


 


 10/24/20 00:00  98.1 F   28 H  


 


 10/23/20 22:25   60   99/49 L 


 


 10/23/20 22:00    28 H  








                                     Weight











Admit Weight                   176 lb 8 oz


 


Weight                         178 lb 6.4 oz











                            Most Recent Monitor Data











Heart Rate from ECG            75


 


NIBP                           115/56


 


NIBP BP-Mean                   75


 


Respiration from ECG           28


 


SpO2                           99














I&O: 


                                        











 10/23/20 10/24/20 10/25/20





 06:59 06:59 06:59


 


Intake Total 2829.9 2059.4 0


 


Output Total 1650 1270 80


 


Balance 1179.9 789.4 -80











Result Diagrams: 


                                 10/24/20 03:12





                                 10/24/20 03:12


Additional Labs: 


                                   Accuchecks











  10/24/20





  00:43


 


POC Glucose  108 H














Hospitalist ROS





- Review of Systems


ROS unobtainable: due to endotracheal tube





- Medication


Medications: 


Active Medications











Generic Name Dose Route Start Last Admin





  Trade Name Freq  PRN Reason Stop Dose Admin


 


Acetaminophen  650 mg  10/12/20 14:11  10/17/20 22:18





  Acetaminophen 325 Mg Tab  PO   650 mg





  Q4H PRN   Administration





  Headache/Fever/Mild Pain (1-3)  


 


Amlodipine Besylate  5 mg  10/13/20 09:00  10/24/20 08:22





  Amlodipine 5 Mg Tab  PO   5 mg





  BID SAIDA   Administration


 


Ascorbic Acid  1,000 mg  10/15/20 21:00  10/24/20 08:22





  Ascorbic Acid 500 Mg Chewable Tablet  PO   1,000 mg





  BID SAIDA   Administration


 


Benzonatate  100 mg  10/13/20 09:28  10/14/20 08:40





  Benzonatate 100 Mg Cap  PO   100 mg





  Q4H PRN   Administration





  Cough  


 


Brimonidine Tartrate  1 drop  10/13/20 09:00  10/23/20 20:38





  Brimonidine Tartrate 0.2% Ophth Soln 5 Ml Bottle  R EYE   1 drop





  BID SAIDA   Administration


 


Calcitriol  0.25 mcg  10/13/20 09:00  10/23/20 09:57





  Calcitriol 0.25 Mcg Cap  PO   Not Given





  DAILY SAIDA  


 


Cholecalciferol  1,000 units  10/13/20 09:00  10/24/20 08:22





  Cholecalciferol 1,000 Units (25 Mcg) Tab  PO   1,000 units





  DAILY SAIDA   Administration


 


Cyclobenzaprine HCl  10 mg  10/16/20 12:19  10/17/20 11:26





  Cyclobenzaprine 10 Mg Tab  PO   10 mg





  TID PRN   Administration





  Muscle Spasm  


 


Dextrose/Water  25 gm  10/12/20 14:15  10/19/20 04:23





  Dextrose 50% Abboject 50 Ml Syringe  SLOW IVP   25 gm





  PRN PRN   Administration





  Hypoglycemia  


 


Diphenhydramine HCl  25 mg  10/14/20 04:24  10/20/20 20:11





  Diphenhydramine 25 Mg Cap  PO   25 mg





  HSPRN PRN   Administration





  Insomnia  


 


Dorzolamide/Timolol  1 drop  10/13/20 09:00  10/23/20 20:38





  Dorzolamide/Timolol 2%/0.5% Ophth Soln 10 Ml Bottle  EA EYE   1 each





  TID SAIDA   Administration


 


Enoxaparin Sodium  70 mg  10/24/20 09:00  10/24/20 08:19





  Enoxaparin Sodium 80 Mg/0.8 Ml Syringe  SC   70 mg





  BID SAIDA   Administration


 


Fish Oil  3,000 mg  10/13/20 09:00  10/24/20 08:21





  Fish Oil 1,000 Mg Cap  PO   3,000 mg





  DAILY SAIDA   Administration


 


Guaifenesin  600 mg  10/13/20 21:00  10/24/20 08:22





  Guaifenesin Er 600 Mg Tab  PO   600 mg





  Q12HR SAIDA   Administration


 


Haloperidol Lactate  5 mg  10/22/20 13:46  10/22/20 14:26





  Haloperidol Lactate 5 Mg/Ml Vial  IM   5 mg





  Q4H PRN   Administration





  Agitation  


 


Hydralazine HCl  50 mg  10/13/20 09:00  10/24/20 08:21





  Hydralazine 25 Mg Tab  PO   50 mg





  TID SAIDA   Administration


 


Insulin Glargine 45 units/  0.45 mls @ 0 mls/hr  10/13/20 09:00  10/24/20 08:20





  Miscellaneous Medication  SC   0.45 mls





  BID SAIDA   Administration


 


Meropenem 1 gm/ Device  50 mls @ 100 mls/hr  10/20/20 22:00  10/23/20 20:37





  IVPB   50 mls





  1000,2200 SAIDA   Administration


 


Sodium Chloride  1,000 mls @ 70 mls/hr  10/20/20 15:00  10/24/20 05:02





  Normal Saline 0.9%  IV   1,000 mls





  .[id number] SAIDA   Administration


 


Fentanyl Citrate 2,000 mcg/  100 mls @ 0 mls/hr  10/23/20 09:45  10/24/20 00:49





  Sodium Chloride  IV  11/22/20 09:45  100 mls





  INF SAIDA   Administration





  Protocol  





  Per Protocol  


 


Insulin Human Lispro  0 units  10/12/20 14:15  10/17/20 05:08





  Humalog 300 Units/3 Ml Vial  SC   3 unit





  .MILD SLIDING SCALE PRN   Administration





  Mild Correctional Scale  


 


Insulin Human Lispro  0 units  10/12/20 21:21  10/17/20 22:19





  Humalog 300 Units/3 Ml Vial  SC   2 unit





  .BEDTIME SLIDING SC PRN   Administration





  Bedtime Correctional Scale  


 


Isosorbide Dinitrate  20 mg  10/22/20 21:00  10/24/20 08:22





  Isosorbide Dinitrate 20 Mg Tab  PO   20 mg





  BID SAIDA   Administration


 


Labetalol HCl  10 mg  10/12/20 14:18  10/14/20 01:50





  Labetalol Hcl 100 Mg/20 Ml Vial  SLOW IVP   2 ml





  Q4H PRN   Administration





  SBP Greater Than 180  


 


Levothyroxine Sodium  25 mcg  10/17/20 06:00  10/24/20 05:02





  Levothyroxine Sodium 25 Mcg Tab  PO   25 mcg





  0600 SAIDA   Administration


 


Lorazepam  2 mg  10/23/20 09:45  10/23/20 17:16





  Lorazepam 2 Mg/Ml Vial  SLOW IVP  11/22/20 09:45  2 mg





  Q1H PRN   Administration





  Breakthrough agitation  


 


Melatonin  3 mg  10/14/20 02:12  10/17/20 22:18





  Melatonin 3 Mg Tab  PO   3 mg





  HSPRN PRN   Administration





  Insomnia  


 


Melatonin  3 mg  10/16/20 15:40  10/20/20 20:12





  Melatonin 3 Mg Tab  PO   3 mg





  HS PRN   Administration





  Insomnia  


 


Methylprednisolone Sodium Succinate  40 mg  10/17/20 21:00  10/24/20 08:20





  Methylprednisolone Sod Succ 40 Mg Vial  IVP   40 mg





  Q12HR SAIDA   Administration


 


Metoprolol Tartrate  50 mg  10/13/20 09:00  10/24/20 08:22





  Metoprolol Tartrate 50 Mg Tab  PO   50 mg





  BID SAIDA   Administration


 


Pantoprazole Sodium  40 mg  10/13/20 09:00  10/24/20 08:21





  Pantoprazole 40 Mg Tab  PO   40 mg





  DAILY SAIDA   Administration


 


Polyvinyl Alcohol/Povidone  1 each  10/13/20 09:00  10/24/20 08:24





  Polyvinyl Alcohol 1.4%/Povidone 0.6% Opth Drops  EA EYE   1 each





  DAILY SAIDA   Administration


 


Propofol  1,000 mg  10/23/20 09:45  10/23/20 20:28





  Propofol 1,000 Mg/100 Ml Vial  IV  11/22/20 09:45  1,000 mg





  INF PRN   Administration





  TO ACHIEVE GOAL RASS  





  Protocol  


 


Propylene Glycol  1 drop  10/13/20 09:00  10/23/20 20:36





  Polyethylene Glycol Opth Drop 15 Ml Bot  EA EYE   1 each





  BID SAIDA   Administration


 


Simvastatin  10 mg  10/19/20 21:00  10/23/20 20:34





  Simvastatin 10 Mg Tab  PO   10 mg





  2100 SAIDA   Administration


 


Sodium Bicarbonate  650 mg  10/16/20 15:00  10/24/20 08:22





  Sodium Bicarbonate Tab 325 Mg Tab  PO   650 mg





  TID SAIDA   Administration


 


Sodium Chloride  10 ml  10/13/20 09:00  10/24/20 08:26





  Flush - Normal Saline 10 Ml Syringe  IVF   10 ml





  Q12HR SAIDA   Administration


 


Sodium Polystyrene Sulfonate  30 gm  10/24/20 08:00  10/24/20 08:25





  Sodium Polystyrene Sulfonate 15 Gm/60 Ml Bot  OK  10/24/20 10:00  30 gr





  NOW SAIDA   Administration


 


Throat Lozenges  1 gloria  10/18/20 23:28  10/19/20 00:13





  Cepastat Lozenges 1 Gloria  PO   1 gloria





  Q2H PRN   Administration





  Sore Throat  


 


Vecuronium Bromide  10 mg  10/23/20 11:13  10/24/20 07:21





  Vecuronium 10 Mg Vial  IV   10 mg





  Q1H PRN   Administration





   SEDATION  


 


Zinc Sulfate  220 mg  10/16/20 09:00  10/24/20 08:22





  Zinc Sulfate 220 Mg Cap  PO   220 mg





  DAILY SAIDA   Administration














- Exam


General Appearance: ill appearing


General - other findings: sedated on vent


ENT - other findings: ET tube in place


Heart: RRR, no murmur, no gallops, no rubs


Respiratory - other findings: decent air movement with ventilations


Gastrointestinal: soft, non-tender, normal bowel sounds


Extremities: no edema


Psychiatric - other findings: sedated on vent





Hosp A/P





- Plan





Patient is a pleasant 64 years old female with multiple comorbidities including 

diabetes type 2, hypertension, chronic systolic heart failure, hypertension who 

was diagnosed with Covid approximately 3 days ago, presented to ED with 

worsening dyspnea.





Acute hypoxic respiratory failure secondary to COVID-19


The patient has been requiring more oxygen over about 3 days and now she has bee

n intubated since 4/23/2020.


She remains on corticosteroids and status post convalescent plasma.


Continue broad-spectrum antibiotics.


Chest x-ray showed worsening infiltrates








RAMONA on CKD stage III


Creatinine level stable.








Diabetes type 2


--BG elevated d/t steroid. Resume Lantus, cont ISS





Hypothyroidism


--cont Levothyroxine 





Klebsiella UTI, poa


--cont IV abx as above 





DVT ppx: Unable to take Eliquis so switched to Lovenox since intubation


GI ppx: PPI


Code Status: Full code


Prognosis guarded, poor chance of recovery
- Subjective


Encounter Date: 10/25/20


Encounter Time: 10:30


Subjective: 


Patient oxygenating a bit better after prone positioning. Otherwise without 

changes overnight.





- Objective


Vital Signs & Weight: 


                             Vital Signs (12 hours)











  Temp Pulse Resp BP


 


 10/25/20 07:53   66  


 


 10/25/20 04:00  97.4 F L   28 H 


 


 10/25/20 02:08   65   133/62


 


 10/25/20 02:00    28 H 


 


 10/25/20 00:00  99.6 F   28 H 


 


 10/24/20 22:27   63   132/61


 


 10/24/20 22:00    28 H 








                                     Weight











Admit Weight                   176 lb 8 oz


 


Weight                         178 lb 6.4 oz











                            Most Recent Monitor Data











Heart Rate from ECG            64


 


NIBP                           156/67


 


NIBP BP-Mean                   96


 


Respiration from ECG           28


 


SpO2                           100














I&O: 


                                        











 10/24/20 10/25/20 10/26/20





 06:59 06:59 06:59


 


Intake Total 2059.4 1803 


 


Output Total 1270 1500 


 


Balance 789.4 303 











Result Diagrams: 


                                 10/25/20 03:33





                                 10/25/20 03:33


Additional Labs: 


                                   Accuchecks











  10/24/20 10/23/20 10/23/20





  12:00 21:57 10:52


 


POC Glucose  97  106 H  106 H











Radiology Reviewed by me: Yes (diffuse airspace opacities, mild improv aeration)





Hospitalist ROS





- Review of Systems


ROS unobtainable: due to endotracheal tube





- Medication


Medications: 


Active Medications











Generic Name Dose Route Start Last Admin





  Trade Name Freq  PRN Reason Stop Dose Admin


 


Acetaminophen  650 mg  10/12/20 14:11  10/17/20 22:18





  Acetaminophen 325 Mg Tab  PO   650 mg





  Q4H PRN   Administration





  Headache/Fever/Mild Pain (1-3)  


 


Amlodipine Besylate  5 mg  10/13/20 09:00  10/24/20 20:03





  Amlodipine 5 Mg Tab  PO   5 mg





  BID SAIDA   Administration


 


Ascorbic Acid  1,000 mg  10/15/20 21:00  10/24/20 20:03





  Ascorbic Acid 500 Mg Chewable Tablet  PO   1,000 mg





  BID SAIDA   Administration


 


Benzonatate  100 mg  10/13/20 09:28  10/14/20 08:40





  Benzonatate 100 Mg Cap  PO   100 mg





  Q4H PRN   Administration





  Cough  


 


Brimonidine Tartrate  1 drop  10/13/20 09:00  10/24/20 20:13





  Brimonidine Tartrate 0.2% Ophth Soln 5 Ml Bottle  R EYE   1 drop





  BID SAIDA   Administration


 


Calcitriol  0.25 mcg  10/13/20 09:00  10/24/20 09:43





  Calcitriol 0.25 Mcg Cap  PO   0.25 mcg





  DAILY SAIDA   Administration


 


Cholecalciferol  1,000 units  10/13/20 09:00  10/24/20 08:22





  Cholecalciferol 1,000 Units (25 Mcg) Tab  PO   1,000 units





  DAILY SAIDA   Administration


 


Cyclobenzaprine HCl  10 mg  10/16/20 12:19  10/17/20 11:26





  Cyclobenzaprine 10 Mg Tab  PO   10 mg





  TID PRN   Administration





  Muscle Spasm  


 


Dextrose/Water  25 gm  10/12/20 14:15  10/19/20 04:23





  Dextrose 50% Abboject 50 Ml Syringe  SLOW IVP   25 gm





  PRN PRN   Administration





  Hypoglycemia  


 


Diphenhydramine HCl  25 mg  10/14/20 04:24  10/20/20 20:11





  Diphenhydramine 25 Mg Cap  PO   25 mg





  HSPRN PRN   Administration





  Insomnia  


 


Dorzolamide/Timolol  1 drop  10/13/20 09:00  10/24/20 20:13





  Dorzolamide/Timolol 2%/0.5% Ophth Soln 10 Ml Bottle  EA EYE   1 each





  TID SAIDA   Administration


 


Fish Oil  3,000 mg  10/13/20 09:00  10/24/20 08:21





  Fish Oil 1,000 Mg Cap  PO   3,000 mg





  DAILY SAIDA   Administration


 


Guaifenesin  600 mg  10/13/20 21:00  10/24/20 20:03





  Guaifenesin Er 600 Mg Tab  PO   600 mg





  Q12HR SAIDA   Administration


 


Hydralazine HCl  50 mg  10/13/20 09:00  10/24/20 20:02





  Hydralazine 25 Mg Tab  PO   50 mg





  TID SAIDA   Administration


 


Insulin Glargine 45 units/  0.45 mls @ 0 mls/hr  10/13/20 09:00  10/24/20 20:05





  Miscellaneous Medication  SC   0.45 mls





  BID SAIDA   Administration


 


Meropenem 1 gm/ Device  50 mls @ 100 mls/hr  10/20/20 22:00  10/24/20 20:50





  IVPB   50 mls





  1000,2200 SAIDA   Administration


 


Fentanyl Citrate 2,000 mcg/  100 mls @ 0 mls/hr  10/23/20 09:45  10/24/20 20:41





  Sodium Chloride  IV  11/22/20 09:45  100 mls





  INF SAIDA   Administration





  Protocol  





  Per Protocol  


 


Insulin Human Lispro  0 units  10/12/20 14:15  10/17/20 05:08





  Humalog 300 Units/3 Ml Vial  SC   3 unit





  .MILD SLIDING SCALE PRN   Administration





  Mild Correctional Scale  


 


Insulin Human Lispro  0 units  10/12/20 21:21  10/17/20 22:19





  Humalog 300 Units/3 Ml Vial  SC   2 unit





  .BEDTIME SLIDING SC PRN   Administration





  Bedtime Correctional Scale  


 


Isosorbide Dinitrate  20 mg  10/22/20 21:00  10/24/20 20:17





  Isosorbide Dinitrate 20 Mg Tab  PO   20 mg





  BID [facility]   Administration


 


Labetalol HCl  10 mg  10/12/20 14:18  10/14/20 01:50





  Labetalol Hcl 100 Mg/20 Ml Vial  SLOW IVP   2 ml





  Q4H PRN   Administration





  SBP Greater Than 180  


 


Levothyroxine Sodium  25 mcg  10/17/20 06:00  10/25/20 04:34





  Levothyroxine Sodium 25 Mcg Tab  PO   25 mcg





  0600 [facility]   Administration


 


Lorazepam  2 mg  10/23/20 09:45  10/25/20 04:31





  Lorazepam 2 Mg/Ml Vial  SLOW IVP  11/22/20 09:45  2 mg





  Q1H PRN   Administration





  Breakthrough agitation  


 


Melatonin  3 mg  10/14/20 02:12  10/17/20 22:18





  Melatonin 3 Mg Tab  PO   3 mg





  HSPRN PRN   Administration





  Insomnia  


 


Melatonin  3 mg  10/16/20 15:40  10/20/20 20:12





  Melatonin 3 Mg Tab  PO   3 mg





  HS PRN   Administration





  Insomnia  


 


Methylprednisolone Sodium Succinate  40 mg  10/17/20 21:00  10/24/20 20:02





  Methylprednisolone Sod Succ 40 Mg Vial  IVP   40 mg





  Q12HR [facility]   Administration


 


Metoprolol Tartrate  50 mg  10/13/20 09:00  10/24/20 20:03





  Metoprolol Tartrate 50 Mg Tab  PO   50 mg





  BID [facility]   Administration


 


Pantoprazole Sodium  40 mg  10/13/20 09:00  10/24/20 08:21





  Pantoprazole 40 Mg Tab  PO   40 mg





  DAILY [facility]   Administration


 


Polyvinyl Alcohol/Povidone  1 each  10/13/20 09:00  10/24/20 08:24





  Polyvinyl Alcohol 1.4%/Povidone 0.6% Opth Drops  EA EYE   1 each





  DAILY [facility]   Administration


 


Propofol  1,000 mg  10/23/20 09:45  10/24/20 20:06





  Propofol 1,000 Mg/100 Ml Vial  IV  11/22/20 09:45  1,000 mg





  INF PRN   Administration





  TO ACHIEVE GOAL RASS  





  Protocol  


 


Propylene Glycol  1 drop  10/13/20 09:00  10/24/20 20:13





  Polyethylene Glycol Opth Drop 15 Ml Bot  EA EYE   1 each





  BID [facility]   Administration


 


Simvastatin  10 mg  10/19/20 21:00  10/24/20 20:04





  Simvastatin 10 Mg Tab  PO   10 mg





  2100 [facility]   Administration


 


Sodium Bicarbonate  650 mg  10/16/20 15:00  10/24/20 20:03





  Sodium Bicarbonate Tab 325 Mg Tab  PO   650 mg





  TID [facility]   Administration


 


Sodium Chloride  10 ml  10/13/20 09:00  10/24/20 20:04





  Flush - Normal Saline 10 Ml Syringe  IVF   10 ml





  Q12HR [facility]   Administration


 


Throat Lozenges  1 gloria  10/18/20 23:28  10/19/20 00:13





  Cepastat Lozenges 1 Gloria  PO   1 glroia





  Q2H PRN   Administration





  Sore Throat  


 


Vecuronium Bromide  10 mg  10/23/20 11:13  10/25/20 04:31





  Vecuronium 10 Mg Vial  IV   10 mg





  Q1H PRN   Administration





   SEDATION  


 


Zinc Sulfate  220 mg  10/16/20 09:00  10/24/20 08:22





  Zinc Sulfate 220 Mg Cap  PO   220 mg





  DAILY SAIDA   Administration














- Exam


General - other findings: unresponsive on the vent


Heart: RRR, no murmur, no gallops, no rubs


Respiratory - other findings: junky breath sounds from ET tube, decent air 

movement bilaterally


Gastrointestinal: soft, non-distended, normal bowel sounds


Neurological: no focal deficits


Psychiatric - other findings: sedated on the vent





[facility]





- Plan





Patient is a pleasant 64 years old female with multiple comorbidities including 

diabetes type 2, hypertension, chronic systolic heart failure, hypertension who 

was diagnosed with Covid approximately 3 days before admission, presented to ED 

with worsening dyspnea.





Acute hypoxic respiratory failure secondary to COVID-19


The patient has been requiring more oxygen over about 3 days and now she has 

been intubated since 4/23/2020.


She remains on corticosteroids and status post convalescent plasma.


Continue broad-spectrum antibiotics.


Chest x-ray with persistent infiltrates, improved aeration after prone 

positioning yesterday








RAMONA on CKD stage III


Creatinine level stable.








Diabetes type 2


--BG elevated d/t steroid. Resume Lantus, cont ISS





Hypothyroidism


--cont Levothyroxine 





Klebsiella UTI, poa


--cont IV abx as above 





Thrombocytopenia- mild


--Switched off Lovenox to Fondaparinux





DVT ppx: Unable to take Eliquis so switched to Lovenox since intubation, now on 

Fondaparinux


GI ppx: PPI


Code Status: Full code


Prognosis guarded, poor chance of recovery
- Subjective


Encounter Date: 10/26/20


Encounter Time: 10:00


Subjective: 


Patient remains sedated on the vent. O2 sats not doing as well today. Blood 

sugars dropped yesterday morning, Lantus held and started on D5. Blood sugars 

now in 300s.





- Objective


Vital Signs & Weight: 


                             Vital Signs (12 hours)











  Temp Pulse Resp BP Pulse Ox


 


 10/26/20 07:31   97   128/66 


 


 10/26/20 06:00    26 H  


 


 10/26/20 04:00  98.3 F   26 H   96


 


 10/26/20 02:28   80   103/55 L 


 


 10/26/20 02:00    26 H  


 


 10/26/20 00:00    26 H  


 


 10/25/20 22:22   65   106/46 L 


 


 10/25/20 22:00    26 H  








                                     Weight











Admit Weight                   176 lb 8 oz


 


Weight                         191 lb 5.78 oz











                            Most Recent Monitor Data











Heart Rate from ECG            91


 


NIBP                           116/62


 


NIBP BP-Mean                   80


 


Respiration from ECG           26


 


SpO2                           91














I&O: 


                                        











 10/25/20 10/26/20 10/27/20





 06:59 06:59 06:59


 


Intake Total 1803 2665.2 


 


Output Total 1500 1210 


 


Balance 303 1455.2 











Result Diagrams: 


                                 10/26/20 03:09





                                 10/26/20 03:09


Additional Labs: 


                                   Accuchecks











  10/26/20 10/26/20 10/25/20





  06:17 00:22 14:05


 


POC Glucose  320 H  217 H  83














  10/25/20 10/25/20





  12:24 10:38


 


POC Glucose  166 H  69 L














Hospitalist ROS





- Review of Systems


ROS unobtainable: due to endotracheal tube





- Medication


Medications: 


Active Medications











Generic Name Dose Route Start Last Admin





  Trade Name Freq  PRN Reason Stop Dose Admin


 


Acetaminophen  650 mg  10/12/20 14:11  10/17/20 22:18





  Acetaminophen 325 Mg Tab  PO   650 mg





  Q4H PRN   Administration





  Headache/Fever/Mild Pain (1-3)  


 


Amlodipine Besylate  5 mg  10/13/20 09:00  10/25/20 19:58





  Amlodipine 5 Mg Tab  PO   5 mg





  BID SAIDA   Administration


 


Ascorbic Acid  1,000 mg  10/15/20 21:00  10/25/20 19:56





  Ascorbic Acid 500 Mg Chewable Tablet  PO   1,000 mg





  BID SAIDA   Administration


 


Benzonatate  100 mg  10/13/20 09:28  10/14/20 08:40





  Benzonatate 100 Mg Cap  PO   100 mg





  Q4H PRN   Administration





  Cough  


 


Brimonidine Tartrate  1 drop  10/13/20 09:00  10/25/20 19:58





  Brimonidine Tartrate 0.2% Ophth Soln 5 Ml Bottle  R EYE   1 drop





  BID SAIDA   Administration


 


Calcitriol  0.25 mcg  10/13/20 09:00  10/25/20 09:36





  Calcitriol 0.25 Mcg Cap  PO   0.25 mcg





  DAILY SAIDA   Administration


 


Cholecalciferol  1,000 units  10/13/20 09:00  10/25/20 09:50





  Cholecalciferol 1,000 Units (25 Mcg) Tab  PO   1,000 units





  DAILY SAIDA   Administration


 


Cyclobenzaprine HCl  10 mg  10/16/20 12:19  10/17/20 11:26





  Cyclobenzaprine 10 Mg Tab  PO   10 mg





  TID PRN   Administration





  Muscle Spasm  


 


Dextrose/Water  25 gm  10/12/20 14:15  10/25/20 11:47





  Dextrose 50% Abboject 50 Ml Syringe  SLOW IVP   25 gm





  PRN PRN   Administration





  Hypoglycemia  


 


Diphenhydramine HCl  25 mg  10/14/20 04:24  10/20/20 20:11





  Diphenhydramine 25 Mg Cap  PO   25 mg





  HSPRN PRN   Administration





  Insomnia  


 


Dorzolamide/Timolol  1 drop  10/13/20 09:00  10/25/20 19:58





  Dorzolamide/Timolol 2%/0.5% Ophth Soln 10 Ml Bottle  EA EYE   1 each





  TID SAIDA   Administration


 


Fish Oil  3,000 mg  10/13/20 09:00  10/25/20 09:32





  Fish Oil 1,000 Mg Cap  PO   3,000 mg





  DAILY SAIDA   Administration


 


Fondaparinux  5 mg  10/26/20 06:00  10/26/20 06:08





  Fondaparinux Sodium 10 Mg/0.8 Ml Syringe  SC   5 mg





  DAILY@0600 SAIDA   Administration


 


Guaifenesin  600 mg  10/13/20 21:00  10/25/20 19:59





  Guaifenesin Er 600 Mg Tab  PO   600 mg





  Q12HR SAIDA   Administration


 


Hydralazine HCl  50 mg  10/13/20 09:00  10/25/20 19:57





  Hydralazine 25 Mg Tab  PO   50 mg





  TID SAIDA   Administration


 


Insulin Glargine 45 units/  0.45 mls @ 0 mls/hr  10/13/20 09:00  10/25/20 22:11





  Miscellaneous Medication  SC   Not Given





  BID SAIDA  


 


Meropenem 1 gm/ Device  50 mls @ 100 mls/hr  10/20/20 22:00  10/25/20 22:10





  IVPB   50 mls





  1000,2200 SAIDA   Administration


 


Fentanyl Citrate 2,000 mcg/  100 mls @ 0 mls/hr  10/23/20 09:45  10/26/20 04:13





  Sodium Chloride  IV  11/22/20 09:45  100 mls





  INF SAIDA   Administration





  Protocol  





  Per Protocol  


 


Dextrose/Water  1,000 mls @ 75 mls/hr  10/25/20 08:00  10/25/20 22:10





  D5w  IV   1,000 mls





  .[id number] SAIDA   Administration


 


Insulin Human Lispro  0 units  10/12/20 14:15  10/26/20 06:37





  Humalog 300 Units/3 Ml Vial  SC   5 unit





  .MILD SLIDING SCALE PRN   Administration





  Mild Correctional Scale  


 


Insulin Human Lispro  0 units  10/12/20 21:21  10/17/20 22:19





  Humalog 300 Units/3 Ml Vial  SC   2 unit





  .BEDTIME SLIDING SC PRN   Administration





  Bedtime Correctional Scale  


 


Isosorbide Dinitrate  20 mg  10/22/20 21:00  10/25/20 19:55





  Isosorbide Dinitrate 20 Mg Tab  PO   20 mg





  BID SAIDA   Administration


 


Labetalol HCl  10 mg  10/12/20 14:18  10/14/20 01:50





  Labetalol Hcl 100 Mg/20 Ml Vial  SLOW IVP   2 ml





  Q4H PRN   Administration





  SBP Greater Than 180  


 


Levothyroxine Sodium  25 mcg  10/17/20 06:00  10/26/20 06:08





  Levothyroxine Sodium 25 Mcg Tab  PO   25 mcg





  0600 SAIDA   Administration


 


Lorazepam  2 mg  10/23/20 09:45  10/26/20 06:42





  Lorazepam 2 Mg/Ml Vial  SLOW IVP  11/22/20 09:45  2 mg





  Q1H PRN   Administration





  Breakthrough agitation  


 


Melatonin  3 mg  10/14/20 02:12  10/17/20 22:18





  Melatonin 3 Mg Tab  PO   3 mg





  HSPRN PRN   Administration





  Insomnia  


 


Melatonin  3 mg  10/16/20 15:40  10/20/20 20:12





  Melatonin 3 Mg Tab  PO   3 mg





  HS PRN   Administration





  Insomnia  


 


Methylprednisolone Sodium Succinate  40 mg  10/17/20 21:00  10/25/20 19:59





  Methylprednisolone Sod Succ 40 Mg Vial  IVP   40 mg





  Q12HR SAIDA   Administration


 


Metoprolol Tartrate  50 mg  10/13/20 09:00  10/25/20 19:55





  Metoprolol Tartrate 50 Mg Tab  PO   50 mg





  BID SAIDA   Administration


 


Pantoprazole Sodium  40 mg  10/13/20 09:00  10/25/20 09:35





  Pantoprazole 40 Mg Tab  PO   40 mg





  DAILY SAIDA   Administration


 


Polyvinyl Alcohol/Povidone  1 each  10/13/20 09:00  10/25/20 09:43





  Polyvinyl Alcohol 1.4%/Povidone 0.6% Opth Drops  EA EYE   1 each





  DAILY SAIDA   Administration


 


Propofol  1,000 mg  10/23/20 09:45  10/26/20 00:54





  Propofol 1,000 Mg/100 Ml Vial  IV  11/22/20 09:45  1,000 mg





  INF PRN   Administration





  TO ACHIEVE GOAL RASS  





  Protocol  


 


Propylene Glycol  1 drop  10/13/20 09:00  10/25/20 19:59





  Polyethylene Glycol Opth Drop 15 Ml Bot  EA EYE   1 each





  BID SAIDA   Administration


 


Scopolamine  1.5 mg  10/25/20 11:00  10/25/20 11:32





  Scopolamine 1.5 Mg/72 Hour Patch  TD   1.5 mg





  Q3D SAIDA   Administration


 


Simvastatin  10 mg  10/19/20 21:00  10/25/20 19:56





  Simvastatin 10 Mg Tab  PO   10 mg





  2100 SAIDA   Administration


 


Sodium Bicarbonate  650 mg  10/16/20 15:00  10/25/20 19:55





  Sodium Bicarbonate Tab 325 Mg Tab  PO   650 mg





  TID SAIDA   Administration


 


Sodium Chloride  10 ml  10/13/20 09:00  10/25/20 19:57





  Flush - Normal Saline 10 Ml Syringe  IVF   10 ml





  Q12HR SAIDA   Administration


 


Throat Lozenges  1 gloria  10/18/20 23:28  10/19/20 00:13





  Cepastat Lozenges 1 Gloria  PO   1 gloria





  Q2H PRN   Administration





  Sore Throat  


 


Vecuronium Bromide  10 mg  10/23/20 11:13  10/26/20 06:42





  Vecuronium 10 Mg Vial  IV   10 mg





  Q1H PRN   Administration





   SEDATION  


 


Zinc Sulfate  220 mg  10/16/20 09:00  10/25/20 09:38





  Zinc Sulfate 220 Mg Cap  PO   220 mg





  DAILY SAIDA   Administration














- Exam


General - other findings: sedated on the vent


ENT: moist mucosa


Heart: RRR, no murmur, no gallops, no rubs


Respiratory - other findings: coarse breath sounds bilaterally


Gastrointestinal: soft, non-tender, non-distended, normal bowel sounds


Extremities: no edema


Psychiatric - other findings: sedated on vent





[facility]





- Plan





Patient is a pleasant 64 years old female with multiple comorbidities including 

diabetes type 2, hypertension, chronic systolic heart failure, hypertension who 

was diagnosed with Covid approximately 3 days before admission, presented to ED 

with worsening dyspnea.





Acute hypoxic respiratory failure secondary to COVID-19


The patient has been requiring more oxygen over about 3 days and now she has 

been intubated since 4/23/2020.


She remains on corticosteroids and status post convalescent plasma.


Continue broad-spectrum antibiotics.


Chest x-ray with persistent infiltrates, improved aeration after prone 

positioning yesterday








RAMONA on CKD stage III


Creatinine level stable.





Hyperkalemia


--running mildly high, stable





Diabetes type 2


--BG elevated d/t steroid. Resumed Lantus, cont ISS, had a drop yesterday but 

now very elevated again after starting dextrose in fluids. Will stop fluids and 

restart Lantus as half dose.





Hypothyroidism


--cont Levothyroxine 





Klebsiella UTI, poa


--cont IV abx as above 





Thrombocytopenia- mild


--Switched off Lovenox to Fondaparinux





DVT ppx: Unable to take Eliquis so switched to Lovenox since intubation, now on 

Fondaparinux


GI ppx: PPI


Code Status: Full code


Prognosis guarded, poor chance of recovery
- Subjective


Encounter Date: 10/27/20


Encounter Time: 10:00


Subjective: 


No changes on the vent overnight.





- Objective


Vital Signs & Weight: 


                             Vital Signs (12 hours)











  Pulse Resp BP Pulse Ox


 


 10/27/20 06:27  79   143/60 H 


 


 10/27/20 06:00   26 H  


 


 10/27/20 04:00   26 H  


 


 10/27/20 02:00  81  26 H  


 


 10/27/20 00:00   26 H  


 


 10/26/20 22:00   26 H  


 


 10/26/20 21:58  71   


 


 10/26/20 20:26  68   


 


 10/26/20 20:23  90   148/71 H 


 


 10/26/20 20:00   26 H   93 L








                                     Weight











Admit Weight                   176 lb 8 oz


 


Weight                         191 lb 5.78 oz











                            Most Recent Monitor Data











Heart Rate from ECG            79


 


NIBP                           143/60


 


NIBP BP-Mean                   87


 


Respiration from ECG           26


 


SpO2                           96














I&O: 


                                        











 10/26/20 10/27/20 10/28/20





 06:59 06:59 06:59


 


Intake Total 2665.2 1699.4 


 


Output Total 1210 1370 


 


Balance 1455.2 329.4 











Result Diagrams: 


                                 10/27/20 03:24





                                 10/27/20 11:58


Additional Labs: 


                                   Accuchecks











  10/27/20 10/27/20 10/26/20





  05:58 02:34 20:40


 


POC Glucose  259 H  241 H  209 H














  10/26/20 10/25/20 10/25/20





  12:26 20:13 00:25


 


POC Glucose  257 H  227 H  85














  10/24/20 10/22/20 10/22/20





  18:04 17:34 11:32


 


POC Glucose  89  131 H  152 H














Hospitalist ROS





- Review of Systems


ROS unobtainable: due to endotracheal tube





- Medication


Medications: 


Active Medications











Generic Name Dose Route Start Last Admin





  Trade Name Freq  PRN Reason Stop Dose Admin


 


Acetaminophen  650 mg  10/12/20 14:11  10/17/20 22:18





  Acetaminophen 325 Mg Tab  PO   650 mg





  Q4H PRN   Administration





  Headache/Fever/Mild Pain (1-3)  


 


Amlodipine Besylate  5 mg  10/13/20 09:00  10/26/20 20:23





  Amlodipine 5 Mg Tab  PO   5 mg





  BID SAIDA   Administration


 


Ascorbic Acid  1,000 mg  10/15/20 21:00  10/26/20 20:24





  Ascorbic Acid 500 Mg Chewable Tablet  PO   1,000 mg





  BID SAIDA   Administration


 


Benzonatate  100 mg  10/13/20 09:28  10/14/20 08:40





  Benzonatate 100 Mg Cap  PO   100 mg





  Q4H PRN   Administration





  Cough  


 


Brimonidine Tartrate  1 drop  10/13/20 09:00  10/26/20 20:24





  Brimonidine Tartrate 0.2% Ophth Soln 5 Ml Bottle  R EYE   1 drop





  BID SAIDA   Administration


 


Calcitriol  0.25 mcg  10/13/20 09:00  10/26/20 08:55





  Calcitriol 0.25 Mcg Cap  PO   0.25 mcg





  DAILY SAIDA   Administration


 


Cholecalciferol  1,000 units  10/13/20 09:00  10/26/20 09:06





  Cholecalciferol 1,000 Units (25 Mcg) Tab  PO   1,000 units





  DAILY SAIDA   Administration


 


Cyclobenzaprine HCl  10 mg  10/16/20 12:19  10/17/20 11:26





  Cyclobenzaprine 10 Mg Tab  PO   10 mg





  TID PRN   Administration





  Muscle Spasm  


 


Dextrose/Water  25 gm  10/12/20 14:15  10/25/20 11:47





  Dextrose 50% Abboject 50 Ml Syringe  SLOW IVP   25 gm





  PRN PRN   Administration





  Hypoglycemia  


 


Diphenhydramine HCl  25 mg  10/14/20 04:24  10/20/20 20:11





  Diphenhydramine 25 Mg Cap  PO   25 mg





  HSPRN PRN   Administration





  Insomnia  


 


Dorzolamide/Timolol  1 drop  10/13/20 09:00  10/26/20 20:24





  Dorzolamide/Timolol 2%/0.5% Ophth Soln 10 Ml Bottle  EA EYE   1 each





  TID SAIDA   Administration


 


Fish Oil  3,000 mg  10/13/20 09:00  10/26/20 08:55





  Fish Oil 1,000 Mg Cap  PO   3,000 mg





  DAILY SAIDA   Administration


 


Fondaparinux  5 mg  10/26/20 06:00  10/27/20 05:50





  Fondaparinux Sodium 10 Mg/0.8 Ml Syringe  SC   5 mg





  DAILY@0600 SAIDA   Administration


 


Guaifenesin  600 mg  10/13/20 21:00  10/26/20 20:26





  Guaifenesin Er 600 Mg Tab  PO   600 mg





  Q12HR SAIDA   Administration


 


Hydralazine HCl  50 mg  10/13/20 09:00  10/26/20 20:26





  Hydralazine 25 Mg Tab  PO   50 mg





  TID SAIDA   Administration


 


Meropenem 1 gm/ Device  50 mls @ 100 mls/hr  10/20/20 22:00  10/26/20 20:56





  IVPB   50 mls





  1000,2200 SAIDA   Administration


 


Fentanyl Citrate 2,000 mcg/  100 mls @ 0 mls/hr  10/23/20 09:45  10/26/20 23:30





  Sodium Chloride  IV  11/22/20 09:45  100 mls





  INF SAIDA   Administration





  Protocol  





  Per Protocol  


 


Insulin Glargine 25 units/  0.25 mls @ 0 mls/hr  10/26/20 21:00  10/26/20 20:26





  Miscellaneous Medication  SC   0.25 mls





  BID SAIDA   Administration


 


Insulin Human Lispro  0 units  10/12/20 14:15  10/27/20 02:47





  Humalog 300 Units/3 Ml Vial  SC   3 unit





  .MILD SLIDING SCALE PRN   Administration





  Mild Correctional Scale  


 


Insulin Human Lispro  0 units  10/12/20 21:21  10/17/20 22:19





  Humalog 300 Units/3 Ml Vial  SC   2 unit





  .BEDTIME SLIDING SC PRN   Administration





  Bedtime Correctional Scale  


 


Isosorbide Dinitrate  20 mg  10/22/20 21:00  10/26/20 20:26





  Isosorbide Dinitrate 20 Mg Tab  PO   20 mg





  BID SAIDA   Administration


 


Labetalol HCl  10 mg  10/12/20 14:18  10/14/20 01:50





  Labetalol Hcl 100 Mg/20 Ml Vial  SLOW IVP   2 ml





  Q4H PRN   Administration





  SBP Greater Than 180  


 


Levothyroxine Sodium  25 mcg  10/17/20 06:00  10/27/20 05:50





  Levothyroxine Sodium 25 Mcg Tab  PO   25 mcg





  0600 SAIDA   Administration


 


Lorazepam  2 mg  10/23/20 09:45  10/26/20 06:42





  Lorazepam 2 Mg/Ml Vial  SLOW IVP  11/22/20 09:45  2 mg





  Q1H PRN   Administration





  Breakthrough agitation  


 


Melatonin  3 mg  10/14/20 02:12  10/17/20 22:18





  Melatonin 3 Mg Tab  PO   3 mg





  HSPRN PRN   Administration





  Insomnia  


 


Melatonin  3 mg  10/16/20 15:40  10/20/20 20:12





  Melatonin 3 Mg Tab  PO   3 mg





  HS PRN   Administration





  Insomnia  


 


Methylprednisolone Sodium Succinate  40 mg  10/17/20 21:00  10/26/20 20:26





  Methylprednisolone Sod Succ 40 Mg Vial  IVP   40 mg





  Q12HR SAIDA   Administration


 


Metoprolol Tartrate  50 mg  10/13/20 09:00  10/26/20 20:26





  Metoprolol Tartrate 50 Mg Tab  PO   50 mg





  BID SAIDA   Administration


 


Polyvinyl Alcohol/Povidone  1 each  10/13/20 09:00  10/26/20 09:08





  Polyvinyl Alcohol 1.4%/Povidone 0.6% Opth Drops  EA EYE   1 each





  DAILY SAIDA   Administration


 


Propofol  1,000 mg  10/23/20 09:45  10/26/20 15:15





  Propofol 1,000 Mg/100 Ml Vial  IV  11/22/20 09:45  1,000 mg





  INF PRN   Administration





  TO ACHIEVE GOAL RASS  





  Protocol  


 


Propylene Glycol  1 drop  10/13/20 09:00  10/26/20 20:24





  Polyethylene Glycol Opth Drop 15 Ml Bot  EA EYE   1 each





  BID SAIDA   Administration


 


Scopolamine  1.5 mg  10/25/20 11:00  10/25/20 11:32





  Scopolamine 1.5 Mg/72 Hour Patch  TD   1.5 mg





  Q3D SAIDA   Administration


 


Simvastatin  10 mg  10/19/20 21:00  10/26/20 20:26





  Simvastatin 10 Mg Tab  PO   10 mg





  2100 SAIDA   Administration


 


Sodium Bicarbonate  650 mg  10/16/20 15:00  10/26/20 20:27





  Sodium Bicarbonate Tab 325 Mg Tab  PO   650 mg





  TID SAIDA   Administration


 


Sodium Chloride  10 ml  10/13/20 09:00  10/26/20 23:31





  Flush - Normal Saline 10 Ml Syringe  IVF   10 ml





  Q12HR SAIDA   Administration


 


Sterile Water  1 ml  10/17/20 11:00  10/27/20 05:49





  Bacteriostatic Water 30 Ml Vial  FS   1 ml





  PRN PRN   Administration





  RECONSTITUTION  


 


Throat Lozenges  1 gloria  10/18/20 23:28  10/19/20 00:13





  Cepastat Lozenges 1 Gloria  PO   1 gloria





  Q2H PRN   Administration





  Sore Throat  


 


Vecuronium Bromide  10 mg  10/23/20 11:13  10/27/20 05:49





  Vecuronium 10 Mg Vial  IV   10 mg





  Q1H PRN   Administration





   SEDATION  


 


Zinc Sulfate  220 mg  10/16/20 09:00  10/26/20 08:55





  Zinc Sulfate 220 Mg Cap  PO   220 mg





  DAILY SAIDA   Administration














- Exam


General - other findings: sedated on the vent


ENT: moist mucosa


Heart: RRR, no murmur, no gallops, no rubs


Respiratory - other findings: less wet breath sounds compared to yesterday


Gastrointestinal: soft, non-tender, non-distended, normal bowel sounds


Extremities: no edema


Psychiatric - other findings: sedated on the vent





[facility]





- Plan





Patient is a pleasant 64 years old female with multiple comorbidities including 

diabetes type 2, hypertension, chronic systolic heart failure, hypertension who 

was diagnosed with Covid approximately 3 days before admission, presented to ED 

with worsening dyspnea.





Acute hypoxic respiratory failure secondary to COVID-19


The patient has been requiring more oxygen over about 3 days and now she has 

been intubated since 4/23/2020.


She remains on corticosteroids and status post convalescent plasma.


Continue broad-spectrum antibiotics.


Chest x-ray with persistent infiltrates, improved aeration after prone 

positioning but still requiring high doses FiO2





Starting tube feeding





RAMONA on CKD stage III


Creatinine level stable.





Hyperkalemia


--running mildly high, stable





Diabetes type 2


--BG elevated d/t steroid. Resumed Lantus, cont ISS, had a drop yesterday but 

now very elevated again after starting dextrose in fluids. Fluids stopped, 

started at half dose Lantus, titrating up as tolerated.





Hypothyroidism


--cont Levothyroxine 





Klebsiella UTI, poa


--fully treated the Cefepime and now Meropenem





Thrombocytopenia- mild


--Switched off Lovenox to Fondaparinux





DVT ppx: Unable to take Eliquis so switched to Lovenox since intubation, now on 

Fondaparinux


GI ppx: PPI


Code Status: Full code


Prognosis guarded, poor chance of recovery
- Subjective


Encounter Date: 10/28/20


Encounter Time: 18:50


Subjective: 


f/u for resp failure with COVID PNA and unable to wean. Receiving 

Solumedrol/Duonebs but not weanable. 





- Objective


Vital Signs & Weight: 


                             Vital Signs (12 hours)











  Temp Pulse Resp BP


 


 10/28/20 18:17   71  


 


 10/28/20 18:00    35 H 


 


 10/28/20 16:00  98.2 F   26 H 


 


 10/28/20 15:58   80   127/58 L


 


 10/28/20 14:35   80   127/58 L


 


 10/28/20 14:00    26 H 


 


 10/28/20 12:00  98.5 F   26 H 


 


 10/28/20 10:32   75   105/51 L


 


 10/28/20 10:00    26 H 


 


 10/28/20 08:00  97.7 F   41 H 


 


 10/28/20 07:28   79   143/63 H


 


 10/28/20 07:27   79   143/63 H








                                     Weight











Admit Weight                   176 lb 8 oz


 


Weight                         191 lb 5.78 oz











                            Most Recent Monitor Data











Heart Rate from ECG            72


 


NIBP                           116/53


 


NIBP BP-Mean                   74


 


Respiration from ECG           26


 


SpO2                           99














I&O: 


                                        











 10/27/20 10/28/20 10/29/20





 06:59 06:59 06:59


 


Intake Total 1699.4 2392.1 2106


 


Output Total 1370 1125 670


 


Balance 329.4 1267.1 1436











Result Diagrams: 


                                 10/28/20 05:17





                                 10/28/20 05:17


Additional Labs: 


                                   Accuchecks











  10/28/20 10/28/20 10/28/20





  15:51 10:39 05:22


 


POC Glucose  262 H  180 H  259 H














  10/27/20 10/25/20





  23:25 11:47


 


POC Glucose  276 H  27 L*








Microbiology





10/14/20 09:45   Urine voided   Urine Culture - Final


                              Klebsiella pneumoniae ssp pneu


10/12/20 11:42   Venous blood - Right Arm   Blood Culture - Final


                              NO GROWTH IN 5 DAYS


10/12/20 11:34   Venous blood - Left Arm   Blood Culture - Final


                              NO GROWTH IN 5 DAYS





                                Laboratory Tests











  10/12/20 10/23/20 10/23/20





  14:59 03:29 03:29


 


D-Dimer    18.59 H


 


Potassium   


 


Creatinine   


 


Ferritin   633.47 H 


 


C-Reactive Protein   


 


SARS-CoV-2 (PCR)  DETECTED A*  














  10/23/20 10/23/20 10/24/20





  10:46 10:46 03:12


 


D-Dimer   


 


Potassium    5.8 H


 


Creatinine  1.58 H   1.87 H


 


Ferritin   


 


C-Reactive Protein   7.82 H 


 


SARS-CoV-2 (PCR)   














  10/24/20 10/24/20 10/24/20





  03:12 03:12 03:12


 


D-Dimer   15.59 H 


 


Potassium   


 


Creatinine   


 


Ferritin    852.02 H


 


C-Reactive Protein  9.65 H  


 


SARS-CoV-2 (PCR)   














  10/24/20 10/25/20 10/25/20





  17:45 03:33 03:33


 


D-Dimer    11.25 H


 


Potassium  5.5 H  5.5 H 


 


Creatinine   1.65 H 


 


Ferritin   


 


C-Reactive Protein   6.51 H 


 


SARS-CoV-2 (PCR)   














  10/25/20 10/26/20 10/26/20





  03:33 03:09 03:09


 


D-Dimer    11.35 H


 


Potassium   5.5 H 


 


Creatinine   1.78 H 


 


Ferritin  496.75 H  


 


C-Reactive Protein   3.80 H 


 


SARS-CoV-2 (PCR)   














  10/26/20 10/27/20 10/27/20





  03:09 11:58 11:58


 


D-Dimer   


 


Potassium   6.5 H 


 


Creatinine   1.77 H 


 


Ferritin  445.72 H   404.41 H


 


C-Reactive Protein   1.59 H 


 


SARS-CoV-2 (PCR)   














  10/27/20 10/28/20 10/28/20





  19:31 05:17 05:17


 


D-Dimer  4.90 H   3.67 H


 


Potassium   


 


Creatinine   


 


Ferritin   


 


C-Reactive Protein   1.01 H 


 


SARS-CoV-2 (PCR)   














  10/28/20





  05:17


 


D-Dimer 


 


Potassium 


 


Creatinine 


 


Ferritin  425.67 H


 


C-Reactive Protein 


 


SARS-CoV-2 (PCR) 











Radiology Reviewed by me: Yes (PCXR - extensive infiltrates bilat, lines/tubes 

in position)


EKG Reviewed by me: Yes (Tele - SR)





Hospitalist ROS





- Medication


Medications: 


Active Medications











Generic Name Dose Route Start Last Admin





  Trade Name Freq  PRN Reason Stop Dose Admin


 


Acetaminophen  650 mg  10/12/20 14:11  10/17/20 22:18





  Acetaminophen 325 Mg Tab  PO   650 mg





  Q4H PRN   Administration





  Headache/Fever/Mild Pain (1-3)  


 


Amlodipine Besylate  5 mg  10/13/20 09:00  10/28/20 07:28





  Amlodipine 5 Mg Tab  PO   5 mg





  BID SAIDA   Administration


 


Ascorbic Acid  1,000 mg  10/15/20 21:00  10/28/20 07:26





  Ascorbic Acid 500 Mg Chewable Tablet  PO   1,000 mg





  BID SAIDA   Administration


 


Brimonidine Tartrate  1 drop  10/13/20 09:00  10/28/20 07:31





  Brimonidine Tartrate 0.2% Ophth Soln 5 Ml Bottle  R EYE   1 drop





  BID SAIDA   Administration


 


Calcitriol  0.25 mcg  10/13/20 09:00  10/28/20 07:27





  Calcitriol 0.25 Mcg Cap  PO   0.25 mcg





  DAILY SAIDA   Administration


 


Cholecalciferol  1,000 units  10/13/20 09:00  10/28/20 07:31





  Cholecalciferol 1,000 Units (25 Mcg) Tab  PO   1,000 units





  DAILY SAIDA   Administration


 


Dextrose/Water  25 gm  10/12/20 14:15  10/25/20 11:47





  Dextrose 50% Abboject 50 Ml Syringe  SLOW IVP   25 gm





  PRN PRN   Administration





  Hypoglycemia  


 


Diphenhydramine HCl  25 mg  10/14/20 04:24  10/20/20 20:11





  Diphenhydramine 25 Mg Cap  PO   25 mg





  HSPRN PRN   Administration





  Insomnia  


 


Dorzolamide/Timolol  1 drop  10/13/20 09:00  10/28/20 15:52





  Dorzolamide/Timolol 2%/0.5% Ophth Soln 10 Ml Bottle  EA EYE   1 each





  TID SAIDA   Administration


 


Fondaparinux  5 mg  10/26/20 06:00  10/28/20 05:00





  Fondaparinux Sodium 10 Mg/0.8 Ml Syringe  SC   5 mg





  DAILY@0600 SAIDA   Administration


 


Guaifenesin  600 mg  10/13/20 21:00  10/28/20 07:28





  Guaifenesin Er 600 Mg Tab  PO   600 mg





  Q12HR SAIDA   Administration


 


Hydralazine HCl  50 mg  10/13/20 09:00  10/28/20 15:58





  Hydralazine 25 Mg Tab  PO   50 mg





  TID SAIDA   Administration


 


Fentanyl Citrate 2,000 mcg/  100 mls @ 0 mls/hr  10/23/20 09:45  10/28/20 12:06





  Sodium Chloride  IV  11/22/20 09:45  100 mls





  INF SAIDA   Administration





  Protocol  





  Per Protocol  


 


Insulin Glargine 35 units/  0.35 mls @ 0 mls/hr  10/27/20 21:00  10/28/20 09:09





  Miscellaneous Medication  SC   0.35 mls





  BID SAIDA   Administration


 


Sodium Bicarbonate 150 meq/  1,150 mls @ 125 mls/hr  10/28/20 12:17  10/28/20 

13:04





  Dextrose/Water  IV   Not Given





  .Q9H12M SAIDA  


 


Insulin Human Lispro  0 units  10/12/20 21:21  10/17/20 22:19





  Humalog 300 Units/3 Ml Vial  SC   2 unit





  .BEDTIME SLIDING SC PRN   Administration





  Bedtime Correctional Scale  


 


Insulin Human Lispro  0 units  10/28/20 10:45  10/28/20 15:53





  Humalog 300 Units/3 Ml Vial  SC   6 unit





  .MODERATE SLIDING SC PRN   Administration





  MODERATE SLIDING SCALE  





  Protocol  


 


Isosorbide Dinitrate  20 mg  10/22/20 21:00  10/28/20 07:27





  Isosorbide Dinitrate 20 Mg Tab  PO   20 mg





  BID SAIDA   Administration


 


Labetalol HCl  10 mg  10/12/20 14:18  10/14/20 01:50





  Labetalol Hcl 100 Mg/20 Ml Vial  SLOW IVP   2 ml





  Q4H PRN   Administration





  SBP Greater Than 180  


 


Levothyroxine Sodium  25 mcg  10/17/20 06:00  10/28/20 05:00





  Levothyroxine Sodium 25 Mcg Tab  PO   25 mcg





  0600 SAIDA   Administration


 


Lorazepam  2 mg  10/23/20 09:45  10/28/20 11:10





  Lorazepam 2 Mg/Ml Vial  SLOW IVP  11/22/20 09:45  2 mg





  Q1H PRN   Administration





  Breakthrough agitation  


 


Methylprednisolone Sodium Succinate  40 mg  10/17/20 21:00  10/28/20 07:30





  Methylprednisolone Sod Succ 40 Mg Vial  IVP   40 mg





  Q12HR SAIDA   Administration


 


Metoprolol Tartrate  50 mg  10/13/20 09:00  10/28/20 07:28





  Metoprolol Tartrate 50 Mg Tab  PO   50 mg





  BID SAIDA   Administration


 


Pantoprazole Sodium  40 mg  10/27/20 09:00  10/28/20 07:30





  Pantoprazole 40 Mg Vial  IVP   40 mg





  DAILY SAIDA   Administration


 


Polyvinyl Alcohol/Povidone  1 each  10/13/20 09:00  10/28/20 07:33





  Polyvinyl Alcohol 1.4%/Povidone 0.6% Opth Drops  EA EYE   1 each





  DAILY SAIDA   Administration


 


Propofol  1,000 mg  10/23/20 09:45  10/28/20 13:04





  Propofol 1,000 Mg/100 Ml Vial  IV  11/22/20 09:45  1,000 mg





  INF PRN   Administration





  TO ACHIEVE GOAL RASS  





  Protocol  


 


Propylene Glycol  1 drop  10/13/20 09:00  10/28/20 07:32





  Polyethylene Glycol Opth Drop 15 Ml Bot  EA EYE   1 each





  BID SAIDA   Administration


 


Scopolamine  1.5 mg  10/25/20 11:00  10/28/20 11:10





  Scopolamine 1.5 Mg/72 Hour Patch  TD   1.5 mg





  Q3D SAIDA   Administration


 


Simvastatin  10 mg  10/19/20 21:00  10/27/20 20:58





  Simvastatin 10 Mg Tab  PO   10 mg





  2100 SAIDA   Administration


 


Sodium Chloride  10 ml  10/13/20 09:00  10/28/20 07:33





  Flush - Normal Saline 10 Ml Syringe  IVF   10 ml





  Q12HR SAIDA   Administration


 


Sterile Water  1 ml  10/17/20 11:00  10/28/20 07:29





  Bacteriostatic Water 30 Ml Vial  FS   1 ml





  PRN PRN   Administration





  RECONSTITUTION  


 


Throat Lozenges  1 gloria  10/18/20 23:28  10/19/20 00:13





  Cepastat Lozenges 1 Gloria  PO   1 gloria





  Q2H PRN   Administration





  Sore Throat  


 


Vecuronium Bromide  10 mg  10/23/20 11:13  10/28/20 11:10





  Vecuronium 10 Mg Vial  IV   10 mg





  Q1H PRN   Administration





   SEDATION  


 


Zinc Sulfate  220 mg  10/16/20 09:00  10/28/20 07:28





  Zinc Sulfate 220 Mg Cap  PO   220 mg





  DAILY SAIDA   Administration














- Exam


General - other findings: sedate on mech ventilation


Eye: PERRL, anicteric sclera


ENT: normocephalic atraumatic, no oropharyngeal lesions


Neck: supple, symmetric, no JVD, no thyromegaly, no lymphadenopathy


Heart: RRR, no gallops, no rubs, normal peripheral pulses


Heart - other findings: S1, S2


Respiratory - other findings: coarse sounds bilat, diminished in bases


Gastrointestinal: soft, non-tender, non-distended, normal bowel sounds, no palpa

ble masses


Extremities: no cyanosis, no clubbing, no edema


Skin: normal turgor


Musculoskeletal: generalized weakness


Psychiatric: somnolent, lethargic





Hosp A/P


(1) Acute respiratory failure with hypoxia


Code(s): J96.01 - ACUTE RESPIRATORY FAILURE WITH HYPOXIA   Status: Acute   


Plan: 


Continue mech ventilation with BiLEVEL, not weanable currently, ? trach








(2) Pneumonia due to COVID-19 virus


Code(s): U07.1 - COVID-19; J12.89 - OTHER VIRAL PNEUMONIA   Status: Acute   


Plan: 


s/p convalescent plasma, Vit C/Zinc/Solumedrol








(3) Acute on chronic kidney failure


Code(s): N17.9 - ACUTE KIDNEY FAILURE, UNSPECIFIED; N18.9 - CHRONIC KIDNEY 

DISEASE, UNSPECIFIED   Status: Acute   


Plan: 


Continue to avoid nephrotoxic meds and limit contrast, Sodium bicarbonate gtt








(4) Hyperkalemia


Code(s): E87.5 - HYPERKALEMIA   Status: Acute   


Plan: 


Sodium bicarbonate gtt, serial K+ monitoring








(5) Metabolic acidosis


Code(s): E87.2 - ACIDOSIS   Status: Acute   


Plan: 


See above








(6) UTI (urinary tract infection)


Status: Acute   


Plan: 


s/p IV abx, completed course of therapy








- Plan


, respiratory therapy, DVT proph w/SCDs





Continue critical support


Mech ventilation with Bilevel


Nutritional support with TF's


? candidate for trach given prolonged hospital stay on vent


AM lab: BMP, CBC, CRP, Ferritin, D-dimer
- Subjective


Encounter Date: 10/29/20


Encounter Time: 19:00


Subjective: 


f/u for resp failure due COVID PNA on Bi-level mech ventilation. Unable to wean 

and received HD today. Receiving Fondaparinux/Solumedrol/Zinc/Vit C. Nursing 

report pt received Kayexalate and had 3 BM's.





- Objective


Vital Signs & Weight: 


                             Vital Signs (12 hours)











  Temp Pulse Resp BP


 


 10/29/20 20:00  97.7 F   


 


 10/29/20 19:15    37 H 


 


 10/29/20 18:35   60  


 


 10/29/20 18:00    35 H 


 


 10/29/20 16:41   74   98/48 L


 


 10/29/20 16:00  97.8 F   26 H 


 


 10/29/20 14:19   74   107/60


 


 10/29/20 14:00    26 H 


 


 10/29/20 12:00  98.5 F   26 H 


 


 10/29/20 10:24   65   104/49 L


 


 10/29/20 10:00    26 H 


 


 10/29/20 09:39   73   110/46 L


 


 10/29/20 09:38   73   110/46 L








                                     Weight











Admit Weight                   176 lb 8 oz


 


Weight                         204 lb 12.951 oz











                            Most Recent Monitor Data











Heart Rate from ECG            61


 


NIBP                           109/53


 


NIBP BP-Mean                   71


 


Respiration from ECG           26


 


SpO2                           94














I&O: 


                                        











 10/28/20 10/29/20 10/30/20





 06:59 06:59 06:59


 


Intake Total 2392.1 4459.6 2243


 


Output Total 1125 1330 710


 


Balance 1267.1 3129.6 1533











Result Diagrams: 


                                 10/29/20 03:23





                                 10/29/20 03:23


Additional Labs: 


Microbiology





10/14/20 09:45   Urine voided   Urine Culture - Final


                              Klebsiella pneumoniae ssp pneu


10/12/20 11:42   Venous blood - Right Arm   Blood Culture - Final


                              NO GROWTH IN 5 DAYS


10/12/20 11:34   Venous blood - Left Arm   Blood Culture - Final


                              NO GROWTH IN 5 DAYS





                                Laboratory Tests











  10/12/20 10/23/20 10/23/20





  14:59 03:29 03:29


 


D-Dimer    18.59 H


 


Potassium   


 


Creatinine   


 


Ferritin   633.47 H 


 


C-Reactive Protein   


 


SARS-CoV-2 (PCR)  DETECTED A*  














  10/23/20 10/23/20 10/24/20





  10:46 10:46 03:12


 


D-Dimer   


 


Potassium    5.8 H


 


Creatinine  1.58 H   1.87 H


 


Ferritin   


 


C-Reactive Protein   7.82 H 


 


SARS-CoV-2 (PCR)   














  10/24/20 10/24/20 10/24/20





  03:12 03:12 03:12


 


D-Dimer   15.59 H 


 


Potassium   


 


Creatinine   


 


Ferritin    852.02 H


 


C-Reactive Protein  9.65 H  


 


SARS-CoV-2 (PCR)   














  10/24/20 10/25/20 10/25/20





  17:45 03:33 03:33


 


D-Dimer    11.25 H


 


Potassium  5.5 H  5.5 H 


 


Creatinine   1.65 H 


 


Ferritin   


 


C-Reactive Protein   6.51 H 


 


SARS-CoV-2 (PCR)   














  10/25/20 10/26/20 10/26/20





  03:33 03:09 03:09


 


D-Dimer    11.35 H


 


Potassium   5.5 H 


 


Creatinine   1.78 H 


 


Ferritin  496.75 H  


 


C-Reactive Protein   3.80 H 


 


SARS-CoV-2 (PCR)   














  10/26/20 10/27/20 10/27/20





  03:09 11:58 11:58


 


D-Dimer   


 


Potassium   6.5 H 


 


Creatinine   1.77 H 


 


Ferritin  445.72 H   404.41 H


 


C-Reactive Protein   1.59 H 


 


SARS-CoV-2 (PCR)   














  10/27/20 10/28/20 10/28/20





  19:31 05:17 05:17


 


D-Dimer  4.90 H   3.67 H


 


Potassium   


 


Creatinine   


 


Ferritin   


 


C-Reactive Protein   1.01 H 


 


SARS-CoV-2 (PCR)   














  10/28/20





  05:17


 


D-Dimer 


 


Potassium 


 


Creatinine 


 


Ferritin  425.67 H


 


C-Reactive Protein 


 


SARS-CoV-2 (PCR) 











EKG Reviewed by me: Yes (Tele - SR)





Hospitalist ROS





- Medication


Medications: 


Active Medications











Generic Name Dose Route Start Last Admin





  Trade Name Freq  PRN Reason Stop Dose Admin


 


Acetaminophen  650 mg  10/12/20 14:11  10/17/20 22:18





  Acetaminophen 325 Mg Tab  PO   650 mg





  Q4H PRN   Administration





  Headache/Fever/Mild Pain (1-3)  


 


Amlodipine Besylate  5 mg  10/13/20 09:00  10/29/20 09:39





  Amlodipine 5 Mg Tab  PO   5 mg





  BID SAIDA   Administration


 


Ascorbic Acid  1,000 mg  10/15/20 21:00  10/29/20 09:39





  Ascorbic Acid 500 Mg Chewable Tablet  PO   1,000 mg





  BID SAIDA   Administration


 


Brimonidine Tartrate  1 drop  10/13/20 09:00  10/29/20 09:14





  Brimonidine Tartrate 0.2% Ophth Soln 5 Ml Bottle  R EYE   1 drop





  BID SAIDA   Administration


 


Calcitriol  0.25 mcg  10/13/20 09:00  10/29/20 09:40





  Calcitriol 0.25 Mcg Cap  PO   0.25 mcg





  DAILY SAIDA   Administration


 


Cholecalciferol  1,000 units  10/13/20 09:00  10/29/20 09:38





  Cholecalciferol 1,000 Units (25 Mcg) Tab  PO   1,000 units





  DAILY SAIDA   Administration


 


Dextrose/Water  25 gm  10/12/20 14:15  10/25/20 11:47





  Dextrose 50% Abboject 50 Ml Syringe  SLOW IVP   25 gm





  PRN PRN   Administration





  Hypoglycemia  


 


Diphenhydramine HCl  25 mg  10/14/20 04:24  10/20/20 20:11





  Diphenhydramine 25 Mg Cap  PO   25 mg





  HSPRN PRN   Administration





  Insomnia  


 


Dorzolamide/Timolol  1 drop  10/13/20 09:00  10/29/20 16:42





  Dorzolamide/Timolol 2%/0.5% Ophth Soln 10 Ml Bottle  EA EYE   1 each





  TID SAIDA   Administration


 


Fondaparinux  5 mg  10/26/20 06:00  10/29/20 06:26





  Fondaparinux Sodium 10 Mg/0.8 Ml Syringe  SC   5 mg





  DAILY@0600 SAIDA   Administration


 


Guaifenesin  600 mg  10/13/20 21:00  10/29/20 09:40





  Guaifenesin Er 600 Mg Tab  PO   600 mg





  Q12HR SAIDA   Administration


 


Hydralazine HCl  50 mg  10/13/20 09:00  10/29/20 16:41





  Hydralazine 25 Mg Tab  PO   Not Given





  TID SAIDA  


 


Fentanyl Citrate 2,000 mcg/  100 mls @ 0 mls/hr  10/23/20 09:45  10/29/20 04:54





  Sodium Chloride  IV  11/22/20 09:45  100 mls





  INF SAIDA   Administration





  Protocol  





  Per Protocol  


 


Insulin Glargine 35 units/  0.35 mls @ 0 mls/hr  10/27/20 21:00  10/29/20 09:38





  Miscellaneous Medication  SC   0.35 mls





  BID SAIDA   Administration


 


Sodium Bicarbonate 150 meq/  1,150 mls @ 125 mls/hr  10/28/20 12:17  10/29/20 

16:34





  Dextrose/Water  IV   1,150 mls





  .Q9H12M SAIDA   Administration


 


Insulin Human Lispro  0 units  10/12/20 21:21  10/17/20 22:19





  Humalog 300 Units/3 Ml Vial  SC   2 unit





  .BEDTIME SLIDING SC PRN   Administration





  Bedtime Correctional Scale  


 


Insulin Human Lispro  0 units  10/28/20 10:45  10/29/20 09:52





  Humalog 300 Units/3 Ml Vial  SC   2 unit





  .MODERATE SLIDING SC PRN   Administration





  MODERATE SLIDING SCALE  





  Protocol  


 


Isosorbide Dinitrate  20 mg  10/22/20 21:00  10/29/20 09:38





  Isosorbide Dinitrate 20 Mg Tab  PO   20 mg





  BID SAIDA   Administration


 


Labetalol HCl  10 mg  10/12/20 14:18  10/14/20 01:50





  Labetalol Hcl 100 Mg/20 Ml Vial  SLOW IVP   2 ml





  Q4H PRN   Administration





  SBP Greater Than 180  


 


Lactulose  30 gm  10/29/20 09:00  10/29/20 09:37





  Lactulose 20 Gm/30 Ml Udcup  PO   30 gm





  Q2D SAIDA   Administration


 


Levothyroxine Sodium  25 mcg  10/17/20 06:00  10/29/20 06:26





  Levothyroxine Sodium 25 Mcg Tab  PO   25 mcg





  0600 SAIDA   Administration


 


Lorazepam  2 mg  10/23/20 09:45  10/29/20 12:30





  Lorazepam 2 Mg/Ml Vial  SLOW IVP  11/22/20 09:45  2 mg





  Q1H PRN   Administration





  Breakthrough agitation  


 


Methylprednisolone Sodium Succinate  40 mg  10/17/20 21:00  10/29/20 09:17





  Methylprednisolone Sod Succ 40 Mg Vial  IVP   40 mg





  Q12HR SAIDA   Administration


 


Metoprolol Tartrate  50 mg  10/13/20 09:00  10/29/20 09:39





  Metoprolol Tartrate 50 Mg Tab  PO   50 mg





  BID SAIDA   Administration


 


Pantoprazole Sodium  40 mg  10/27/20 09:00  10/29/20 09:18





  Pantoprazole 40 Mg Vial  IVP   40 mg





  DAILY SAIDA   Administration


 


Polyvinyl Alcohol/Povidone  1 each  10/13/20 09:00  10/29/20 09:14





  Polyvinyl Alcohol 1.4%/Povidone 0.6% Opth Drops  EA EYE   1 each





  DAILY SAIDA   Administration


 


Propofol  1,000 mg  10/23/20 09:45  10/29/20 16:45





  Propofol 1,000 Mg/100 Ml Vial  IV  11/22/20 09:45  1,000 mg





  INF PRN   Administration





  TO ACHIEVE GOAL RASS  





  Protocol  


 


Propylene Glycol  1 drop  10/13/20 09:00  10/29/20 09:14





  Polyethylene Glycol Opth Drop 15 Ml Bot  EA EYE   1 each





  BID SAIDA   Administration


 


Scopolamine  1.5 mg  10/25/20 11:00  10/28/20 11:10





  Scopolamine 1.5 Mg/72 Hour Patch  TD   1.5 mg





  Q3D SAIDA   Administration


 


Simvastatin  10 mg  10/19/20 21:00  10/28/20 21:00





  Simvastatin 10 Mg Tab  PO   10 mg





  2100 SAIDA   Administration


 


Sodium Chloride  10 ml  10/13/20 09:00  10/29/20 09:48





  Flush - Normal Saline 10 Ml Syringe  IVF   10 ml





  Q12HR SAIDA   Administration


 


Sodium Polystyrene Sulfonate  30 gm  10/29/20 09:00  10/29/20 10:35





  Sodium Polystyrene Sulfonate 15 Gm/60 Ml Bot  PO   30 gm





  Q2D SAIDA   Administration


 


Sterile Water  1 ml  10/17/20 11:00  10/29/20 04:00





  Bacteriostatic Water 30 Ml Vial  FS   1 ml





  PRN PRN   Administration





  RECONSTITUTION  


 


Throat Lozenges  1 gloria  10/18/20 23:28  10/19/20 00:13





  Cepastat Lozenges 1 Gloria  PO   1 gloria





  Q2H PRN   Administration





  Sore Throat  


 


Vecuronium Bromide  10 mg  10/23/20 11:13  10/29/20 12:31





  Vecuronium 10 Mg Vial  IV   10 mg





  Q1H PRN   Administration





   SEDATION  


 


Zinc Sulfate  220 mg  10/16/20 09:00  10/29/20 09:40





  Zinc Sulfate 220 Mg Cap  PO   220 mg





  DAILY SAIDA   Administration














- Exam


General Appearance: ill appearing


General - other findings: sedate on mech vent


Eye: PERRL


ENT: normocephalic atraumatic, no oropharyngeal lesions


ENT - other findings: ETT in place


Neck: supple, symmetric, no JVD, no thyromegaly, no lymphadenopathy


Heart: RRR, no gallops, no rubs, normal peripheral pulses


Heart - other findings: S1, S2


Respiratory - other findings: coarse sounds bilat, diminished in bases


Gastrointestinal: soft, non-tender, non-distended, normal bowel sounds, no 

palpable masses


Gastrointestinal - other findings: obese


Extremities: no cyanosis, no clubbing, no edema


Skin: normal turgor


Neurological - other findings: sedate on mech vent


Musculoskeletal: generalized weakness


Psychiatric: somnolent, lethargic





Hosp A/P


(1) Acute respiratory failure with hypoxia


Code(s): J96.01 - ACUTE RESPIRATORY FAILURE WITH HYPOXIA   Status: Acute   


Plan: 


Continue mech ventilation with Bi-level FIO2 55%, not weanable








(2) Pneumonia due to COVID-19 virus


Code(s): U07.1 - COVID-19; J12.89 - OTHER VIRAL PNEUMONIA   Status: Acute   


Plan: 


Continue Fondaparinux/Solumedrol/Vit C/Zinc








(3) Acute on chronic kidney failure


Code(s): N17.9 - ACUTE KIDNEY FAILURE, UNSPECIFIED; N18.9 - CHRONIC KIDNEY 

DISEASE, UNSPECIFIED   Status: Acute   


Plan: 


HD per Renal service, s/p Kayexalate








(4) Hyperkalemia


Code(s): E87.5 - HYPERKALEMIA   Status: Acute   


Plan: 


Kayexalate/HD completed today, serial K+ monitoring








(5) Metabolic acidosis


Code(s): E87.2 - ACIDOSIS   Status: Acute   


Plan: 


Resolving








(6) UTI (urinary tract infection)


Status: Acute   


Plan: 


Completed tx for Klebsiella spp








- Plan


, respiratory therapy, DVT proph w/SCDs





Continue critical support


Mech ventilation with Bilevel


Nutritional support with TF's


? candidate for trach given prolonged hospital stay on vent


AM lab: BMP, CBC, CRP, Ferritin, D-dimer, ABG


PCXR in am
- Subjective


Encounter Date: 10/30/20


Encounter Time: 11:45


Subjective: 


pt intubated





- Objective


Vital Signs & Weight: 


                             Vital Signs (12 hours)











  Temp Pulse Resp BP


 


 10/31/20 12:00    26 H 


 


 10/31/20 10:33   62   142/53 H


 


 10/31/20 10:00    26 H 


 


 10/31/20 07:24   59 L  


 


 10/31/20 07:00  98.6 F   


 


 10/31/20 06:00    35 H 


 


 10/31/20 04:00  98.3 F   26 H 


 


 10/31/20 02:00    26 H 


 


 10/31/20 01:54   60   173/68 H


 


 10/31/20 01:00  98.2 F   








                                     Weight











Admit Weight                   176 lb 8 oz


 


Weight                         213 lb 10.047 oz











                            Most Recent Monitor Data











Heart Rate from ECG            63


 


NIBP                           123/54


 


NIBP BP-Mean                   77


 


Respiration from ECG           26


 


SpO2                           100














I&O: 


                                        











 10/30/20 10/31/20 11/01/20





 06:59 06:59 05:59


 


Intake Total 4059.8 3620.2 210


 


Output Total 1230 710 100


 


Balance 2829.8 2910.2 110











Result Diagrams: 


                                 10/31/20 04:27





                                 10/31/20 04:27


Additional Labs: 


                                   Accuchecks











  10/31/20 10/30/20 10/30/20





  11:29 22:14 18:13


 


POC Glucose  110 H  165 H  120 H














  10/30/20 10/30/20 10/29/20





  17:06 10:35 21:33


 


POC Glucose  69 L  146 H  241 H














  10/29/20 10/29/20 10/28/20





  16:36 09:49 20:56


 


POC Glucose  135 H  185 H  255 H














Hospitalist ROS





- Review of Systems


Other: 





intubated





- Medication


Medications: 


Active Medications











Generic Name Dose Route Start Last Admin





  Trade Name Freq  PRN Reason Stop Dose Admin


 


Acetaminophen  650 mg  10/12/20 14:11  10/17/20 22:18





  Acetaminophen 325 Mg Tab  PO   650 mg





  Q4H PRN   Administration





  Headache/Fever/Mild Pain (1-3)  


 


Albumin Human  25 gm  10/31/20 11:00  10/31/20 11:12





  Albumin 25% 25 Gm/100 Ml Bot  IVPB  10/31/20 13:00  25 gm





  NOW SAIDA   Administration


 


Amlodipine Besylate  5 mg  10/13/20 09:00  10/30/20 20:55





  Amlodipine 5 Mg Tab  PO   5 mg





  BID SAIDA   Administration


 


Ascorbic Acid  1,000 mg  10/15/20 21:00  10/31/20 09:23





  Ascorbic Acid 500 Mg Chewable Tablet  PO   1,000 mg





  BID SAIDA   Administration


 


Brimonidine Tartrate  1 drop  10/13/20 09:00  10/31/20 09:26





  Brimonidine Tartrate 0.2% Ophth Soln 5 Ml Bottle  R EYE   1 drop





  BID SAIDA   Administration


 


Calcitriol  0.25 mcg  10/13/20 09:00  10/31/20 09:23





  Calcitriol 0.25 Mcg Cap  PO   0.25 mcg





  DAILY SAIDA   Administration


 


Cholecalciferol  1,000 units  10/13/20 09:00  10/31/20 11:13





  Cholecalciferol 1,000 Units (25 Mcg) Tab  PO   1,000 units





  DAILY SAIDA   Administration


 


Dextrose/Water  25 gm  10/12/20 14:15  10/25/20 11:47





  Dextrose 50% Abboject 50 Ml Syringe  SLOW IVP   25 gm





  PRN PRN   Administration





  Hypoglycemia  


 


Diphenhydramine HCl  25 mg  10/14/20 04:24  10/20/20 20:11





  Diphenhydramine 25 Mg Cap  PO   25 mg





  HSPRN PRN   Administration





  Insomnia  


 


Dorzolamide/Timolol  1 drop  10/13/20 09:00  10/31/20 09:46





  Dorzolamide/Timolol 2%/0.5% Ophth Soln 10 Ml Bottle  EA EYE   1 each





  TID SAIDA   Administration


 


Fondaparinux  5 mg  10/26/20 06:00  10/31/20 05:38





  Fondaparinux Sodium 10 Mg/0.8 Ml Syringe  SC   5 mg





  DAILY@0600 SAIDA   Administration


 


Guaifenesin  600 mg  10/13/20 21:00  10/31/20 09:25





  Guaifenesin Er 600 Mg Tab  PO   600 mg





  Q12HR SAIDA   Administration


 


Hydralazine HCl  50 mg  10/13/20 09:00  10/30/20 20:49





  Hydralazine 25 Mg Tab  PO   50 mg





  TID SAIDA   Administration


 


Dextrose/Water  1,000 mls @ 0 mls/hr  10/12/20 14:15  10/31/20 04:59





  D5w  IV   1,000 mls





  .Q0M PRN   Administration





  Hypoglycemia  





  As Directed  


 


Fentanyl Citrate 2,000 mcg/  100 mls @ 0 mls/hr  10/23/20 09:45  10/31/20 05:20





  Sodium Chloride  IV  11/22/20 09:45  100 mls





  INF SAIDA   Administration





  Protocol  





  Per Protocol  


 


Insulin Glargine 35 units/  0.35 mls @ 0 mls/hr  10/27/20 21:00  10/31/20 09:25





  Miscellaneous Medication  SC   0.35 mls





  BID SAIDA   Administration


 


Insulin Human Lispro  0 units  10/12/20 21:21  10/17/20 22:19





  Humalog 300 Units/3 Ml Vial  SC   2 unit





  .BEDTIME SLIDING SC PRN   Administration





  Bedtime Correctional Scale  


 


Insulin Human Lispro  0 units  10/28/20 10:45  10/30/20 06:23





  Humalog 300 Units/3 Ml Vial  SC   6 unit





  .MODERATE SLIDING SC PRN   Administration





  MODERATE SLIDING SCALE  





  Protocol  


 


Isosorbide Dinitrate  20 mg  10/22/20 21:00  10/30/20 20:55





  Isosorbide Dinitrate 20 Mg Tab  PO   20 mg





  BID SAIDA   Administration


 


Labetalol HCl  10 mg  10/12/20 14:18  10/14/20 01:50





  Labetalol Hcl 100 Mg/20 Ml Vial  SLOW IVP   2 ml





  Q4H PRN   Administration





  SBP Greater Than 180  


 


Lactulose  30 gm  10/29/20 09:00  10/31/20 09:32





  Lactulose 20 Gm/30 Ml Udcup  PO   30 gm





  Q2D SAIDA   Administration


 


Levothyroxine Sodium  25 mcg  10/17/20 06:00  10/31/20 05:38





  Levothyroxine Sodium 25 Mcg Tab  PO   25 mcg





  0600 SAIDA   Administration


 


Lorazepam  2 mg  10/23/20 09:45  10/30/20 22:07





  Lorazepam 2 Mg/Ml Vial  SLOW IVP  11/22/20 09:45  2 mg





  Q1H PRN   Administration





  Breakthrough agitation  


 


Methylprednisolone Sodium Succinate  40 mg  10/17/20 21:00  10/31/20 09:23





  Methylprednisolone Sod Succ 40 Mg Vial  IVP   40 mg





  Q12HR SAIDA   Administration


 


Metoprolol Tartrate  50 mg  10/13/20 09:00  10/30/20 20:49





  Metoprolol Tartrate 50 Mg Tab  PO   50 mg





  BID SAIDA   Administration


 


Pantoprazole Sodium  40 mg  10/27/20 09:00  10/31/20 09:24





  Pantoprazole 40 Mg Vial  IVP   40 mg





  DAILY SAIDA   Administration


 


Polyvinyl Alcohol/Povidone  1 each  10/13/20 09:00  10/31/20 09:29





  Polyvinyl Alcohol 1.4%/Povidone 0.6% Opth Drops  EA EYE   1 each





  DAILY SAIDA   Administration


 


Propofol  1,000 mg  10/23/20 09:45  10/31/20 11:24





  Propofol 1,000 Mg/100 Ml Vial  IV  11/22/20 09:45  1,000 mg





  INF PRN   Administration





  TO ACHIEVE GOAL RASS  





  Protocol  


 


Propylene Glycol  1 drop  10/13/20 09:00  10/31/20 09:32





  Polyethylene Glycol Opth Drop 15 Ml Bot  EA EYE   1 each





  BID SAIDA   Administration


 


Scopolamine  1.5 mg  10/25/20 11:00  10/31/20 11:16





  Scopolamine 1.5 Mg/72 Hour Patch  TD   1.5 mg





  Q3D SAIDA   Administration


 


Simvastatin  10 mg  10/19/20 21:00  10/30/20 20:49





  Simvastatin 10 Mg Tab  PO   10 mg





  2100 SAIDA   Administration


 


Sodium Chloride  10 ml  10/13/20 09:00  10/31/20 09:25





  Flush - Normal Saline 10 Ml Syringe  IVF   10 ml





  Q12HR SAIDA   Administration


 


Sodium Polystyrene Sulfonate  30 gm  10/29/20 09:00  10/31/20 09:32





  Sodium Polystyrene Sulfonate 15 Gm/60 Ml Bot  PO   30 gm





  Q2D SAIDA   Administration


 


Sterile Water  1 ml  10/17/20 11:00  10/29/20 23:51





  Bacteriostatic Water 30 Ml Vial  FS   1 ml





  PRN PRN   Administration





  RECONSTITUTION  


 


Throat Lozenges  1 gloria  10/18/20 23:28  10/19/20 00:13





  Cepastat Lozenges 1 Gloria  PO   1 gloria





  Q2H PRN   Administration





  Sore Throat  


 


Vecuronium Bromide  10 mg  10/23/20 11:13  10/30/20 22:31





  Vecuronium 10 Mg Vial  IV   10 mg





  Q1H PRN   Administration





   SEDATION  


 


Zinc Sulfate  220 mg  10/16/20 09:00  10/31/20 09:23





  Zinc Sulfate 220 Mg Cap  PO   220 mg





  DAILY SAIDA   Administration














- Exam


Heart: negative: RRR, no murmur, no gallops, no rubs, normal peripheral pulses, 

irregular, diminshed peripheral pulses, murmur present, II/IV, III/IV


Respiratory: negative: CTAB, no wheezes, no rales, no ronchi, normal chest 

expansion, no tachypnea, normal percussion, rales, rhonchi, tachypneic, wheezes


Gastrointestinal: negative: soft, non-tender, non-distended, normal bowel 

sounds, no palpable masses, no hepatomegaly, no splenomegaly, no bruit, no 

guarding, no rigidity, tender to palpation, distended, diminished bowl sounds, 

voluntary guarding


Extremities: 1+ LE edema





Hosp A/P


(1) Acute respiratory failure with hypoxia


Code(s): J96.01 - ACUTE RESPIRATORY FAILURE WITH HYPOXIA   Status: Acute   





(2) Pneumonia due to COVID-19 virus


Code(s): U07.1 - COVID-19; J12.89 - OTHER VIRAL PNEUMONIA   Status: Acute   





(3) Hyperlipidemia


Code(s): E78.5 - HYPERLIPIDEMIA, UNSPECIFIED   Status: Chronic   





(4) Hypertension


Code(s): I10 - ESSENTIAL (PRIMARY) HYPERTENSION   Status: Chronic   





(5) Acute on chronic kidney failure


Code(s): N17.9 - ACUTE KIDNEY FAILURE, UNSPECIFIED; N18.9 - CHRONIC KIDNEY 

DISEASE, UNSPECIFIED   Status: Acute   





- Plan


Continue critical care support.  Steroids per pulmonology.  Patient on DVT 

prophylaxis.  We will continue to follow labs.  She is off precautions.  Patient

was treated for UTI.  RAM[place] improving.
- Subjective


Encounter Date: 10/31/20


Encounter Time: 10:00


Subjective: 


is on vent, sedated


is getting hemodialysis





- Objective


Vital Signs & Weight: 


                             Vital Signs (12 hours)











  Temp Pulse Resp BP Pulse Ox


 


 10/31/20 14:29   52 L   


 


 10/31/20 14:00    26 H  


 


 10/31/20 12:00  98.4 F   26 H  


 


 10/31/20 10:33   62   142/53 H 


 


 10/31/20 10:00    26 H  


 


 10/31/20 07:50      97


 


 10/31/20 07:24   59 L   


 


 10/31/20 07:00  98.6 F    


 


 10/31/20 06:00    35 H  


 


 10/31/20 04:00  98.3 F   26 H  








                                     Weight











Admit Weight                   176 lb 8 oz


 


Weight                         213 lb 10.047 oz











                            Most Recent Monitor Data











Heart Rate from ECG            56


 


NIBP                           117/49


 


NIBP BP-Mean                   71


 


Respiration from ECG           26


 


SpO2                           99














I&O: 


                                        











 10/30/20 10/31/20 11/01/20





 06:59 06:59 05:59


 


Intake Total 4059.8 3620.2 395


 


Output Total 1230 710 133


 


Balance 2829.8 2910.2 262











Result Diagrams: 


                                 10/31/20 04:27





                                 10/31/20 04:27


Additional Labs: 


                                   Accuchecks











  10/31/20 10/30/20 10/30/20





  11:29 22:14 18:13


 


POC Glucose  110 H  165 H  120 H














  10/30/20 10/30/20 10/29/20





  17:06 10:35 21:33


 


POC Glucose  69 L  146 H  241 H














  10/29/20 10/29/20 10/28/20





  16:36 09:49 20:56


 


POC Glucose  135 H  185 H  255 H














Hospitalist ROS





- Medication


Medications: 


Active Medications











Generic Name Dose Route Start Last Admin





  Trade Name Freq  PRN Reason Stop Dose Admin


 


Acetaminophen  650 mg  10/12/20 14:11  10/17/20 22:18





  Acetaminophen 325 Mg Tab  PO   650 mg





  Q4H PRN   Administration





  Headache/Fever/Mild Pain (1-3)  


 


Amlodipine Besylate  5 mg  10/13/20 09:00  10/31/20 09:30





  Amlodipine 5 Mg Tab  PO   Not Given





  BID SAIDA  


 


Ascorbic Acid  1,000 mg  10/15/20 21:00  10/31/20 09:23





  Ascorbic Acid 500 Mg Chewable Tablet  PO   1,000 mg





  BID SAIDA   Administration


 


Brimonidine Tartrate  1 drop  10/13/20 09:00  10/31/20 09:26





  Brimonidine Tartrate 0.2% Ophth Soln 5 Ml Bottle  R EYE   1 drop





  BID SAIDA   Administration


 


Calcitriol  0.25 mcg  10/13/20 09:00  10/31/20 09:23





  Calcitriol 0.25 Mcg Cap  PO   0.25 mcg





  DAILY SAIDA   Administration


 


Cholecalciferol  1,000 units  10/13/20 09:00  10/31/20 11:13





  Cholecalciferol 1,000 Units (25 Mcg) Tab  PO   1,000 units





  DAILY SAIDA   Administration


 


Dextrose/Water  25 gm  10/12/20 14:15  10/25/20 11:47





  Dextrose 50% Abboject 50 Ml Syringe  SLOW IVP   25 gm





  PRN PRN   Administration





  Hypoglycemia  


 


Diphenhydramine HCl  25 mg  10/14/20 04:24  10/20/20 20:11





  Diphenhydramine 25 Mg Cap  PO   25 mg





  HSPRN PRN   Administration





  Insomnia  


 


Dorzolamide/Timolol  1 drop  10/13/20 09:00  10/31/20 09:46





  Dorzolamide/Timolol 2%/0.5% Ophth Soln 10 Ml Bottle  EA EYE   1 each





  TID SAIDA   Administration


 


Fondaparinux  5 mg  10/26/20 06:00  10/31/20 05:38





  Fondaparinux Sodium 10 Mg/0.8 Ml Syringe  SC   5 mg





  DAILY@0600 SAIDA   Administration


 


Guaifenesin  600 mg  10/13/20 21:00  10/31/20 09:25





  Guaifenesin Er 600 Mg Tab  PO   600 mg





  Q12HR SAIDA   Administration


 


Hydralazine HCl  50 mg  10/13/20 09:00  10/31/20 09:30





  Hydralazine 25 Mg Tab  PO   Not Given





  TID SAIDA  


 


Dextrose/Water  1,000 mls @ 0 mls/hr  10/12/20 14:15  10/31/20 04:59





  D5w  IV   1,000 mls





  .Q0M PRN   Administration





  Hypoglycemia  





  As Directed  


 


Fentanyl Citrate 2,000 mcg/  100 mls @ 0 mls/hr  10/23/20 09:45  10/31/20 05:20





  Sodium Chloride  IV  11/22/20 09:45  100 mls





  INF SAIDA   Administration





  Protocol  





  Per Protocol  


 


Insulin Glargine 35 units/  0.35 mls @ 0 mls/hr  10/27/20 21:00  10/31/20 09:25





  Miscellaneous Medication  SC   0.35 mls





  BID SAIDA   Administration


 


Insulin Human Lispro  0 units  10/12/20 21:21  10/17/20 22:19





  Humalog 300 Units/3 Ml Vial  SC   2 unit





  .BEDTIME SLIDING SC PRN   Administration





  Bedtime Correctional Scale  


 


Insulin Human Lispro  0 units  10/28/20 10:45  10/30/20 06:23





  Humalog 300 Units/3 Ml Vial  SC   6 unit





  .MODERATE SLIDING SC PRN   Administration





  MODERATE SLIDING SCALE  





  Protocol  


 


Isosorbide Dinitrate  20 mg  10/22/20 21:00  10/31/20 09:30





  Isosorbide Dinitrate 20 Mg Tab  PO   Not Given





  BID SAIDA  


 


Labetalol HCl  10 mg  10/12/20 14:18  10/14/20 01:50





  Labetalol Hcl 100 Mg/20 Ml Vial  SLOW IVP   2 ml





  Q4H PRN   Administration





  SBP Greater Than 180  


 


Lactulose  30 gm  10/29/20 09:00  10/31/20 09:32





  Lactulose 20 Gm/30 Ml Udcup  PO   30 gm





  Q2D SAIDA   Administration


 


Levothyroxine Sodium  25 mcg  10/17/20 06:00  10/31/20 05:38





  Levothyroxine Sodium 25 Mcg Tab  PO   25 mcg





  0600 SAIDA   Administration


 


Lorazepam  2 mg  10/23/20 09:45  10/31/20 12:27





  Lorazepam 2 Mg/Ml Vial  SLOW IVP  11/22/20 09:45  2 mg





  Q1H PRN   Administration





  Breakthrough agitation  


 


Methylprednisolone Sodium Succinate  40 mg  10/17/20 21:00  10/31/20 09:23





  Methylprednisolone Sod Succ 40 Mg Vial  IVP   40 mg





  Q12HR SAIDA   Administration


 


Metoprolol Tartrate  50 mg  10/13/20 09:00  10/31/20 09:00





  Metoprolol Tartrate 50 Mg Tab  PO   Not Given





  BID SAIDA  


 


Pantoprazole Sodium  40 mg  10/27/20 09:00  10/31/20 09:24





  Pantoprazole 40 Mg Vial  IVP   40 mg





  DAILY SAIDA   Administration


 


Polyvinyl Alcohol/Povidone  1 each  10/13/20 09:00  10/31/20 09:29





  Polyvinyl Alcohol 1.4%/Povidone 0.6% Opth Drops  EA EYE   1 each





  DAILY SAIDA   Administration


 


Propofol  1,000 mg  10/23/20 09:45  10/31/20 11:24





  Propofol 1,000 Mg/100 Ml Vial  IV  11/22/20 09:45  1,000 mg





  INF PRN   Administration





  TO ACHIEVE GOAL RASS  





  Protocol  


 


Propylene Glycol  1 drop  10/13/20 09:00  10/31/20 09:32





  Polyethylene Glycol Opth Drop 15 Ml Bot  EA EYE   1 each





  BID SAIDA   Administration


 


Scopolamine  1.5 mg  10/25/20 11:00  10/31/20 11:16





  Scopolamine 1.5 Mg/72 Hour Patch  TD   1.5 mg





  Q3D SAIDA   Administration


 


Simvastatin  10 mg  10/19/20 21:00  10/30/20 20:49





  Simvastatin 10 Mg Tab  PO   10 mg





  2100 SAIDA   Administration


 


Sodium Chloride  10 ml  10/13/20 09:00  10/31/20 09:25





  Flush - Normal Saline 10 Ml Syringe  IVF   10 ml





  Q12HR SAIDA   Administration


 


Sodium Polystyrene Sulfonate  30 gm  10/29/20 09:00  10/31/20 09:32





  Sodium Polystyrene Sulfonate 15 Gm/60 Ml Bot  PO   30 gm





  Q2D SAIDA   Administration


 


Sterile Water  1 ml  10/17/20 11:00  10/29/20 23:51





  Bacteriostatic Water 30 Ml Vial  FS   1 ml





  PRN PRN   Administration





  RECONSTITUTION  


 


Throat Lozenges  1 gloria  10/18/20 23:28  10/19/20 00:13





  Cepastat Lozenges 1 Gloria  PO   1 gloria





  Q2H PRN   Administration





  Sore Throat  


 


Vecuronium Bromide  10 mg  10/23/20 11:13  10/30/20 22:31





  Vecuronium 10 Mg Vial  IV   10 mg





  Q1H PRN   Administration





   SEDATION  


 


Zinc Sulfate  220 mg  10/16/20 09:00  10/31/20 09:23





  Zinc Sulfate 220 Mg Cap  PO   220 mg





  DAILY SAIDA   Administration














- Exam


General Appearance: ill appearing


Eye: PERRL, anicteric sclera


ENT: no oropharyngeal lesions, dry oral mucosa


Neck: supple, no JVD


Heart: RRR, no murmur


Respiratory: no wheezes, rales, rhonchi


Gastrointestinal: soft, non-tender, non-distended, normal bowel sounds


Extremities: no cyanosis, 1+ LE edema


Neurological: cranial nerve grossly intact, no focal deficits





Hosp A/P


(1) Acute respiratory failure with hypoxia


Code(s): J96.01 - ACUTE RESPIRATORY FAILURE WITH HYPOXIA   Status: Acute   





(2) Metabolic acidosis


Code(s): E87.2 - ACIDOSIS   Status: Resolved   





(3) Pneumonia due to COVID-19 virus


Code(s): U07.1 - COVID-19; J12.89 - OTHER VIRAL PNEUMONIA   Status: Acute   





(4) Acute worsening of stage 3 chronic kidney disease


Code(s): N18.3 - CHRONIC KIDNEY DISEASE, STAGE 3 (MODERATE) * DO NOT USE *   

Status: Acute   





(5) Anemia, normocytic normochromic


Code(s): D64.9 - ANEMIA, UNSPECIFIED   Status: Chronic   





(6) Chronic diastolic heart failure


Code(s): I50.32 - CHRONIC DIASTOLIC (CONGESTIVE) HEART FAILURE   Status: Chronic

  





(7) Diabetic nephropathy


Status: Chronic   


Qualifiers: 


   Diabetes mellitus type: type 2   Qualified Code(s): E11.21 - Type 2 diabetes 

mellitus with diabetic nephropathy   





(8) Hyperlipidemia


Code(s): E78.5 - HYPERLIPIDEMIA, UNSPECIFIED   Status: Chronic   


Qualifiers: 


   Hyperlipidemia type: unspecified   Qualified Code(s): E78.5 - Hyperlipidemia,

unspecified   





(9) Hypertension


Code(s): I10 - ESSENTIAL (PRIMARY) HYPERTENSION   Status: Chronic   


Qualifiers: 


   Hypertension type: essential hypertension   Qualified Code(s): I10 - 

Essential (primary) hypertension   





(10) Moderate aortic regurgitation


Code(s): I35.1 - NONRHEUMATIC AORTIC (VALVE) INSUFFICIENCY   Status: Chronic   





(11) Thrombocytopenia


Code(s): D69.6 - THROMBOCYTOPENIA, UNSPECIFIED   Status: Acute   





(12) DM type 2 (diabetes mellitus, type 2)


Status: Chronic   


Qualifiers: 


   Diabetes mellitus long term insulin use: with long term use   Diabetes 

mellitus complication status: with kidney complications   Diabetes mellitus 

complication detail: with chronic kidney disease   Chronic kidney disease stage:

stage 3 (moderate) 





- Plan





recieved convalescent plasma on 10/13, currently on solumedrol 40mg q12h


weaning  per pulm adv, is still requiring high fio2 and peep, day #8 on vent


recieved 2 u prbc on 10/30, h/h stable


was initiated on HD on 10/29, has had back to back HD with volume removal with 

levophed support during HD


continue lantus, norvasc, isordil, lopressor, synthorid, zocor, fondaparinux 

(dvt prophylaxis)


prognosis guarded


will likely need a central line, has gained around 37lbs since admission with 

edema now.
- Subjective


Encounter Date: 11/01/20


Encounter Time: 08:30


Subjective: 


on vent, will be getting HD


off sedation was responding minimally to verbal stimuli per staff





- Objective


Vital Signs & Weight: 


                             Vital Signs (12 hours)











  Temp Pulse Pulse Pulse Resp BP BP


 


 11/01/20 12:00      26 H  


 


 11/01/20 11:00  98.4 F      


 


 11/01/20 10:00      26 H  


 


 11/01/20 09:51   58 L     


 


 11/01/20 08:35    58 L  60    193/76 H


 


 11/01/20 08:08   69     235/85 H 


 


 11/01/20 08:00      34 H  


 


 11/01/20 07:21   52 L     


 


 11/01/20 07:20       


 


 11/01/20 07:00  97.9 F      


 


 11/01/20 06:00      26 H  


 


 11/01/20 04:18   52 L     136/53 L 


 


 11/01/20 04:00  97.7 F     35 H  


 


 11/01/20 02:00      35 H  














  BP Pulse Ox Pulse Ox Pulse Ox


 


 11/01/20 12:00    


 


 11/01/20 11:00    


 


 11/01/20 10:00    


 


 11/01/20 09:51    


 


 11/01/20 08:35  213/69 H   93 L  94 L


 


 11/01/20 08:08    


 


 11/01/20 08:00    


 


 11/01/20 07:21    


 


 11/01/20 07:20   97  


 


 11/01/20 07:00    


 


 11/01/20 06:00    


 


 11/01/20 04:18    


 


 11/01/20 04:00    


 


 11/01/20 02:00    








                                     Weight











Admit Weight                   176 lb 8 oz


 


Weight                         202 lb 2.622 oz











                            Most Recent Monitor Data











Heart Rate from ECG            59


 


NIBP                           172/54


 


NIBP BP-Mean                   93


 


Respiration from ECG           26


 


SpO2                           100














I&O: 


                                        











 10/31/20 11/01/20 11/02/20





 07:59 06:59 06:59


 


Intake Total   250


 


Output Total   140


 


Balance   110











Result Diagrams: 


                                 11/01/20 05:15





                                 11/01/20 05:15


Additional Labs: 


                                   Accuchecks











  11/01/20 10/31/20 10/31/20





  10:40 22:12 17:26


 


POC Glucose  85  110 H  85














Hospitalist ROS





- Medication


Medications: 


Active Medications











Generic Name Dose Route Start Last Admin





  Trade Name Freq  PRN Reason Stop Dose Admin


 


Acetaminophen  650 mg  10/12/20 14:11  10/17/20 22:18





  Acetaminophen 325 Mg Tab  PO   650 mg





  Q4H PRN   Administration





  Headache/Fever/Mild Pain (1-3)  


 


Amlodipine Besylate  5 mg  10/13/20 09:00  11/01/20 08:08





  Amlodipine 5 Mg Tab  PO   5 mg





  BID SAIDA   Administration


 


Ascorbic Acid  1,000 mg  10/15/20 21:00  11/01/20 08:08





  Ascorbic Acid 500 Mg Chewable Tablet  PO   1,000 mg





  BID SAIDA   Administration


 


Brimonidine Tartrate  1 drop  10/13/20 09:00  11/01/20 08:48





  Brimonidine Tartrate 0.2% Ophth Soln 5 Ml Bottle  R EYE   1 drop





  BID SAIDA   Administration


 


Calcitriol  0.25 mcg  10/13/20 09:00  11/01/20 08:08





  Calcitriol 0.25 Mcg Cap  PO   0.25 mcg





  DAILY SAIDA   Administration


 


Cholecalciferol  1,000 units  10/13/20 09:00  11/01/20 10:25





  Cholecalciferol 1,000 Units (25 Mcg) Tab  PO   1,000 units





  DAILY SAIDA   Administration


 


Dextrose/Water  25 gm  10/12/20 14:15  10/31/20 16:35





  Dextrose 50% Abboject 50 Ml Syringe  SLOW IVP   25 gm





  PRN PRN   Administration





  Hypoglycemia  


 


Diphenhydramine HCl  25 mg  10/14/20 04:24  10/20/20 20:11





  Diphenhydramine 25 Mg Cap  PO   25 mg





  HSPRN PRN   Administration





  Insomnia  


 


Dorzolamide/Timolol  1 drop  10/13/20 09:00  11/01/20 08:16





  Dorzolamide/Timolol 2%/0.5% Ophth Soln 10 Ml Bottle  EA EYE   1 each





  TID SAIDA   Administration


 


Fondaparinux  5 mg  10/26/20 06:00  11/01/20 08:11





  Fondaparinux Sodium 10 Mg/0.8 Ml Syringe  SC   5 mg





  DAILY@0600 SAIDA   Administration


 


Guaifenesin  600 mg  10/13/20 21:00  11/01/20 08:09





  Guaifenesin Er 600 Mg Tab  PO   600 mg





  Q12HR SAIDA   Administration


 


Hydralazine HCl  50 mg  10/13/20 09:00  11/01/20 08:08





  Hydralazine 25 Mg Tab  PO   50 mg





  TID SAIDA   Administration


 


Dextrose/Water  1,000 mls @ 0 mls/hr  10/12/20 14:15  10/31/20 04:59





  D5w  IV   1,000 mls





  .Q0M PRN   Administration





  Hypoglycemia  





  As Directed  


 


Fentanyl Citrate 2,000 mcg/  100 mls @ 0 mls/hr  10/23/20 09:45  10/31/20 21:47





  Sodium Chloride  IV  11/22/20 09:45  100 mls





  INF SAIDA   Administration





  Protocol  





  Per Protocol  


 


Insulin Human Lispro  0 units  10/12/20 21:21  10/17/20 22:19





  Humalog 300 Units/3 Ml Vial  SC   2 unit





  .BEDTIME SLIDING SC PRN   Administration





  Bedtime Correctional Scale  


 


Insulin Human Lispro  0 units  10/28/20 10:45  10/30/20 06:23





  Humalog 300 Units/3 Ml Vial  SC   6 unit





  .MODERATE SLIDING SC PRN   Administration





  MODERATE SLIDING SCALE  





  Protocol  


 


Isosorbide Dinitrate  20 mg  10/22/20 21:00  11/01/20 12:30





  Isosorbide Dinitrate 20 Mg Tab  PO   20 mg





  BID SAIDA   Administration


 


Labetalol HCl  10 mg  10/12/20 14:18  10/14/20 01:50





  Labetalol Hcl 100 Mg/20 Ml Vial  SLOW IVP   2 ml





  Q4H PRN   Administration





  SBP Greater Than 180  


 


Lactulose  30 gm  10/29/20 09:00  10/31/20 09:32





  Lactulose 20 Gm/30 Ml Udcup  PO   30 gm





  Q2D SAIDA   Administration


 


Levothyroxine Sodium  25 mcg  10/17/20 06:00  11/01/20 06:29





  Levothyroxine Sodium 25 Mcg Tab  PO   25 mcg





  0600 SAIDA   Administration


 


Lorazepam  2 mg  10/23/20 09:45  11/01/20 12:46





  Lorazepam 2 Mg/Ml Vial  SLOW IVP  11/22/20 09:45  2 mg





  Q1H PRN   Administration





  Breakthrough agitation  


 


Methylprednisolone Sodium Succinate  40 mg  10/17/20 21:00  11/01/20 08:09





  Methylprednisolone Sod Succ 40 Mg Vial  IVP   40 mg





  Q12HR SAIDA   Administration


 


Pantoprazole Sodium  40 mg  10/27/20 09:00  11/01/20 08:09





  Pantoprazole 40 Mg Vial  IVP   40 mg





  DAILY SAIDA   Administration


 


Polyvinyl Alcohol/Povidone  1 each  10/13/20 09:00  11/01/20 08:30





  Polyvinyl Alcohol 1.4%/Povidone 0.6% Opth Drops  EA EYE   1 each





  DAILY SAIDA   Administration


 


Propofol  1,000 mg  10/23/20 09:45  11/01/20 06:29





  Propofol 1,000 Mg/100 Ml Vial  IV  11/22/20 09:45  1,000 mg





  INF PRN   Administration





  TO ACHIEVE GOAL RASS  





  Protocol  


 


Propylene Glycol  1 drop  10/13/20 09:00  11/01/20 08:24





  Polyethylene Glycol Opth Drop 15 Ml Bot  EA EYE   1 each





  BID SAIDA   Administration


 


Scopolamine  1.5 mg  10/25/20 11:00  10/31/20 11:16





  Scopolamine 1.5 Mg/72 Hour Patch  TD   1.5 mg





  Q3D SAIDA   Administration


 


Simvastatin  10 mg  10/19/20 21:00  10/31/20 20:26





  Simvastatin 10 Mg Tab  PO   10 mg





  2100 SAIDA   Administration


 


Sodium Chloride  10 ml  10/13/20 09:00  11/01/20 08:16





  Flush - Normal Saline 10 Ml Syringe  IVF   10 ml





  Q12HR SAIDA   Administration


 


Sodium Polystyrene Sulfonate  30 gm  10/29/20 09:00  10/31/20 09:32





  Sodium Polystyrene Sulfonate 15 Gm/60 Ml Bot  PO   30 gm





  Q2D SAIDA   Administration


 


Sterile Water  1 ml  10/17/20 11:00  10/29/20 23:51





  Bacteriostatic Water 30 Ml Vial  FS   1 ml





  PRN PRN   Administration





  RECONSTITUTION  


 


Throat Lozenges  1 gloria  10/18/20 23:28  10/19/20 00:13





  Cepastat Lozenges 1 Gloria  PO   1 gloria





  Q2H PRN   Administration





  Sore Throat  


 


Vecuronium Bromide  10 mg  10/23/20 11:13  11/01/20 11:36





  Vecuronium 10 Mg Vial  IV   10 mg





  Q1H PRN   Administration





   SEDATION  


 


Zinc Sulfate  220 mg  10/16/20 09:00  11/01/20 08:09





  Zinc Sulfate 220 Mg Cap  PO   220 mg





  DAILY SAIDA   Administration














- Exam


General Appearance: ill appearing


Eye: PERRL, anicteric sclera


ENT: no oropharyngeal lesions, dry oral mucosa


Neck: supple, no JVD


Heart: RRR, no murmur


Respiratory: no wheezes, no rales, rhonchi


Gastrointestinal: soft, non-tender, non-distended, normal bowel sounds


Extremities: no cyanosis, 1+ LE edema


Neurological: cranial nerve grossly intact, no focal deficits





Hosp A/P


(1) Acute respiratory failure with hypoxia


Code(s): J96.01 - ACUTE RESPIRATORY FAILURE WITH HYPOXIA   Status: Acute   





(2) Metabolic acidosis


Code(s): E87.2 - ACIDOSIS   Status: Resolved   





(3) Pneumonia due to COVID-19 virus


Code(s): U07.1 - COVID-19; J12.89 - OTHER VIRAL PNEUMONIA   Status: Acute   





(4) Acute worsening of stage 3 chronic kidney disease


Code(s): N18.3 - CHRONIC KIDNEY DISEASE, STAGE 3 (MODERATE) * DO NOT USE *   

Status: Acute   





(5) Anemia, normocytic normochromic


Code(s): D64.9 - ANEMIA, UNSPECIFIED   Status: Chronic   





(6) Chronic diastolic heart failure


Code(s): I50.32 - CHRONIC DIASTOLIC (CONGESTIVE) HEART FAILURE   Status: Chronic

  





(7) Diabetic nephropathy


Status: Chronic   


Qualifiers: 


   Diabetes mellitus type: type 2   Qualified Code(s): E11.21 - Type 2 diabetes 

mellitus with diabetic nephropathy   





(8) Hyperlipidemia


Code(s): E78.5 - HYPERLIPIDEMIA, UNSPECIFIED   Status: Chronic   


Qualifiers: 


   Hyperlipidemia type: unspecified   Qualified Code(s): E78.5 - Hyperlipidemia,

unspecified   





(9) Hypertension


Code(s): I10 - ESSENTIAL (PRIMARY) HYPERTENSION   Status: Chronic   


Qualifiers: 


   Hypertension type: essential hypertension   Qualified Code(s): I10 - 

Essential (primary) hypertension   





(10) Moderate aortic regurgitation


Code(s): I35.1 - NONRHEUMATIC AORTIC (VALVE) INSUFFICIENCY   Status: Chronic   





(11) Thrombocytopenia


Code(s): D69.6 - THROMBOCYTOPENIA, UNSPECIFIED   Status: Acute   





(12) DM type 2 (diabetes mellitus, type 2)


Status: Chronic   


Qualifiers: 


   Diabetes mellitus long term insulin use: with long term use   Diabetes 

mellitus complication status: with kidney complications   Diabetes mellitus 

complication detail: with chronic kidney disease   Chronic kidney disease stage:

stage 3 (moderate) 





- Plan





recieved convalescent plasma on 10/13, currently on solumedrol 40mg q12h


weaning  per pulm adv, is still requiring high fio2 and peep, day #9 on vent


recieved 2 u prbc on 10/30, h/h stable


was initiated on HD on 10/29, has had back to back HD with volume removal


continue norvasc, isordil, synthorid, zocor, fondaparinux (dvt prophylaxis)


was taken off lantus due to labile dm and lopressor due to bradycardia.


prognosis guarded


will likely need a central line, has gained around 37lbs since admission with 

edema now.
- Subjective


Encounter Date: 11/02/20


Encounter Time: 11:00


non-verbal


Subjective: 


Patient seen and examined for respiratory failure due to COVID-19 pneumonia.  

Remains on mechanical ventilation.





- Objective


Vital Signs & Weight: 


                             Vital Signs (12 hours)











  Temp Pulse Pulse Pulse Resp BP BP


 


 11/02/20 16:00  98.6 F      


 


 11/02/20 15:54    71  72    169/65 H


 


 11/02/20 14:59   70     183/76 H 


 


 11/02/20 14:19   76     184/77 H 


 


 11/02/20 12:00  98.9 F      


 


 11/02/20 11:10   71     123/49 L 


 


 11/02/20 08:34   68     145/60 H 


 


 11/02/20 08:00  98.9 F      


 


 11/02/20 07:16   68     145/60 H 


 


 11/02/20 06:00      26 H  














  BP Pulse Ox Pulse Ox Pulse Ox


 


 11/02/20 16:00    


 


 11/02/20 15:54  165/60 H   97  97


 


 11/02/20 14:59    


 


 11/02/20 14:19    


 


 11/02/20 12:00    


 


 11/02/20 11:10    


 


 11/02/20 08:34    


 


 11/02/20 08:00   93 L  


 


 11/02/20 07:16    


 


 11/02/20 06:00    








                                     Weight











Admit Weight                   176 lb 8 oz


 


Weight                         205 lb 4.006 oz











                            Most Recent Monitor Data











Heart Rate from ECG            72


 


NIBP                           164/64


 


NIBP BP-Mean                   97


 


Respiration from ECG           21


 


SpO2                           95














I&O: 


                                        











 11/01/20 11/02/20 11/03/20





 06:59 06:59 06:59


 


Intake Total  1809 181


 


Output Total  725 647


 


Balance  1084 -466











Result Diagrams: 


                                 11/02/20 03:04





                                 11/02/20 03:04


Additional Labs: 


                                   Accuchecks











  11/02/20 11/02/20 11/01/20





  15:51 10:06 22:18


 


POC Glucose  194 H  129 H  83














  10/31/20





  16:32


 


POC Glucose  39 L*








                                        


Abnormal Lab Results - Last 48 hrs





11/01/20 05:15: BUN 64 H, Creatinine 1.31 H, Calcium 7.0 L


11/01/20 05:15: D-Dimer 1.92 H


11/01/20 05:15: RBC 3.14 L, Hgb 9.6 L, Hct 28.0 L, Plt Count 74 L, Neutrophils %

(Manual) 95 H, Lymphocytes % (Manual) 1 L, Plt Morphology Comment Appears 

Decreased L


11/01/20 06:43: Bicarbonate Actual 29.5 H, ABG pCO2 50.4 H, ABG O2 Sat 

(Measured) 91.6 L, ABG O2 Content 13.0 L, ABG Base Excess 3.7 H, ABG Hematocrit 

30.0 L, ABG Hemoglobin 10.1 L, ABG Oxyhemoglobin 91.3 L, ABG Deoxyhemoglobin 8.4

H, A-a O2 Gradient 231.600 H, Sodium 134 L, Ionized Calcium 1.07 L


11/02/20 03:04: Sodium 134 L, Chloride 97 L, BUN 50 H, Creatinine 1.25 H, 

Calcium 7.2 L, C-Reactive Protein 2.96 H


11/02/20 03:04: D-Dimer 2.58 H


11/02/20 03:04: WBC 16.6 H, RBC 3.58 L, Hgb 10.9 L, Hct 31.5 L, Plt Count 83 L, 

Neutrophils % (Manual) 93 H, Lymphocytes % (Manual) 2 L, Plt Morphology Comment 

Appears Decreased L


11/02/20 07:11: ABG pO2 56.8 L*, ABG O2 Sat (Measured) 90.3 L, ABG O2 Content 

12.7 L, ABG Hematocrit 29.0 L, ABG Hemoglobin 10.0 L, ABG Oxyhemoglobin 90.0 L, 

ABG Deoxyhemoglobin 9.7 H, A-a O2 Gradient 249.950 H, Sodium 131 L, Ionized 

Calcium 1.03 L





Microbiology - Entire Visit





10/12/20 11:34   Venous blood - Left Arm   Blood Culture - Final


                            NO GROWTH IN 5 DAYS


10/12/20 11:42   Venous blood - Right Arm   Blood Culture - Final


                            NO GROWTH IN 5 DAYS


10/14/20 09:45   Urine voided   Urine Culture - Final


                            Klebsiella pneumoniae ssp pneu








EKG Reviewed by me: Yes (Sinus rhythm on telemetry)





Hospitalist ROS





- Review of Systems


ROS unobtainable: due to mental status





- Medication


Medications: 


Active Medications











Generic Name Dose Route Start Last Admin





  Trade Name Freq  PRN Reason Stop Dose Admin


 


Acetaminophen  650 mg  10/12/20 14:11  10/17/20 22:18





  Acetaminophen 325 Mg Tab  PO   650 mg





  Q4H PRN   Administration





  Headache/Fever/Mild Pain (1-3)  


 


Amlodipine Besylate  5 mg  10/13/20 09:00  11/02/20 08:34





  Amlodipine 5 Mg Tab  PO   5 mg





  BID SAIDA   Administration


 


Ascorbic Acid  1,000 mg  10/15/20 21:00  11/02/20 08:34





  Ascorbic Acid 500 Mg Chewable Tablet  PO   1,000 mg





  BID SAIDA   Administration


 


Brimonidine Tartrate  1 drop  10/13/20 09:00  11/02/20 08:36





  Brimonidine Tartrate 0.2% Ophth Soln 5 Ml Bottle  R EYE   1 drop





  BID SAIDA   Administration


 


Calcitriol  0.25 mcg  10/13/20 09:00  11/02/20 08:34





  Calcitriol 0.25 Mcg Cap  PO   0.25 mcg





  DAILY SAIDA   Administration


 


Cholecalciferol  1,000 units  10/13/20 09:00  11/02/20 08:44





  Cholecalciferol 1,000 Units (25 Mcg) Tab  PO   1,000 units





  DAILY SAIDA   Administration


 


Dextrose/Water  25 gm  10/12/20 14:15  11/01/20 17:30





  Dextrose 50% Abboject 50 Ml Syringe  SLOW IVP   25 gm





  PRN PRN   Administration





  Hypoglycemia  


 


Diphenhydramine HCl  25 mg  10/14/20 04:24  10/20/20 20:11





  Diphenhydramine 25 Mg Cap  PO   25 mg





  HSPRN PRN   Administration





  Insomnia  


 


Dorzolamide/Timolol  1 drop  10/13/20 09:00  11/02/20 15:06





  Dorzolamide/Timolol 2%/0.5% Ophth Soln 10 Ml Bottle  EA EYE   1 each





  TID SAIDA   Administration


 


Fondaparinux  5 mg  10/26/20 06:00  11/02/20 06:24





  Fondaparinux Sodium 10 Mg/0.8 Ml Syringe  SC   5 mg





  DAILY@0600 SAIDA   Administration


 


Guaifenesin  600 mg  10/13/20 21:00  11/02/20 08:34





  Guaifenesin Er 600 Mg Tab  PO   600 mg





  Q12HR SAIDA   Administration


 


Hydralazine HCl  50 mg  10/13/20 09:00  11/02/20 14:59





  Hydralazine 25 Mg Tab  PO   50 mg





  TID SAIDA   Administration


 


Dextrose/Water  1,000 mls @ 0 mls/hr  10/12/20 14:15  11/02/20 02:43





  D5w  IV   1,000 mls





  .Q0M PRN   Administration





  Hypoglycemia  





  As Directed  


 


Dextrose/Water  1,000 mls @ 30 mls/hr  11/02/20 07:30  11/02/20 08:37





  Dextrose 10% In Water  IV   1,000 mls





  .Q24H SAIDA   Administration


 


Fentanyl Citrate 2,000 mcg/  100 mls @ 0 mls/hr  11/02/20 13:45  11/02/20 15:04





  Sodium Chloride  IV   100 mls





  INF SAIDA   Administration





  Protocol  





  Per Protocol  


 


Insulin Human Lispro  0 units  10/12/20 21:21  10/17/20 22:19





  Humalog 300 Units/3 Ml Vial  SC   2 unit





  .BEDTIME SLIDING SC PRN   Administration





  Bedtime Correctional Scale  


 


Insulin Human Lispro  0 units  10/28/20 10:45  10/30/20 06:23





  Humalog 300 Units/3 Ml Vial  SC   6 unit





  .MODERATE SLIDING SC PRN   Administration





  MODERATE SLIDING SCALE  





  Protocol  


 


Isosorbide Dinitrate  20 mg  10/22/20 21:00  11/02/20 08:44





  Isosorbide Dinitrate 20 Mg Tab  PO   20 mg





  BID SAIDA   Administration


 


Labetalol HCl  10 mg  10/12/20 14:18  11/02/20 14:19





  Labetalol Hcl 100 Mg/20 Ml Vial  SLOW IVP   10 mg





  Q4H PRN   Administration





  SBP Greater Than 180  


 


Lactulose  30 gm  10/29/20 09:00  11/02/20 08:45





  Lactulose 20 Gm/30 Ml Udcup  PO   30 gm





  Q2D SAIDA   Administration


 


Levothyroxine Sodium  25 mcg  10/17/20 06:00  11/02/20 06:23





  Levothyroxine Sodium 25 Mcg Tab  PO   25 mcg





  0600 SAIDA   Administration


 


Lorazepam  2 mg  11/02/20 14:53  11/02/20 14:59





  Lorazepam 2 Mg/Ml Vial  SLOW IVP  12/02/20 14:54  2 mg





  Q1H PRN   Administration





  Breakthrough agitation  


 


Methylprednisolone Sodium Succinate  40 mg  10/17/20 21:00  11/02/20 08:34





  Methylprednisolone Sod Succ 40 Mg Vial  IVP   40 mg





  Q12HR SAIDA   Administration


 


Pantoprazole Sodium  40 mg  10/27/20 09:00  11/02/20 08:34





  Pantoprazole 40 Mg Vial  IVP   40 mg





  DAILY SAIDA   Administration


 


Polyvinyl Alcohol/Povidone  1 each  10/13/20 09:00  11/02/20 08:36





  Polyvinyl Alcohol 1.4%/Povidone 0.6% Opth Drops  EA EYE   1 each





  DAILY SAIDA   Administration


 


Propofol  1,000 mg  10/23/20 09:45  11/02/20 13:19





  Propofol 1,000 Mg/100 Ml Vial  IV  11/22/20 09:45  1,000 mg





  INF PRN   Administration





  TO ACHIEVE GOAL RASS  





  Protocol  


 


Propylene Glycol  1 drop  10/13/20 09:00  11/02/20 08:35





  Polyethylene Glycol Opth Drop 15 Ml Bot  EA EYE   1 each





  BID SAIDA   Administration


 


Scopolamine  1.5 mg  10/25/20 11:00  10/31/20 11:16





  Scopolamine 1.5 Mg/72 Hour Patch  TD   1.5 mg





  Q3D SAIDA   Administration


 


Simvastatin  10 mg  10/19/20 21:00  11/01/20 21:13





  Simvastatin 10 Mg Tab  PO   10 mg





  2100 SAIDA   Administration


 


Sodium Chloride  10 ml  10/13/20 09:00  11/02/20 08:37





  Flush - Normal Saline 10 Ml Syringe  IVF   10 ml





  Q12HR SAIDA   Administration


 


Sodium Polystyrene Sulfonate  30 gm  10/29/20 09:00  11/02/20 08:45





  Sodium Polystyrene Sulfonate 15 Gm/60 Ml Bot  PO   30 gm





  Q2D SAIDA   Administration


 


Sterile Water  1 ml  10/17/20 11:00  10/29/20 23:51





  Bacteriostatic Water 30 Ml Vial  FS   1 ml





  PRN PRN   Administration





  RECONSTITUTION  


 


Throat Lozenges  1 gloria  10/18/20 23:28  10/19/20 00:13





  Cepastat Lozenges 1 Lgoria  PO   1 gloria





  Q2H PRN   Administration





  Sore Throat  


 


Vecuronium Bromide  10 mg  10/23/20 11:13  11/01/20 18:32





  Vecuronium 10 Mg Vial  IV   10 mg





  Q1H PRN   Administration





   SEDATION  


 


Zinc Sulfate  220 mg  10/16/20 09:00  11/02/20 08:34





  Zinc Sulfate 220 Mg Cap  PO   220 mg





  DAILY SAIDA   Administration














- Exam


General Appearance: ill appearing


General - other findings: Intubated on mechanical ventilation


Heart: RRR, no gallops


Respiratory: rales, rhonchi


Gastrointestinal: soft, no guarding, no rigidity


Extremities: no cyanosis


Neurological - other findings: Neuro/psychcannot assess due to sedation





Hosp A/P





- Plan


DVT proph w/SCDs





Acute hypoxic and hypercapnic respiratory failure due to COVID-19 pneumonia


Diabetes mellitus type 2labile


Hypertension


Thrombocytopenia


RAMONA on CKD stage III


Other issues per previous note





Plan:


Remains on mechanical ventilation.  On IV steroids.  On fondaparinux for DVT 

prophylaxis.  S/p convalescent plasma.  A.m. labs
- Subjective


Encounter Date: 11/03/20


Encounter Time: 10:20


Subjective: 


is on vent, sedated





- Objective


Vital Signs & Weight: 


                             Vital Signs (12 hours)











  Temp Pulse Pulse Pulse Resp BP BP


 


 11/03/20 14:01   72     133/54 L 


 


 11/03/20 14:00      37 H  


 


 11/03/20 12:00  98.7 F     26 H  


 


 11/03/20 10:45    81  76    187/70 H


 


 11/03/20 10:09   72     183/70 H 


 


 11/03/20 10:04   85     183/70 H 


 


 11/03/20 10:00      28 H  


 


 11/03/20 08:00      36 H  


 


 11/03/20 07:40   85     203/82 H 


 


 11/03/20 07:16   85     203/82 H 


 


 11/03/20 06:00      30 H  


 


 11/03/20 04:00      26 H  














  BP Pulse Ox Pulse Ox Pulse Ox


 


 11/03/20 14:01    


 


 11/03/20 14:00    


 


 11/03/20 12:00    


 


 11/03/20 10:45  182/68 H   97  99


 


 11/03/20 10:09    


 


 11/03/20 10:04    


 


 11/03/20 10:00    


 


 11/03/20 08:00   97  


 


 11/03/20 07:40    


 


 11/03/20 07:16    


 


 11/03/20 06:00    


 


 11/03/20 04:00    








                                     Weight











Admit Weight                   176 lb 8 oz


 


Weight                         200 lb 9.93 oz











                            Most Recent Monitor Data











Heart Rate from ECG            69


 


NIBP                           119/54


 


NIBP BP-Mean                   75


 


Respiration from ECG           26


 


SpO2                           98














I&O: 


                                        











 11/02/20 11/03/20 11/04/20





 06:59 06:59 06:59


 


Intake Total 1809 1653.2 75


 


Output Total 725 1537 505


 


Balance 1084 116.2 -430











Result Diagrams: 


                                 11/03/20 04:45





                                 11/03/20 04:45


Additional Labs: 


                                   Accuchecks











  11/03/20 11/02/20 11/02/20





  10:28 21:50 15:51


 


POC Glucose  173 H  178 H  194 H














Hospitalist ROS





- Medication


Medications: 


Active Medications











Generic Name Dose Route Start Last Admin





  Trade Name Freq  PRN Reason Stop Dose Admin


 


Acetaminophen  650 mg  10/12/20 14:11  10/17/20 22:18





  Acetaminophen 325 Mg Tab  PO   650 mg





  Q4H PRN   Administration





  Headache/Fever/Mild Pain (1-3)  


 


Amlodipine Besylate  5 mg  10/13/20 09:00  11/03/20 07:40





  Amlodipine 5 Mg Tab  PO   5 mg





  BID SAIDA   Administration


 


Ascorbic Acid  1,000 mg  10/15/20 21:00  11/03/20 07:41





  Ascorbic Acid 500 Mg Chewable Tablet  PO   1,000 mg





  BID SAIDA   Administration


 


Brimonidine Tartrate  1 drop  10/13/20 09:00  11/03/20 09:25





  Brimonidine Tartrate 0.2% Ophth Soln 5 Ml Bottle  R EYE   1 drop





  BID SAIDA   Administration


 


Calcitriol  0.25 mcg  10/13/20 09:00  11/03/20 07:41





  Calcitriol 0.25 Mcg Cap  PO   0.25 mcg





  DAILY SAIDA   Administration


 


Cholecalciferol  1,000 units  10/13/20 09:00  11/03/20 09:38





  Cholecalciferol 1,000 Units (25 Mcg) Tab  PO   1,000 units





  DAILY SAIDA   Administration


 


Dextrose/Water  25 gm  10/12/20 14:15  11/01/20 17:30





  Dextrose 50% Abboject 50 Ml Syringe  SLOW IVP   25 gm





  PRN PRN   Administration





  Hypoglycemia  


 


Diphenhydramine HCl  25 mg  10/14/20 04:24  10/20/20 20:11





  Diphenhydramine 25 Mg Cap  PO   25 mg





  HSPRN PRN   Administration





  Insomnia  


 


Dorzolamide/Timolol  1 drop  10/13/20 09:00  11/03/20 14:02





  Dorzolamide/Timolol 2%/0.5% Ophth Soln 10 Ml Bottle  EA EYE   1 each





  TID SAIDA   Administration


 


Fondaparinux  5 mg  10/26/20 06:00  11/03/20 06:25





  Fondaparinux Sodium 10 Mg/0.8 Ml Syringe  SC   5 mg





  DAILY@0600 SAIDA   Administration


 


Furosemide  80 mg  11/03/20 14:00  11/03/20 13:10





  Furosemide 100 Mg/10 Ml Vial  SLOW IVP   80 mg





  0600,1400 SAIDA   Administration


 


Guaifenesin  600 mg  11/03/20 13:00  11/03/20 14:01





  Diabetic Tussin 200 Mg/10 Ml Udcup  PER TUBE   600 mg





  Q4HR SAIDA   Administration


 


Hydralazine HCl  50 mg  10/13/20 09:00  11/03/20 14:01





  Hydralazine 25 Mg Tab  PO   50 mg





  TID SAIDA   Administration


 


Dextrose/Water  1,000 mls @ 0 mls/hr  10/12/20 14:15  11/02/20 02:43





  D5w  IV   1,000 mls





  .Q0M PRN   Administration





  Hypoglycemia  





  As Directed  


 


Dextrose/Water  1,000 mls @ 30 mls/hr  11/02/20 07:30  11/03/20 10:30





  Dextrose 10% In Water  IV   Not Given





  .Q24H SAIDA  


 


Fentanyl Citrate 2,000 mcg/  100 mls @ 0 mls/hr  11/02/20 13:45  11/02/20 15:04





  Sodium Chloride  IV   100 mls





  INF SAIDA   Administration





  Protocol  





  Per Protocol  


 


Dexmedetomidine HCl 400 mcg/  100 mls @ 0 mls/hr  11/03/20 13:30  11/03/20 15:17





  Sodium Chloride  IVPB   100 mls





  INF SAIDA   Administration





  Protocol  





  Per Protocol  


 


Insulin Human Lispro  0 units  10/12/20 21:21  10/17/20 22:19





  Humalog 300 Units/3 Ml Vial  SC   2 unit





  .BEDTIME SLIDING SC PRN   Administration





  Bedtime Correctional Scale  


 


Insulin Human Lispro  0 units  10/28/20 10:45  11/03/20 10:31





  Humalog 300 Units/3 Ml Vial  SC   2 unit





  .MODERATE SLIDING SC PRN   Administration





  MODERATE SLIDING SCALE  





  Protocol  


 


Isosorbide Dinitrate  20 mg  10/22/20 21:00  11/03/20 07:40





  Isosorbide Dinitrate 20 Mg Tab  PO   20 mg





  BID SAIDA   Administration


 


Labetalol HCl  10 mg  10/12/20 14:18  11/03/20 10:04





  Labetalol Hcl 100 Mg/20 Ml Vial  SLOW IVP   10 mg





  Q4H PRN   Administration





  SBP Greater Than 180  


 


Lactulose  30 gm  10/29/20 09:00  11/02/20 08:45





  Lactulose 20 Gm/30 Ml Udcup  PO   30 gm





  Q2D SAIDA   Administration


 


Levothyroxine Sodium  25 mcg  10/17/20 06:00  11/03/20 06:26





  Levothyroxine Sodium 25 Mcg Tab  PO   25 mcg





  0600 SAIDA   Administration


 


Lorazepam  2 mg  11/02/20 14:53  11/02/20 22:44





  Lorazepam 2 Mg/Ml Vial  SLOW IVP  12/02/20 14:54  2 mg





  Q1H PRN   Administration





  Breakthrough agitation  


 


Methylprednisolone Sodium Succinate  40 mg  10/17/20 21:00  11/03/20 08:05





  Methylprednisolone Sod Succ 40 Mg Vial  IVP   40 mg





  Q12HR SAIDA   Administration


 


Pantoprazole Sodium  40 mg  10/27/20 09:00  11/03/20 08:05





  Pantoprazole 40 Mg Vial  IVP   40 mg





  DAILY SAIDA   Administration


 


Polyvinyl Alcohol/Povidone  1 each  10/13/20 09:00  11/03/20 09:27





  Polyvinyl Alcohol 1.4%/Povidone 0.6% Opth Drops  EA EYE   1 each





  DAILY SAIDA   Administration


 


Propofol  1,000 mg  10/23/20 09:45  11/03/20 11:20





  Propofol 1,000 Mg/100 Ml Vial  IV  11/22/20 09:45  1,000 mg





  INF PRN   Administration





  TO ACHIEVE GOAL RASS  





  Protocol  


 


Propylene Glycol  1 drop  10/13/20 09:00  11/03/20 09:26





  Polyethylene Glycol Opth Drop 15 Ml Bot  EA EYE   1 each





  BID SAIDA   Administration


 


Scopolamine  1.5 mg  10/25/20 11:00  11/03/20 10:08





  Scopolamine 1.5 Mg/72 Hour Patch  TD   1.5 mg





  Q3D SAIDA   Administration


 


Simvastatin  10 mg  10/19/20 21:00  11/02/20 20:23





  Simvastatin 10 Mg Tab  PO   10 mg





  2100 SAIDA   Administration


 


Sodium Chloride  10 ml  10/13/20 09:00  11/03/20 09:28





  Flush - Normal Saline 10 Ml Syringe  IVF   10 ml





  Q12HR SAIDA   Administration


 


Sodium Polystyrene Sulfonate  30 gm  10/29/20 09:00  11/02/20 08:45





  Sodium Polystyrene Sulfonate 15 Gm/60 Ml Bot  PO   30 gm





  Q2D SAIDA   Administration


 


Sterile Water  1 ml  10/17/20 11:00  10/29/20 23:51





  Bacteriostatic Water 30 Ml Vial  FS   1 ml





  PRN PRN   Administration





  RECONSTITUTION  


 


Throat Lozenges  1 gloria  10/18/20 23:28  10/19/20 00:13





  Cepastat Lozenges 1 Gloria  PO   1 gloria





  Q2H PRN   Administration





  Sore Throat  


 


Vecuronium Bromide  10 mg  10/23/20 11:13  11/02/20 22:45





  Vecuronium 10 Mg Vial  IV   10 mg





  Q1H PRN   Administration





   SEDATION  


 


Zinc Sulfate  220 mg  10/16/20 09:00  11/03/20 07:41





  Zinc Sulfate 220 Mg Cap  PO   220 mg





  DAILY SAIDA   Administration














- Exam


Eye: PERRL, anicteric sclera


ENT: no oropharyngeal lesions, dry oral mucosa


Neck: supple, no JVD


Heart: RRR, no murmur


Respiratory: no wheezes, no rales, rhonchi


Gastrointestinal: soft, non-tender, non-distended, normal bowel sounds


Extremities: no cyanosis, 1+ LE edema


Neurological: cranial nerve grossly intact, no focal deficits





Hosp A/P


(1) Acute respiratory failure with hypoxia


Code(s): J96.01 - ACUTE RESPIRATORY FAILURE WITH HYPOXIA   Status: Acute   





(2) Metabolic acidosis


Code(s): E87.2 - ACIDOSIS   Status: Resolved   





(3) Pneumonia due to COVID-19 virus


Code(s): U07.1 - COVID-19; J12.89 - OTHER VIRAL PNEUMONIA   Status: Acute   





(4) Acute worsening of stage 3 chronic kidney disease


Code(s): N18.3 - CHRONIC KIDNEY DISEASE, STAGE 3 (MODERATE) * DO NOT USE *   

Status: Acute   





(5) Anemia, normocytic normochromic


Code(s): D64.9 - ANEMIA, UNSPECIFIED   Status: Chronic   





(6) Chronic diastolic heart failure


Code(s): I50.32 - CHRONIC DIASTOLIC (CONGESTIVE) HEART FAILURE   Status: Chronic

  





(7) Diabetic nephropathy


Status: Chronic   


Qualifiers: 


   Diabetes mellitus type: type 2   Qualified Code(s): E11.21 - Type 2 diabetes 

mellitus with diabetic nephropathy   





(8) Hyperlipidemia


Code(s): E78.5 - HYPERLIPIDEMIA, UNSPECIFIED   Status: Chronic   


Qualifiers: 


   Hyperlipidemia type: unspecified   Qualified Code(s): E78.5 - Hyperlipidemia,

unspecified   





(9) Hypertension


Code(s): I10 - ESSENTIAL (PRIMARY) HYPERTENSION   Status: Chronic   


Qualifiers: 


   Hypertension type: essential hypertension   Qualified Code(s): I10 - 

Essential (primary) hypertension   





(10) Moderate aortic regurgitation


Code(s): I35.1 - NONRHEUMATIC AORTIC (VALVE) INSUFFICIENCY   Status: Chronic   





(11) Thrombocytopenia


Code(s): D69.6 - THROMBOCYTOPENIA, UNSPECIFIED   Status: Acute   





(12) DM type 2 (diabetes mellitus, type 2)


Status: Chronic   


Qualifiers: 


   Diabetes mellitus long term insulin use: with long term use   Diabetes 

mellitus complication status: with kidney complications   Diabetes mellitus 

complication detail: with chronic kidney disease   Chronic kidney disease stage:

stage 3 (moderate) 





- Plan





recieved convalescent plasma on 10/13, currently on solumedrol 40mg q12h


weaning  per pulm adv, is on slow taper of fio2 and peep, day #11 on vent


recieved 2 u prbc on 10/30, h/h stable


was initiated on HD on 10/29, has had back to back HD with volume removal, off 

dialysis from last 2 days, urine output around 1500ml last 24hrs.


continue norvasc, isordil, synthorid, zocor, fondaparinux (dvt prophylaxis)


was taken off lantus due to labile dm and lopressor due to bradycardia.


prognosis guarded


will likely need a central line, tunneled cath, trach and peg, await 's 

adv when its safe to do so.
- Subjective


Encounter Date: 11/04/20


Encounter Time: 11:45


Subjective: 


is on vent, sedated


she didn't tolerate sedation being weaned early this am per staff, triggering 

prolonged vent alarms.





- Objective


Vital Signs & Weight: 


                             Vital Signs (12 hours)











  Temp Pulse Pulse Pulse Resp BP BP


 


 11/04/20 12:00      26 H  


 


 11/04/20 10:27   76     145/90 H 


 


 11/04/20 10:22    78  77    137/54 L


 


 11/04/20 09:52      33 H  


 


 11/04/20 08:42   82     161/65 H 


 


 11/04/20 08:00      26 H  


 


 11/04/20 07:00  98.7 F      


 


 11/04/20 06:53   82     161/65 H 


 


 11/04/20 06:00      29 H  


 


 11/04/20 05:00  98.4 F      


 


 11/04/20 04:00      30 H  


 


 11/04/20 02:02   82     169/65 H 


 


 11/04/20 02:00      30 H  














  BP Pulse Ox Pulse Ox Pulse Ox


 


 11/04/20 12:00    


 


 11/04/20 10:27    


 


 11/04/20 10:22  145/90 H   95  95


 


 11/04/20 09:52    


 


 11/04/20 08:42    


 


 11/04/20 08:00   92 L  


 


 11/04/20 07:00    


 


 11/04/20 06:53    


 


 11/04/20 06:00    


 


 11/04/20 05:00    


 


 11/04/20 04:00    


 


 11/04/20 02:02    


 


 11/04/20 02:00    








                                     Weight











Admit Weight                   176 lb 8 oz


 


Weight                         196 lb 13.965 oz











                            Most Recent Monitor Data











Heart Rate from ECG            89


 


NIBP                           156/73


 


NIBP BP-Mean                   100


 


Respiration from ECG           30


 


SpO2                           96














I&O: 


                                        











 11/03/20 11/04/20 11/05/20





 06:59 06:59 06:59


 


Intake Total 1653.2 1843 60


 


Output Total 1537 1235 650


 


Balance 116.2 608 -590











Result Diagrams: 


                                 11/04/20 04:00





                                 11/04/20 04:00


Additional Labs: 


                                   Accuchecks











  11/04/20 11/04/20 11/03/20





  09:52 04:07 20:40


 


POC Glucose  171 H  180 H  208 H














  11/03/20





  16:18


 


POC Glucose  248 H














Hospitalist ROS





- Medication


Medications: 


Active Medications











Generic Name Dose Route Start Last Admin





  Trade Name Allanq  PRN Reason Stop Dose Admin


 


Acetaminophen  650 mg  10/12/20 14:11  10/17/20 22:18





  Acetaminophen 325 Mg Tab  PO   650 mg





  Q4H PRN   Administration





  Headache/Fever/Mild Pain (1-3)  


 


Amlodipine Besylate  5 mg  10/13/20 09:00  11/04/20 08:42





  Amlodipine 5 Mg Tab  PO   5 mg





  BID SAIDA   Administration


 


Ascorbic Acid  1,000 mg  10/15/20 21:00  11/04/20 08:43





  Ascorbic Acid 500 Mg Chewable Tablet  PO   1,000 mg





  BID SAIDA   Administration


 


Brimonidine Tartrate  1 drop  10/13/20 09:00  11/04/20 08:44





  Brimonidine Tartrate 0.2% Ophth Soln 5 Ml Bottle  R EYE   1 drop





  BID SAIDA   Administration


 


Calcitriol  0.25 mcg  10/13/20 09:00  11/04/20 08:42





  Calcitriol 0.25 Mcg Cap  PO   0.25 mcg





  DAILY SAIDA   Administration


 


Cholecalciferol  1,000 units  10/13/20 09:00  11/04/20 09:10





  Cholecalciferol 1,000 Units (25 Mcg) Tab  PO   1,000 units





  DAILY SAIDA   Administration


 


Dextrose/Water  25 gm  10/12/20 14:15  11/01/20 17:30





  Dextrose 50% Abboject 50 Ml Syringe  SLOW IVP   25 gm





  PRN PRN   Administration





  Hypoglycemia  


 


Diphenhydramine HCl  25 mg  10/14/20 04:24  10/20/20 20:11





  Diphenhydramine 25 Mg Cap  PO   25 mg





  HSPRN PRN   Administration





  Insomnia  


 


Dorzolamide/Timolol  1 drop  10/13/20 09:00  11/04/20 08:44





  Dorzolamide/Timolol 2%/0.5% Ophth Soln 10 Ml Bottle  EA EYE   1 each





  TID SAIDA   Administration


 


Fondaparinux  5 mg  11/04/20 06:00  11/04/20 05:55





  Fondaparinux Sodium 2.5 Mg/0.5 Ml Syringe  SC   5 mg





  DAILY@0600 SAIDA   Administration


 


Furosemide  80 mg  11/03/20 14:00  11/04/20 04:58





  Furosemide 100 Mg/10 Ml Vial  SLOW IVP   80 mg





  0600,1400 SAIDA   Administration


 


Guaifenesin  600 mg  11/03/20 13:00  11/04/20 12:43





  Diabetic Tussin 200 Mg/10 Ml Udcup  PER TUBE   600 mg





  Q4HR SAIDA   Administration


 


Haloperidol Lactate  5 mg  11/04/20 12:30  11/04/20 12:44





  Haloperidol Lactate 5 Mg/Ml Vial  SLOW IVP  11/04/20 14:30  Not Given





  NOW SAIDA  


 


Hydralazine HCl  50 mg  10/13/20 09:00  11/04/20 08:42





  Hydralazine 25 Mg Tab  PO   50 mg





  TID SAIDA   Administration


 


Dextrose/Water  1,000 mls @ 0 mls/hr  10/12/20 14:15  11/02/20 02:43





  D5w  IV   1,000 mls





  .Q0M PRN   Administration





  Hypoglycemia  





  As Directed  


 


Dextrose/Water  1,000 mls @ 30 mls/hr  11/02/20 07:30  11/04/20 07:54





  Dextrose 10% In Water  IV   Not Given





  .Q24H SAIDA  


 


Dexmedetomidine HCl 400 mcg/  100 mls @ 0 mls/hr  11/03/20 13:30  11/04/20 10:29





  Sodium Chloride  IVPB   100 mls





  INF SAIDA   Administration





  Protocol  





  Per Protocol  


 


Nicardipine HCl 50 mg/ Sodium  40 mg in 250 mls @ 0 mls/hr  11/03/20 13:30  

11/04/20 10:29





  Chloride  IVPB   250 mls





  INF SAIDA   Administration





  Protocol  





  Titrate  


 


Insulin Human Lispro  0 units  10/12/20 21:21  10/17/20 22:19





  Humalog 300 Units/3 Ml Vial  SC   2 unit





  .BEDTIME SLIDING SC PRN   Administration





  Bedtime Correctional Scale  


 


Insulin Human Lispro  0 units  10/28/20 10:45  11/04/20 10:00





  Humalog 300 Units/3 Ml Vial  SC   2 unit





  .MODERATE SLIDING SC PRN   Administration





  MODERATE SLIDING SCALE  





  Protocol  


 


Isosorbide Dinitrate  20 mg  10/22/20 21:00  11/04/20 08:41





  Isosorbide Dinitrate 20 Mg Tab  PO   20 mg





  BID SAIDA   Administration


 


Labetalol HCl  10 mg  10/12/20 14:18  11/03/20 10:04





  Labetalol Hcl 100 Mg/20 Ml Vial  SLOW IVP   10 mg





  Q4H PRN   Administration





  SBP Greater Than 180  


 


Lactulose  30 gm  10/29/20 09:00  11/04/20 09:09





  Lactulose 20 Gm/30 Ml Udcup  PO   Not Given





  Q2D [facility]  


 


Levothyroxine Sodium  25 mcg  10/17/20 06:00  11/04/20 05:55





  Levothyroxine Sodium 25 Mcg Tab  PO   25 mcg





  0600 SAIDA   Administration


 


Lorazepam  2 mg  11/02/20 14:53  11/02/20 22:44





  Lorazepam 2 Mg/Ml Vial  SLOW IVP  12/02/20 14:54  2 mg





  Q1H PRN   Administration





  Breakthrough agitation  


 


Methylprednisolone Sodium Succinate  40 mg  10/17/20 21:00  11/04/20 08:43





  Methylprednisolone Sod Succ 40 Mg Vial  IVP   40 mg





  Q12HR SAIDA   Administration


 


Pantoprazole Sodium  40 mg  10/27/20 09:00  11/04/20 08:43





  Pantoprazole 40 Mg Vial  IVP   40 mg





  DAILY SAIDA   Administration


 


Polyvinyl Alcohol/Povidone  1 each  10/13/20 09:00  11/04/20 08:43





  Polyvinyl Alcohol 1.4%/Povidone 0.6% Opth Drops  EA EYE   1 each





  DAILY SAIDA   Administration


 


Propylene Glycol  1 drop  10/13/20 09:00  11/04/20 08:44





  Polyethylene Glycol Opth Drop 15 Ml Bot  EA EYE   1 each





  BID SAIDA   Administration


 


Scopolamine  1.5 mg  10/25/20 11:00  11/03/20 10:08





  Scopolamine 1.5 Mg/72 Hour Patch  TD   1.5 mg





  Q3D SAIDA   Administration


 


Simvastatin  10 mg  10/19/20 21:00  11/03/20 20:12





  Simvastatin 10 Mg Tab  PO   10 mg





  2100 SAIDA   Administration


 


Sodium Chloride  10 ml  10/13/20 09:00  11/04/20 09:10





  Flush - Normal Saline 10 Ml Syringe  IVF   10 ml





  Q12HR SAIDA   Administration


 


Sodium Polystyrene Sulfonate  30 gm  10/29/20 09:00  11/04/20 09:11





  Sodium Polystyrene Sulfonate 15 Gm/60 Ml Bot  PO   Not Given





  Q2D SAIDA  


 


Sterile Water  1 ml  10/17/20 11:00  11/04/20 08:43





  Bacteriostatic Water 30 Ml Vial  FS   1 ml





  PRN PRN   Administration





  RECONSTITUTION  


 


Throat Lozenges  1 gloria  10/18/20 23:28  10/19/20 00:13





  Cepastat Lozenges 1 Gloria  PO   1 gloria





  Q2H PRN   Administration





  Sore Throat  


 


Vecuronium Bromide  10 mg  10/23/20 11:13  11/02/20 22:45





  Vecuronium 10 Mg Vial  IV   10 mg





  Q1H PRN   Administration





   SEDATION  


 


Zinc Sulfate  220 mg  10/16/20 09:00  11/04/20 08:42





  Zinc Sulfate 220 Mg Cap  PO   220 mg





  DAILY SAIDA   Administration














- Exam


General Appearance: ill appearing


Eye: PERRL, anicteric sclera


ENT: no oropharyngeal lesions, moist mucosa


Neck: supple, no JVD


Heart: RRR, no murmur


Respiratory: no wheezes, no rales, rhonchi


Gastrointestinal: soft, non-tender, non-distended, normal bowel sounds


Extremities: no cyanosis, 2+ LE edema


Neurological: cranial nerve grossly intact, no focal deficits





Hosp A/P


(1) Acute respiratory failure with hypoxia


Code(s): J96.01 - ACUTE RESPIRATORY FAILURE WITH HYPOXIA   Status: Acute   





(2) Metabolic acidosis


Code(s): E87.2 - ACIDOSIS   Status: Resolved   





(3) Pneumonia due to COVID-19 virus


Code(s): U07.1 - COVID-19; J12.89 - OTHER VIRAL PNEUMONIA   Status: Acute   





(4) Acute worsening of stage 3 chronic kidney disease


Code(s): N18.3 - CHRONIC KIDNEY DISEASE, STAGE 3 (MODERATE) * DO NOT USE *   

Status: Acute   





(5) Anemia, normocytic normochromic


Code(s): D64.9 - ANEMIA, UNSPECIFIED   Status: Chronic   





(6) Chronic diastolic heart failure


Code(s): I50.32 - CHRONIC DIASTOLIC (CONGESTIVE) HEART FAILURE   Status: Chronic

  





(7) Diabetic nephropathy


Status: Chronic   


Qualifiers: 


   Diabetes mellitus type: type 2   Qualified Code(s): E11.21 - Type 2 diabetes 

mellitus with diabetic nephropathy   





(8) Hyperlipidemia


Code(s): E78.5 - HYPERLIPIDEMIA, UNSPECIFIED   Status: Chronic   


Qualifiers: 


   Hyperlipidemia type: unspecified   Qualified Code(s): E78.5 - Hyperlipidemia,

unspecified   





(9) Hypertension


Code(s): I10 - ESSENTIAL (PRIMARY) HYPERTENSION   Status: Chronic   


Qualifiers: 


   Hypertension type: essential hypertension   Qualified Code(s): I10 - 

Essential (primary) hypertension   





(10) Moderate aortic regurgitation


Code(s): I35.1 - NONRHEUMATIC AORTIC (VALVE) INSUFFICIENCY   Status: Chronic   





(11) Thrombocytopenia


Code(s): D69.6 - THROMBOCYTOPENIA, UNSPECIFIED   Status: Acute   





(12) DM type 2 (diabetes mellitus, type 2)


Status: Chronic   


Qualifiers: 


   Diabetes mellitus long term insulin use: with long term use   Diabetes 

mellitus complication status: with kidney complications   Diabetes mellitus 

complication detail: with chronic kidney disease   Chronic kidney disease stage:

stage 3 (moderate) 





- Plan





recieved convalescent plasma on 10/13, currently on solumedrol 40mg q12h


weaning  per pulm adv, is on slow taper of fio2 and peep, day #12 on vent


recieved 2 u prbc on 10/30, h/h stable


was initiated on HD on 10/29, has had back to back HD with volume removal, off 

dialysis from last 3 days, has progressive elevation of bun/cr and vol overload,

will have HD today.


continue norvasc, isordil, synthorid, zocor, fondaparinux (dvt prophylaxis)


was taken off lantus due to labile dm and lopressor due to bradycardia.


prognosis guarded


will likely need a central line, tunneled cath, trach and peg, await 's 

adv when its safe to do so.
- Subjective


Encounter Date: 11/05/20


Encounter Time: 10:40


Subjective: 


on vent, had chest tubes placed by  this morning





- Objective


Vital Signs & Weight: 


                             Vital Signs (12 hours)











  Temp Pulse Resp BP Pulse Ox


 


 11/05/20 12:00  99.0 F   26 H  


 


 11/05/20 11:31   101 H   


 


 11/05/20 10:00    26 H  


 


 11/05/20 09:04   101 H   181/72 H 


 


 11/05/20 08:59   101 H   130/52 L 


 


 11/05/20 08:58   100   130/52 L 


 


 11/05/20 08:39   101 H   130/52 L 


 


 11/05/20 08:00    29 H  


 


 11/05/20 07:26      96


 


 11/05/20 07:00  99.1 F    


 


 11/05/20 06:00    26 H  


 


 11/05/20 04:18   86   


 


 11/05/20 04:00  98.5 F   26 H  


 


 11/05/20 02:00    26 H  








                                     Weight











Admit Weight                   176 lb 8 oz


 


Weight                         192 lb 14.472 oz











                            Most Recent Monitor Data











Heart Rate from ECG            99


 


NIBP                           133/45


 


NIBP BP-Mean                   74


 


Respiration from ECG           28


 


SpO2                           93














I&O: 


                                        











 11/04/20 11/05/20 11/06/20





 06:59 06:59 06:59


 


Intake Total 1843 1705 120


 


Output Total 1235 2030 970


 


Balance 645 -938 -993











Result Diagrams: 


                                 11/05/20 04:00





                                 11/05/20 04:00


Additional Labs: 


                                   Accuchecks











  11/05/20 11/05/20 11/04/20





  09:52 04:23 22:07


 


POC Glucose  297 H  304 H  180 H














  11/04/20





  16:34


 


POC Glucose  192 H














Hospitalist ROS





- Medication


Medications: 


Active Medications











Generic Name Dose Route Start Last Admin





  Trade Name Freq  PRN Reason Stop Dose Admin


 


Acetaminophen  650 mg  10/12/20 14:11  10/17/20 22:18





  Acetaminophen 325 Mg Tab  PO   650 mg





  Q4H PRN   Administration





  Headache/Fever/Mild Pain (1-3)  


 


Amlodipine Besylate  5 mg  10/13/20 09:00  11/05/20 08:59





  Amlodipine 5 Mg Tab  PO   5 mg





  BID SAIDA   Administration


 


Ascorbic Acid  1,000 mg  10/15/20 21:00  11/05/20 08:59





  Ascorbic Acid 500 Mg Chewable Tablet  PO   1,000 mg





  BID SAIDA   Administration


 


Brimonidine Tartrate  1 drop  10/13/20 09:00  11/05/20 09:02





  Brimonidine Tartrate 0.2% Ophth Soln 5 Ml Bottle  R EYE   1 drop





  BID SAIDA   Administration


 


Calcitriol  0.25 mcg  10/13/20 09:00  11/05/20 08:58





  Calcitriol 0.25 Mcg Cap  PO   0.25 mcg





  DAILY SAIDA   Administration


 


Cholecalciferol  1,000 units  10/13/20 09:00  11/05/20 08:59





  Cholecalciferol 1,000 Units (25 Mcg) Tab  PO   1,000 units





  DAILY SAIDA   Administration


 


Dextrose/Water  25 gm  10/12/20 14:15  11/01/20 17:30





  Dextrose 50% Abboject 50 Ml Syringe  SLOW IVP   25 gm





  PRN PRN   Administration





  Hypoglycemia  


 


Diphenhydramine HCl  25 mg  10/14/20 04:24  10/20/20 20:11





  Diphenhydramine 25 Mg Cap  PO   25 mg





  HSPRN PRN   Administration





  Insomnia  


 


Dorzolamide/Timolol  1 drop  10/13/20 09:00  11/05/20 09:02





  Dorzolamide/Timolol 2%/0.5% Ophth Soln 10 Ml Bottle  EA EYE   1 each





  TID SAIDA   Administration


 


Fondaparinux  5 mg  11/04/20 06:00  11/05/20 06:16





  Fondaparinux Sodium 2.5 Mg/0.5 Ml Syringe  SC   5 mg





  DAILY@0600 SAIDA   Administration


 


Furosemide  80 mg  11/03/20 14:00  11/05/20 13:40





  Furosemide 100 Mg/10 Ml Vial  SLOW IVP   80 mg





  0600,1400 SAIDA   Administration


 


Guaifenesin  600 mg  11/03/20 13:00  11/05/20 12:39





  Diabetic Tussin 200 Mg/10 Ml Udcup  PER TUBE   600 mg





  Q4HR SAIDA   Administration


 


Hydralazine HCl  50 mg  10/13/20 09:00  11/05/20 08:58





  Hydralazine 25 Mg Tab  PO   50 mg





  TID SAIDA   Administration


 


Dextrose/Water  1,000 mls @ 0 mls/hr  10/12/20 14:15  11/02/20 02:43





  D5w  IV   1,000 mls





  .Q0M PRN   Administration





  Hypoglycemia  





  As Directed  


 


Dextrose/Water  1,000 mls @ 30 mls/hr  11/02/20 07:30  11/05/20 09:13





  Dextrose 10% In Water  IV   Not Given





  .Q24H SAIDA  


 


Nicardipine HCl 50 mg/ Sodium  40 mg in 250 mls @ 0 mls/hr  11/03/20 13:30  

11/04/20 10:29





  Chloride  IVPB   250 mls





  INF SAIDA   Administration





  Protocol  





  Titrate  


 


Insulin Human Lispro  0 units  10/12/20 21:21  11/05/20 10:01





  Humalog 300 Units/3 Ml Vial  SC   3 unit





  .BEDTIME SLIDING SC PRN   Administration





  Bedtime Correctional Scale  


 


Insulin Human Lispro  0 units  10/28/20 10:45  11/05/20 04:25





  Humalog 300 Units/3 Ml Vial  SC   8 unit





  .MODERATE SLIDING SC PRN   Administration





  MODERATE SLIDING SCALE  





  Protocol  


 


Isosorbide Dinitrate  20 mg  10/22/20 21:00  11/05/20 08:59





  Isosorbide Dinitrate 20 Mg Tab  PO   20 mg





  BID SAIDA   Administration


 


Labetalol HCl  10 mg  10/12/20 14:18  11/05/20 09:04





  Labetalol Hcl 100 Mg/20 Ml Vial  SLOW IVP   10 mg





  Q4H PRN   Administration





  SBP Greater Than 180  


 


Lactulose  30 gm  10/29/20 09:00  11/04/20 09:09





  Lactulose 20 Gm/30 Ml Udcup  PO   Not Given





  Q2D SAIDA  


 


Levothyroxine Sodium  25 mcg  10/17/20 06:00  11/05/20 05:43





  Levothyroxine Sodium 25 Mcg Tab  PO   25 mcg





  0600 SAIDA   Administration


 


Lorazepam  2 mg  11/02/20 14:53  11/04/20 18:15





  Lorazepam 2 Mg/Ml Vial  SLOW IVP  12/02/20 14:54  2 mg





  Q1H PRN   Administration





  Breakthrough agitation  


 


Methylprednisolone Sodium Succinate  40 mg  10/17/20 21:00  11/05/20 09:00





  Methylprednisolone Sod Succ 40 Mg Vial  IVP   40 mg





  Q12HR SAIDA   Administration


 


Pantoprazole Sodium  40 mg  10/27/20 09:00  11/05/20 09:03





  Pantoprazole 40 Mg Vial  IVP   40 mg





  DAILY SAIDA   Administration


 


Polyvinyl Alcohol/Povidone  1 each  10/13/20 09:00  11/05/20 09:03





  Polyvinyl Alcohol 1.4%/Povidone 0.6% Opth Drops  EA EYE   1 each





  DAILY SAIDA   Administration


 


Propylene Glycol  1 drop  10/13/20 09:00  11/05/20 09:02





  Polyethylene Glycol Opth Drop 15 Ml Bot  EA EYE   1 each





  BID SAIDA   Administration


 


Scopolamine  1.5 mg  10/25/20 11:00  11/03/20 10:08





  Scopolamine 1.5 Mg/72 Hour Patch  TD   1.5 mg





  Q3D SAIDA   Administration


 


Simvastatin  10 mg  10/19/20 21:00  11/04/20 20:03





  Simvastatin 10 Mg Tab  PO   10 mg





  2100 SAIDA   Administration


 


Sodium Chloride  10 ml  10/13/20 09:00  11/05/20 09:13





  Flush - Normal Saline 10 Ml Syringe  IVF   10 ml





  Q12HR SAIDA   Administration


 


Sodium Polystyrene Sulfonate  30 gm  10/29/20 09:00  11/04/20 09:11





  Sodium Polystyrene Sulfonate 15 Gm/60 Ml Bot  PO   Not Given





  Q2D SAIDA  


 


Sterile Water  1 ml  10/17/20 11:00  11/04/20 08:43





  Bacteriostatic Water 30 Ml Vial  FS   1 ml





  PRN PRN   Administration





  RECONSTITUTION  


 


Vecuronium Bromide  10 mg  10/23/20 11:13  11/04/20 13:54





  Vecuronium 10 Mg Vial  IV   10 mg





  Q1H PRN   Administration





   SEDATION  


 


Zinc Sulfate  220 mg  10/16/20 09:00  11/05/20 08:58





  Zinc Sulfate 220 Mg Cap  PO   220 mg





  DAILY SAIDA   Administration














- Exam


General Appearance: ill appearing


Eye: PERRL, anicteric sclera


ENT: no oropharyngeal lesions, dry oral mucosa


Neck: supple, no JVD


Heart: RRR, no murmur


Respiratory: no wheezes, no rales


Gastrointestinal: soft, non-tender, non-distended, normal bowel sounds


Extremities: no cyanosis, 1+ LE edema


Neurological: cranial nerve grossly intact, no focal deficits





Hosp A/P


(1) Acute respiratory failure with hypoxia


Code(s): J96.01 - ACUTE RESPIRATORY FAILURE WITH HYPOXIA   Status: Acute   





(2) Metabolic acidosis


Code(s): E87.2 - ACIDOSIS   Status: Resolved   





(3) Pneumonia due to COVID-19 virus


Code(s): U07.1 - COVID-19; J12.89 - OTHER VIRAL PNEUMONIA   Status: Acute   





(4) Acute worsening of stage 3 chronic kidney disease


Code(s): N18.3 - CHRONIC KIDNEY DISEASE, STAGE 3 (MODERATE) * DO NOT USE *   

Status: Acute   





(5) Anemia, normocytic normochromic


Code(s): D64.9 - ANEMIA, UNSPECIFIED   Status: Chronic   





(6) Chronic diastolic heart failure


Code(s): I50.32 - CHRONIC DIASTOLIC (CONGESTIVE) HEART FAILURE   Status: Chronic

  





(7) Diabetic nephropathy


Status: Chronic   


Qualifiers: 


   Diabetes mellitus type: type 2   Qualified Code(s): E11.21 - Type 2 diabetes 

mellitus with diabetic nephropathy   





(8) Hyperlipidemia


Code(s): E78.5 - HYPERLIPIDEMIA, UNSPECIFIED   Status: Chronic   


Qualifiers: 


   Hyperlipidemia type: unspecified   Qualified Code(s): E78.5 - Hyperlipidemia,

unspecified   





(9) Hypertension


Code(s): I10 - ESSENTIAL (PRIMARY) HYPERTENSION   Status: Chronic   


Qualifiers: 


   Hypertension type: essential hypertension   Qualified Code(s): I10 - 

Essential (primary) hypertension   





(10) Moderate aortic regurgitation


Code(s): I35.1 - NONRHEUMATIC AORTIC (VALVE) INSUFFICIENCY   Status: Chronic   





(11) Thrombocytopenia


Code(s): D69.6 - THROMBOCYTOPENIA, UNSPECIFIED   Status: Acute   





(12) DM type 2 (diabetes mellitus, type 2)


Status: Chronic   


Qualifiers: 


   Diabetes mellitus long term insulin use: with long term use   Diabetes 

mellitus complication status: with kidney complications   Diabetes mellitus 

complication detail: with chronic kidney disease   Chronic kidney disease stage:

stage 3 (moderate) 





- Plan





had chest tubes placed for b/l pneumothorax, has subcut emphysema


recieved convalescent plasma on 10/13, currently on solumedrol 40mg q12h


weaning  per pulm adv, day #13 on vent


recieved 2 u prbc on 10/30, h/h stable


was initiated on HD on 10/29, lasix q12h.


continue norvasc, isordil, synthorid, zocor, fondaparinux (dvt prophylaxis)


was taken off lantus due to labile dm and lopressor due to bradycardia.


prognosis guarded


will likely need a central line, tunneled cath, trach and peg, await 's 

adv when its safe to do so.
- Subjective


Encounter Date: 11/06/20


Encounter Time: 11:15


Subjective: 


is on vent, off sedation, does not interact or move extemities








- Objective


Vital Signs & Weight: 


                             Vital Signs (12 hours)











  Temp Pulse Resp BP Pulse Ox


 


 11/06/20 14:00    28 H  


 


 11/06/20 12:00  98.3 F   30 H  


 


 11/06/20 11:06   103 H   143/53 H 


 


 11/06/20 10:00    31 H  


 


 11/06/20 08:57   112 H   124/53 L 


 


 11/06/20 08:56   112 H   124/53 L 


 


 11/06/20 08:13   112 H   124/53 L 


 


 11/06/20 08:00  98.8 F   32 H   98


 


 11/06/20 06:00    29 H  


 


 11/06/20 04:00  98 F   29 H  








                                     Weight











Admit Weight                   176 lb 8 oz


 


Weight                         197 lb 12.074 oz











                            Most Recent Monitor Data











Heart Rate from ECG            91


 


NIBP                           85/57


 


NIBP BP-Mean                   66


 


Respiration from ECG           21


 


SpO2                           100














I&O: 


                                        











 11/05/20 11/06/20 11/07/20





 06:59 06:59 06:59


 


Intake Total 1705 1006 


 


Output Total 2030 2745 375


 


Balance -325 -1739 -375











Result Diagrams: 


                                 11/06/20 05:00





                                 11/06/20 03:30


Additional Labs: 


                                   Accuchecks











  11/06/20 11/05/20 11/05/20





  10:26 23:07 15:50


 


POC Glucose  274 H  266 H  217 H














Hospitalist ROS





- Medication


Medications: 


Active Medications











Generic Name Dose Route Start Last Admin





  Trade Name Freq  PRN Reason Stop Dose Admin


 


Acetaminophen  650 mg  10/12/20 14:11  10/17/20 22:18





  Acetaminophen 325 Mg Tab  PO   650 mg





  Q4H PRN   Administration





  Headache/Fever/Mild Pain (1-3)  


 


Amlodipine Besylate  5 mg  10/13/20 09:00  11/06/20 08:56





  Amlodipine 5 Mg Tab  PO   5 mg





  BID SAIDA   Administration


 


Ascorbic Acid  1,000 mg  10/15/20 21:00  11/06/20 08:56





  Ascorbic Acid 500 Mg Chewable Tablet  PO   1,000 mg





  BID SAIDA   Administration


 


Brimonidine Tartrate  1 drop  10/13/20 09:00  11/06/20 09:52





  Brimonidine Tartrate 0.2% Ophth Soln 5 Ml Bottle  R EYE   1 drop





  BID SAIDA   Administration


 


Calcitriol  0.25 mcg  10/13/20 09:00  11/06/20 08:57





  Calcitriol 0.25 Mcg Cap  PO   0.25 mcg





  DAILY SAIDA   Administration


 


Cholecalciferol  1,000 units  10/13/20 09:00  11/06/20 08:57





  Cholecalciferol 1,000 Units (25 Mcg) Tab  PO   1,000 units





  DAILY SAIDA   Administration


 


Dextrose/Water  25 gm  10/12/20 14:15  11/01/20 17:30





  Dextrose 50% Abboject 50 Ml Syringe  SLOW IVP   25 gm





  PRN PRN   Administration





  Hypoglycemia  


 


Diphenhydramine HCl  25 mg  10/14/20 04:24  10/20/20 20:11





  Diphenhydramine 25 Mg Cap  PO   25 mg





  HSPRN PRN   Administration





  Insomnia  


 


Dorzolamide/Timolol  1 drop  10/13/20 09:00  11/06/20 09:52





  Dorzolamide/Timolol 2%/0.5% Ophth Soln 10 Ml Bottle  EA EYE   1 each





  TID SAIDA   Administration


 


Fondaparinux  5 mg  11/04/20 06:00  11/06/20 05:17





  Fondaparinux Sodium 2.5 Mg/0.5 Ml Syringe  SC   5 mg





  DAILY@0600 SAIDA   Administration


 


Furosemide  80 mg  11/03/20 14:00  11/06/20 13:32





  Furosemide 100 Mg/10 Ml Vial  SLOW IVP   80 mg





  0600,1400 SAIDA   Administration


 


Guaifenesin  600 mg  11/03/20 13:00  11/06/20 13:32





  Diabetic Tussin 200 Mg/10 Ml Udcup  PER TUBE   600 mg





  Q4HR SAIDA   Administration


 


Hydralazine HCl  50 mg  10/13/20 09:00  11/06/20 08:57





  Hydralazine 25 Mg Tab  PO   50 mg





  TID SAIDA   Administration


 


Dextrose/Water  1,000 mls @ 0 mls/hr  10/12/20 14:15  11/02/20 02:43





  D5w  IV   1,000 mls





  .Q0M PRN   Administration





  Hypoglycemia  





  As Directed  


 


Nicardipine HCl 50 mg/ Sodium  40 mg in 250 mls @ 0 mls/hr  11/03/20 13:30  11 /04/20 10:29





  Chloride  IVPB   250 mls





  INF SAIDA   Administration





  Protocol  





  Titrate  


 


Insulin Human Lispro  0 units  10/12/20 21:21  11/06/20 05:00





  Humalog 300 Units/3 Ml Vial  SC   3 unit





  .BEDTIME SLIDING SC PRN   Administration





  Bedtime Correctional Scale  


 


Insulin Human Lispro  0 units  10/28/20 10:45  11/06/20 10:29





  Humalog 300 Units/3 Ml Vial  SC   6 unit





  .MODERATE SLIDING SC PRN   Administration





  MODERATE SLIDING SCALE  





  Protocol  


 


Isosorbide Dinitrate  20 mg  10/22/20 21:00  11/06/20 08:56





  Isosorbide Dinitrate 20 Mg Tab  PO   20 mg





  BID SAIDA   Administration


 


Labetalol HCl  10 mg  10/12/20 14:18  11/05/20 19:18





  Labetalol Hcl 100 Mg/20 Ml Vial  SLOW IVP   10 mg





  Q4H PRN   Administration





  SBP Greater Than 180  


 


Lactulose  30 gm  10/29/20 09:00  11/06/20 10:29





  Lactulose 20 Gm/30 Ml Udcup  PO   Not Given





  Q2D SAIDA  


 


Levothyroxine Sodium  25 mcg  10/17/20 06:00  11/06/20 05:16





  Levothyroxine Sodium 25 Mcg Tab  PO   25 mcg





  0600 SAIDA   Administration


 


Lorazepam  2 mg  11/02/20 14:53  11/06/20 00:41





  Lorazepam 2 Mg/Ml Vial  SLOW IVP  12/02/20 14:54  2 mg





  Q1H PRN   Administration





  Breakthrough agitation  


 


Methylprednisolone Sodium Succinate  40 mg  10/17/20 21:00  11/06/20 09:00





  Methylprednisolone Sod Succ 40 Mg Vial  IVP   40 mg





  Q12HR SAIDA   Administration


 


Pantoprazole Sodium  40 mg  10/27/20 09:00  11/06/20 09:00





  Pantoprazole 40 Mg Vial  IVP   40 mg





  DAILY SAIDA   Administration


 


Polyvinyl Alcohol/Povidone  1 each  10/13/20 09:00  11/06/20 09:58





  Polyvinyl Alcohol 1.4%/Povidone 0.6% Opth Drops  EA EYE   1 each





  DAILY SAIDA   Administration


 


Propylene Glycol  1 drop  10/13/20 09:00  11/06/20 09:53





  Polyethylene Glycol Opth Drop 15 Ml Bot  EA EYE   1 each





  BID SAIDA   Administration


 


Scopolamine  1.5 mg  10/25/20 11:00  11/06/20 13:32





  Scopolamine 1.5 Mg/72 Hour Patch  TD   1.5 mg





  Q3D SAIDA   Administration


 


Simvastatin  10 mg  10/19/20 21:00  11/05/20 20:05





  Simvastatin 10 Mg Tab  PO   10 mg





  2100 SAIDA   Administration


 


Sodium Chloride  10 ml  10/13/20 09:00  11/06/20 09:00





  Flush - Normal Saline 10 Ml Syringe  IVF   10 ml





  Q12HR SAIDA   Administration


 


Sodium Polystyrene Sulfonate  30 gm  10/29/20 09:00  11/06/20 10:29





  Sodium Polystyrene Sulfonate 15 Gm/60 Ml Bot  PO   Not Given





  Q2D SAIDA  


 


Sterile Water  1 ml  10/17/20 11:00  11/04/20 08:43





  Bacteriostatic Water 30 Ml Vial  FS   1 ml





  PRN PRN   Administration





  RECONSTITUTION  


 


Vecuronium Bromide  10 mg  10/23/20 11:13  11/04/20 13:54





  Vecuronium 10 Mg Vial  IV   10 mg





  Q1H PRN   Administration





   SEDATION  


 


Zinc Sulfate  220 mg  10/16/20 09:00  11/06/20 08:56





  Zinc Sulfate 220 Mg Cap  PO   220 mg





  DAILY SAIDA   Administration














- Exam


General Appearance: ill appearing


Eye: anicteric sclera


ENT: no oropharyngeal lesions, dry oral mucosa


Neck: supple, no JVD


Heart: RRR, no murmur


Respiratory: no wheezes, rales, rhonchi


Gastrointestinal: soft, non-tender, non-distended, normal bowel sounds


Extremities: no cyanosis, 1+ LE edema


Neurological: cranial nerve grossly intact, no focal deficits





Hosp A/P


(1) Acute respiratory failure with hypoxia


Code(s): J96.01 - ACUTE RESPIRATORY FAILURE WITH HYPOXIA   Status: Acute   





(2) Metabolic acidosis


Code(s): E87.2 - ACIDOSIS   Status: Resolved   





(3) Pneumonia due to COVID-19 virus


Code(s): U07.1 - COVID-19; J12.89 - OTHER VIRAL PNEUMONIA   Status: Acute   





(4) Acute worsening of stage 3 chronic kidney disease


Code(s): N18.3 - CHRONIC KIDNEY DISEASE, STAGE 3 (MODERATE) * DO NOT USE *   

Status: Acute   





(5) Anemia, normocytic normochromic


Code(s): D64.9 - ANEMIA, UNSPECIFIED   Status: Chronic   





(6) Chronic diastolic heart failure


Code(s): I50.32 - CHRONIC DIASTOLIC (CONGESTIVE) HEART FAILURE   Status: Chronic

  





(7) Diabetic nephropathy


Status: Chronic   


Qualifiers: 


   Diabetes mellitus type: type 2   Qualified Code(s): E11.21 - Type 2 diabetes 

mellitus with diabetic nephropathy   





(8) Hyperlipidemia


Code(s): E78.5 - HYPERLIPIDEMIA, UNSPECIFIED   Status: Chronic   


Qualifiers: 


   Hyperlipidemia type: unspecified   Qualified Code(s): E78.5 - Hyperlipidemia,

unspecified   





(9) Hypertension


Code(s): I10 - ESSENTIAL (PRIMARY) HYPERTENSION   Status: Chronic   


Qualifiers: 


   Hypertension type: essential hypertension   Qualified Code(s): I10 - 

Essential (primary) hypertension   





(10) Moderate aortic regurgitation


Code(s): I35.1 - NONRHEUMATIC AORTIC (VALVE) INSUFFICIENCY   Status: Chronic   





(11) Thrombocytopenia


Code(s): D69.6 - THROMBOCYTOPENIA, UNSPECIFIED   Status: Acute   





(12) DM type 2 (diabetes mellitus, type 2)


Status: Chronic   


Qualifiers: 


   Diabetes mellitus long term insulin use: with long term use   Diabetes 

mellitus complication status: with kidney complications   Diabetes mellitus 

complication detail: with chronic kidney disease   Chronic kidney disease stage:

stage 3 (moderate) 





- Plan





will get 2 u prbc with HD today


has chest tubes for b/l pneumothorax, also has subcut emphysema


recieved convalescent plasma on 10/13, currently on solumedrol 40mg q12h


weaning  per pulm adv, day #14 on vent


recieved 2 u prbc on 10/30, h/h stable


was initiated on HD on 10/29, lasix q12h.


continue norvasc, isordil, synthorid, zocor, fondaparinux (dvt prophylaxis)


was taken off lantus due to labile dm and lopressor due to bradycardia.


prognosis guarded


will likely need a central line, tunneled cath, trach and peg, await 's 

adv when its safe to do so.


discussed extensively with her children, I have shown them the admission chest 

xray and current one with severe covid changes, all relevant labs.


they are aware of poor prognosis, above discussion was held with palliative care

RN, lisa Ruvalcaba and staff RN in family conference room.


Even before I started to give daughter updates, she knew all of the procedures 

and current condition her mom was in.
- Subjective


Encounter Date: 11/14/20


Encounter Time: 11:00


Subjective: 


Patient still on vent.  She is off completely sedation.  Her blood pressure is 

on the high end.  She is afebrile.  On vent satting around 94%.  Plan for 

tentative PEG and trach placement today.





- Objective


Vital Signs & Weight: 


                             Vital Signs (12 hours)











  Temp Pulse Resp BP


 


 11/14/20 14:03   88   142/63 H


 


 11/14/20 10:45   88  


 


 11/14/20 09:56   86   125/62


 


 11/14/20 09:55   86   125/62


 


 11/14/20 07:53   86  


 


 11/14/20 06:00    27 H 


 


 11/14/20 04:38   80  


 


 11/14/20 04:00  98.8 F   26 H 








                                     Weight











Admit Weight                   176 lb 8 oz


 


Weight                         177 lb 7.554 oz











                            Most Recent Monitor Data











Heart Rate from ECG            82


 


NIBP                           193/74


 


NIBP BP-Mean                   113


 


Respiration from ECG           28


 


SpO2                           94














I&O: 


                                        











 11/13/20 11/14/20 11/15/20





 06:59 06:59 06:59


 


Intake Total 1134 1488 


 


Output Total 842 998 


 


Balance 292 490 











Result Diagrams: 


                                 11/14/20 04:00





                                 11/14/20 04:00


Additional Labs: 


                                   Accuchecks











  11/14/20 11/13/20 11/13/20





  12:35 21:20 17:02


 


POC Glucose  131 H  143 H  119 H














Hospitalist ROS





- Medication


Medications: 


Active Medications











Generic Name Dose Route Start Last Admin





  Trade Name Freq  PRN Reason Stop Dose Admin


 


Acetaminophen  650 mg  10/12/20 14:11  10/17/20 22:18





  Acetaminophen 325 Mg Tab  PO   650 mg





  Q4H PRN   Administration





  Headache/Fever/Mild Pain (1-3)  


 


Amlodipine Besylate  5 mg  10/13/20 09:00  11/14/20 09:55





  Amlodipine 5 Mg Tab  PO   5 mg





  BID SAIDA   Administration


 


Ascorbic Acid  1,000 mg  10/15/20 21:00  11/14/20 10:03





  Ascorbic Acid 500 Mg Chewable Tablet  PO   1,000 mg





  BID SAIDA   Administration


 


Brimonidine Tartrate  1 drop  10/13/20 09:00  11/14/20 12:58





  Brimonidine Tartrate 0.2% Ophth Soln 5 Ml Bottle  R EYE   1 drop





  BID SAIDA   Administration


 


Calcitriol  0.25 mcg  10/13/20 09:00  11/14/20 10:01





  Calcitriol 0.25 Mcg Cap  PO   0.25 mcg





  DAILY SAIDA   Administration


 


Cholecalciferol  1,000 units  10/13/20 09:00  11/14/20 10:14





  Cholecalciferol 1,000 Units (25 Mcg) Tab  PO   1,000 units





  DAILY SAIDA   Administration


 


Dextrose/Water  25 gm  10/12/20 14:15  11/01/20 17:30





  Dextrose 50% Abboject 50 Ml Syringe  SLOW IVP   25 gm





  PRN PRN   Administration





  Hypoglycemia  


 


Diphenhydramine HCl  25 mg  10/14/20 04:24  11/10/20 12:21





  Diphenhydramine 25 Mg Cap  PO   25 mg





  HSPRN PRN   Administration





  Insomnia  


 


Dorzolamide/Timolol  1 drop  10/13/20 09:00  11/14/20 10:18





  Dorzolamide/Timolol 2%/0.5% Ophth Soln 10 Ml Bottle  EA EYE   1 each





  TID SAIDA   Administration


 


Guaifenesin  600 mg  11/03/20 13:00  11/14/20 13:57





  Diabetic Tussin 200 Mg/10 Ml Udcup  PER TUBE   600 mg





  Q4HR SAIDA   Administration


 


Hydralazine HCl  75 mg  11/13/20 21:00  11/14/20 09:56





  Hydralazine 25 Mg Tab  PO   75 mg





  TID SAIDA   Administration


 


Dextrose/Water  1,000 mls @ 0 mls/hr  10/12/20 14:15  11/02/20 02:43





  D5w  IV   1,000 mls





  .Q0M PRN   Administration





  Hypoglycemia  





  As Directed  


 


Nicardipine HCl 50 mg/ Sodium  40 mg in 250 mls @ 0 mls/hr  11/03/20 13:30  

11/14/20 07:01





  Chloride  IVPB   250 mls





  INF SAIDA   Administration





  Protocol  





  Titrate  


 


Insulin Glargine 15 units/  0.15 mls @ 0 mls/hr  11/07/20 21:00  11/13/20 21:23





  Miscellaneous Medication  SC   0.15 mls





  HS SAIDA   Administration


 


Insulin Human Lispro  0 units  10/12/20 21:21  11/08/20 21:10





  Humalog 300 Units/3 Ml Vial  SC   2 unit





  .BEDTIME SLIDING SC PRN   Administration





  Bedtime Correctional Scale  


 


Insulin Human Lispro  0 units  10/28/20 10:45  11/13/20 12:32





  Humalog 300 Units/3 Ml Vial  SC   2 unit





  .MODERATE SLIDING SC PRN   Administration





  MODERATE SLIDING SCALE  





  Protocol  


 


Isosorbide Dinitrate  20 mg  10/22/20 21:00  11/14/20 10:14





  Isosorbide Dinitrate 20 Mg Tab  PO   20 mg





  BID SAIDA   Administration


 


Labetalol HCl  10 mg  10/12/20 14:18  11/12/20 03:05





  Labetalol Hcl 100 Mg/20 Ml Vial  SLOW IVP   10 mg





  Q4H PRN   Administration





  SBP Greater Than 180  


 


Levothyroxine Sodium  25 mcg  10/17/20 06:00  11/14/20 06:08





  Levothyroxine Sodium 25 Mcg Tab  PO   25 mcg





  0600 SAIDA   Administration


 


Methylprednisolone Sodium Succinate  40 mg  10/17/20 21:00  11/14/20 10:03





  Methylprednisolone Sod Succ 40 Mg Vial  IVP   40 mg





  Q12HR SAIDA   Administration


 


Metoprolol Tartrate  75 mg  11/13/20 21:00  11/14/20 10:02





  Metoprolol Tartrate 50 Mg Tab  PO   75 mg





  BID SAIDA   Administration


 


Pantoprazole Sodium  40 mg  10/27/20 09:00  11/14/20 10:04





  Pantoprazole 40 Mg Vial  IVP   40 mg





  DAILY SAIDA   Administration


 


Propylene Glycol  1 drop  10/13/20 09:00  11/14/20 13:10





  Polyethylene Glycol Opth Drop 15 Ml Bot  EA EYE   1 each





  BID SAIDA   Administration


 


Scopolamine  1.5 mg  10/25/20 11:00  11/12/20 11:14





  Scopolamine 1.5 Mg/72 Hour Patch  TD   1.5 mg





  Q3D SAIDA   Administration


 


Simvastatin  10 mg  10/19/20 21:00  11/13/20 21:24





  Simvastatin 10 Mg Tab  PO   10 mg





  2100 SAIDA   Administration


 


Sodium Chloride  10 ml  10/13/20 09:00  11/14/20 10:04





  Flush - Normal Saline 10 Ml Syringe  IVF   10 ml





  Q12HR SAIDA   Administration


 


Sodium Polystyrene Sulfonate  30 gm  10/29/20 09:00  11/14/20 09:00





  Sodium Polystyrene Sulfonate 15 Gm/60 Ml Bot  PO   Not Given





  Q2D SAIDA  


 


Sterile Water  1 ml  10/17/20 11:00  11/10/20 09:48





  Bacteriostatic Water 30 Ml Vial  FS   1 ml





  PRN PRN   Administration





  RECONSTITUTION  


 


Zinc Sulfate  220 mg  10/16/20 09:00  11/14/20 10:03





  Zinc Sulfate 220 Mg Cap  PO   220 mg





  DAILY SAIDA   Administration














- Exam


General - other findings: On vent and off sedation.


Eye: PERRL


ENT: normocephalic atraumatic


Neck: supple


Psychiatric: A&O x 3





Hosp A/P





- Plan








Acute hypoxic respiratory failure secondary to COVID-19


--intubated since 10/23. Trach and Peg placement plans for today


--cont supportive cares. Family consider withdraw care if no improvement after 

trach for a week. 





COVID-19 PNA


--mgt above. s/p covalescent plasma. cont steroid. Isolation was discontinued. 





Acute toxic encephalopathy 


--remains unresponsive even after sedations weaned off


--EEG no evidence of seizure. likely related to COVID-19


--CT head negative 





Bilateral pneumothoraces


--s/p devin chest tube placement, mgt as per surgery 





RAMONA on CKD stage III


--D since 10/29. 


- Dr. Mcnamara follows





Anemia of chronic kidney disease


--s/p transfusions. 





Metabolic Acidosis


--resolved 





Diabetes type 2


--BG elevated d/t steroid. Incr Lantus, cont ISS





HTN


--cont Metoprolol/hydralazine/Norvasc. 





Hypothyroidism


--cont Levothyroxine 





Klebsiella UTI, poa


--completed abx 





DVT ppx: Arixtra


GI ppx: PPI


Code Status: DNR





Persistent respiratory failure requiring intubation for almost 21 days.


Neurologically she remained encephalopathic.


Prognosis remains guarded.
- Subjective


Encounter Date: 11/15/20


Encounter Time: 10:45


Subjective: 


Patient getting dialysis.  Vent dependent.   repeat chest x-ray this morning 

shows improved on the left side pneumothorax.





- Objective


Vital Signs & Weight: 


                             Vital Signs (12 hours)











  Temp Pulse Resp BP


 


 11/15/20 10:32   74  


 


 11/15/20 10:00    31 H 


 


 11/15/20 09:26   87   171/56 H


 


 11/15/20 09:22   89  


 


 11/15/20 07:52   89  


 


 11/15/20 06:00    32 H 


 


 11/15/20 04:00  98.7 F   33 H 


 


 11/15/20 03:38   79  








                                     Weight











Admit Weight                   176 lb 8 oz


 


Weight                         171 lb 1.259 oz











                            Most Recent Monitor Data











Heart Rate from ECG            75


 


NIBP                           128/53


 


NIBP BP-Mean                   78


 


Respiration from ECG           30


 


SpO2                           87














I&O: 


                                        











 11/14/20 11/15/20 11/16/20





 06:59 06:59 06:59


 


Intake Total 1488 844.2 


 


Output Total 998 714 22


 


Balance 490 130.2 -22











Result Diagrams: 


                                 11/15/20 04:00





                                 11/15/20 04:00


Additional Labs: 


                                   Accuchecks











  11/15/20 11/15/20 11/14/20





  11:11 04:01 23:08


 


POC Glucose  107 H  194 H  205 H














Hospitalist ROS





- Medication


Medications: 


Active Medications











Generic Name Dose Route Start Last Admin





  Trade Name Freq  PRN Reason Stop Dose Admin


 


Acetaminophen  650 mg  10/12/20 14:11  10/17/20 22:18





  Acetaminophen 325 Mg Tab  PO   650 mg





  Q4H PRN   Administration





  Headache/Fever/Mild Pain (1-3)  


 


Amlodipine Besylate  5 mg  10/13/20 09:00  11/15/20 09:26





  Amlodipine 5 Mg Tab  PO   5 mg





  BID SAIDA   Administration


 


Ascorbic Acid  1,000 mg  10/15/20 21:00  11/15/20 09:25





  Ascorbic Acid 500 Mg Chewable Tablet  PO   1,000 mg





  BID SAIDA   Administration


 


Brimonidine Tartrate  1 drop  10/13/20 09:00  11/15/20 09:13





  Brimonidine Tartrate 0.2% Ophth Soln 5 Ml Bottle  R EYE   1 drop





  BID SAIDA   Administration


 


Calcitriol  0.25 mcg  10/13/20 09:00  11/15/20 09:20





  Calcitriol 0.25 Mcg Cap  PO   0.25 mcg





  DAILY SAIDA   Administration


 


Cholecalciferol  1,000 units  10/13/20 09:00  11/15/20 09:49





  Cholecalciferol 1,000 Units (25 Mcg) Tab  PO   1,000 units





  DAILY SAIDA   Administration


 


Dextrose/Water  25 gm  10/12/20 14:15  11/14/20 22:20





  Dextrose 50% Abboject 50 Ml Syringe  SLOW IVP   25 gm





  PRN PRN   Administration





  Hypoglycemia  


 


Diphenhydramine HCl  25 mg  10/14/20 04:24  11/10/20 12:21





  Diphenhydramine 25 Mg Cap  PO   25 mg





  HSPRN PRN   Administration





  Insomnia  


 


Dorzolamide/Timolol  1 drop  10/13/20 09:00  11/15/20 09:42





  Dorzolamide/Timolol 2%/0.5% Ophth Soln 10 Ml Bottle  EA EYE   1 each





  TID SAIDA   Administration


 


Guaifenesin  600 mg  11/03/20 13:00  11/15/20 09:49





  Diabetic Tussin 200 Mg/10 Ml Udcup  PER TUBE   600 mg





  Q4HR SAIDA   Administration


 


Hydralazine HCl  75 mg  11/13/20 21:00  11/15/20 09:22





  Hydralazine 25 Mg Tab  PO   75 mg





  TID SAIDA   Administration


 


Dextrose/Water  1,000 mls @ 0 mls/hr  10/12/20 14:15  11/02/20 02:43





  D5w  IV   1,000 mls





  .Q0M PRN   Administration





  Hypoglycemia  





  As Directed  


 


Nicardipine HCl 50 mg/ Sodium  40 mg in 250 mls @ 0 mls/hr  11/03/20 13:30  

11/14/20 07:01





  Chloride  IVPB   250 mls





  INF SAIDA   Administration





  Protocol  





  Titrate  


 


Insulin Glargine 15 units/  0.15 mls @ 0 mls/hr  11/07/20 21:00  11/14/20 22:20





  Miscellaneous Medication  SC   Not Given





  [facility]  


 


Insulin Human Lispro  0 units  10/12/20 21:21  11/08/20 21:10





  Humalog 300 Units/3 Ml Vial  SC   2 unit





  .BEDTIME SLIDING SC PRN   Administration





  Bedtime Correctional Scale  


 


Insulin Human Lispro  0 units  10/28/20 10:45  11/15/20 05:28





  Humalog 300 Units/3 Ml Vial  SC   2 unit





  .MODERATE SLIDING SC PRN   Administration





  MODERATE SLIDING SCALE  





  Protocol  


 


Isosorbide Dinitrate  20 mg  10/22/20 21:00  11/15/20 09:49





  Isosorbide Dinitrate 20 Mg Tab  PO   20 mg





  BID SAIDA   Administration


 


Labetalol HCl  10 mg  10/12/20 14:18  11/12/20 03:05





  Labetalol Hcl 100 Mg/20 Ml Vial  SLOW IVP   10 mg





  Q4H PRN   Administration





  SBP Greater Than 180  


 


Levothyroxine Sodium  25 mcg  10/17/20 06:00  11/15/20 05:27





  Levothyroxine Sodium 25 Mcg Tab  PO   25 mcg





  0600 SAIDA   Administration


 


Methylprednisolone Sodium Succinate  40 mg  10/17/20 21:00  11/15/20 09:42





  Methylprednisolone Sod Succ 40 Mg Vial  IVP   40 mg





  Q12HR SAIDA   Administration


 


Metoprolol Tartrate  75 mg  11/13/20 21:00  11/15/20 09:24





  Metoprolol Tartrate 50 Mg Tab  PO   75 mg





  BID SAIDA   Administration


 


Pantoprazole Sodium  40 mg  10/27/20 09:00  11/15/20 09:16





  Pantoprazole 40 Mg Vial  IVP   40 mg





  DAILY SAIDA   Administration


 


Propylene Glycol  1 drop  10/13/20 09:00  11/15/20 09:14





  Polyethylene Glycol Opth Drop 15 Ml Bot  EA EYE   1 each





  BID SAIDA   Administration


 


Scopolamine  1.5 mg  10/25/20 11:00  11/15/20 11:21





  Scopolamine 1.5 Mg/72 Hour Patch  TD   1.5 mg





  Q3D SAIDA   Administration


 


Simvastatin  10 mg  10/19/20 21:00  11/14/20 22:15





  Simvastatin 10 Mg Tab  PO   10 mg





  2100 SAIDA   Administration


 


Sodium Chloride  10 ml  10/13/20 09:00  11/15/20 09:50





  Flush - Normal Saline 10 Ml Syringe  IVF   10 ml





  Q12HR SAIDA   Administration


 


Sodium Polystyrene Sulfonate  30 gm  10/29/20 09:00  11/14/20 09:00





  Sodium Polystyrene Sulfonate 15 Gm/60 Ml Bot  PO   Not Given





  Q2D SAIDA  


 


Sterile Water  1 ml  10/17/20 11:00  11/14/20 22:15





  Bacteriostatic Water 30 Ml Vial  FS   1 ml





  PRN PRN   Administration





  RECONSTITUTION  


 


Zinc Sulfate  220 mg  10/16/20 09:00  11/15/20 09:21





  Zinc Sulfate 220 Mg Cap  PO   220 mg





  DAILY SAIDA   Administration














- Exam


General Appearance: ill appearing


General - other findings: Vent dependent-chest tube


ENT: normocephalic atraumatic


Neck: supple


Heart: RRR


Respiratory: normal chest expansion


Gastrointestinal: normal bowel sounds


Neurological: no new deficit


Psychiatric: not oriented





Hosp A/P





- Plan








Acute hypoxic respiratory failure secondary to COVID-19


--intubated since 10/23. Trach and Peg placement plans for today


--cont supportive cares. Family consider withdraw care if no improvement after 

trach for a week. 





COVID-19 PNA


--mgt above. s/p covalescent plasma. cont steroid. Isolation was discontinued. 





Acute toxic encephalopathy 


--remains unresponsive even after sedations weaned off


--EEG no evidence of seizure. likely related to COVID-19


--CT head negative 





Bilateral pneumothoraces


--s/p devin chest tube placement, mgt as per surgery 





RAMONA on CKD stage III


--D since 10/29. 


- Dr. Mcnamara follows





Anemia of chronic kidney disease


--s/p transfusions. 





Metabolic Acidosis


--resolved 





Diabetes type 2


--BG elevated d/t steroid. Incr Lantus, cont ISS





HTN


--cont Metoprolol/hydralazine/Norvasc. 





Hypothyroidism


--cont Levothyroxine 





Klebsiella UTI, poa


--completed abx 





15th


Bilateral pneumothorax due to Covid pneumonia


Resolving left-sided pneumothorax


Chest tube placed





Ongoing dialysis today





Persistent respiratory failure requiring intubation for over 3 weeks


Neurologically she remained encephalopathic.


Prognosis remains guarded.








DVT ppx: Arixtra


GI ppx: PPI


Code Status: DNR
- Subjective


Encounter Date: 11/16/20


Encounter Time: 11:40


Subjective: 


No clinical change or improvement in her other than hemoglobin dropped to 6.2.  

Palliative is on board.  If she has no further improvement possible withdrawal 

of care discussion on Wednesday.  Will be dialyzed today.





- Objective


Vital Signs & Weight: 


                             Vital Signs (12 hours)











  Temp Pulse Resp BP Pulse Ox


 


 11/16/20 12:07   74   190/54 H 


 


 11/16/20 12:00    25 H  


 


 11/16/20 10:17   74   176/54 H 


 


 11/16/20 10:00    26 H  


 


 11/16/20 08:34   75   176/50 H 


 


 11/16/20 08:15   75   176/50 H 


 


 11/16/20 08:00  98.6 F   29 H   99


 


 11/16/20 07:41   75   176/50 H 


 


 11/16/20 06:00    27 H  


 


 11/16/20 04:00  98.8 F   28 H  


 


 11/16/20 02:59   74   


 


 11/16/20 02:00    28 H  








                                     Weight











Admit Weight                   176 lb 8 oz


 


Weight                         172 lb 2.896 oz











                            Most Recent Monitor Data











Heart Rate from ECG            72


 


NIBP                           190/54


 


NIBP BP-Mean                   99


 


Respiration from ECG           29


 


SpO2                           99














I&O: 


                                        











 11/15/20 11/16/20 11/17/20





 06:59 06:59 06:59


 


Intake Total 844.2 859 140


 


Output Total 714 521 140


 


Balance 130.2 338 0











Result Diagrams: 


                                 11/16/20 04:00





                                 11/16/20 04:00


Additional Labs: 


                                   Accuchecks











  11/16/20 11/16/20 11/14/20





  10:31 04:05 22:19


 


POC Glucose  75  223 H  41 L*














Hospitalist ROS





- Medication


Medications: 


Active Medications











Generic Name Dose Route Start Last Admin





  Trade Name Freq  PRN Reason Stop Dose Admin


 


Acetaminophen  650 mg  10/12/20 14:11  10/17/20 22:18





  Acetaminophen 325 Mg Tab  PO   650 mg





  Q4H PRN   Administration





  Headache/Fever/Mild Pain (1-3)  


 


Amlodipine Besylate  5 mg  10/13/20 09:00  11/16/20 08:15





  Amlodipine 5 Mg Tab  PO   5 mg





  BID SAIDA   Administration


 


Ascorbic Acid  1,000 mg  10/15/20 21:00  11/16/20 08:15





  Ascorbic Acid 500 Mg Chewable Tablet  PO   1,000 mg





  BID SAIDA   Administration


 


Brimonidine Tartrate  1 drop  10/13/20 09:00  11/16/20 08:17





  Brimonidine Tartrate 0.2% Ophth Soln 5 Ml Bottle  R EYE   1 drop





  BID SAIDA   Administration


 


Calcitriol  0.25 mcg  10/13/20 09:00  11/16/20 08:15





  Calcitriol 0.25 Mcg Cap  PO   0.25 mcg





  DAILY SAIDA   Administration


 


Cholecalciferol  1,000 units  10/13/20 09:00  11/16/20 08:22





  Cholecalciferol 1,000 Units (25 Mcg) Tab  PO   1,000 units





  DAILY SAIDA   Administration


 


Dextrose/Water  25 gm  10/12/20 14:15  11/14/20 22:20





  Dextrose 50% Abboject 50 Ml Syringe  SLOW IVP   25 gm





  PRN PRN   Administration





  Hypoglycemia  


 


Diphenhydramine HCl  25 mg  10/14/20 04:24  11/10/20 12:21





  Diphenhydramine 25 Mg Cap  PO   25 mg





  HSPRN PRN   Administration





  Insomnia  


 


Dorzolamide/Timolol  1 drop  10/13/20 09:00  11/16/20 08:17





  Dorzolamide/Timolol 2%/0.5% Ophth Soln 10 Ml Bottle  EA EYE   1 each





  TID SAIDA   Administration


 


Guaifenesin  600 mg  11/03/20 13:00  11/16/20 08:14





  Diabetic Tussin 200 Mg/10 Ml Udcup  PER TUBE   600 mg





  Q4HR SAIDA   Administration


 


Hydralazine HCl  75 mg  11/13/20 21:00  11/16/20 08:34





  Hydralazine 25 Mg Tab  PO   75 mg





  TID SAIDA   Administration


 


Dextrose/Water  1,000 mls @ 0 mls/hr  10/12/20 14:15  11/02/20 02:43





  D5w  IV   1,000 mls





  .Q0M PRN   Administration





  Hypoglycemia  





  As Directed  


 


Nicardipine HCl 50 mg/ Sodium  40 mg in 250 mls @ 0 mls/hr  11/03/20 13:30  

11/14/20 07:01





  Chloride  IVPB   250 mls





  INF SAIDA   Administration





  Protocol  





  Titrate  


 


Insulin Glargine 15 units/  0.15 mls @ 0 mls/hr  11/07/20 21:00  11/15/20 21:14





  Miscellaneous Medication  SC   0.15 mls





  HS SAIDA   Administration


 


Insulin Human Lispro  0 units  10/12/20 21:21  11/08/20 21:10





  Humalog 300 Units/3 Ml Vial  SC   2 unit





  .BEDTIME SLIDING SC PRN   Administration





  Bedtime Correctional Scale  


 


Insulin Human Lispro  0 units  10/28/20 10:45  11/16/20 04:55





  Humalog 300 Units/3 Ml Vial  SC   4 unit





  .MODERATE SLIDING SC PRN   Administration





  MODERATE SLIDING SCALE  





  Protocol  


 


Isosorbide Dinitrate  20 mg  10/22/20 21:00  11/16/20 08:22





  Isosorbide Dinitrate 20 Mg Tab  PO   20 mg





  BID SAIDA   Administration


 


Labetalol HCl  10 mg  10/12/20 14:18  11/16/20 12:07





  Labetalol Hcl 100 Mg/20 Ml Vial  SLOW IVP   10 mg





  Q4H PRN   Administration





  SBP Greater Than 180  


 


Levothyroxine Sodium  25 mcg  10/17/20 06:00  11/16/20 05:01





  Levothyroxine Sodium 25 Mcg Tab  PO   25 mcg





  0600 SAIDA   Administration


 


Methylprednisolone Sodium Succinate  40 mg  10/17/20 21:00  11/16/20 08:16





  Methylprednisolone Sod Succ 40 Mg Vial  IVP   40 mg





  Q12HR SAIDA   Administration


 


Metoprolol Tartrate  75 mg  11/13/20 21:00  11/16/20 08:14





  Metoprolol Tartrate 50 Mg Tab  PO   75 mg





  BID SAIDA   Administration


 


Pantoprazole Sodium  40 mg  10/27/20 09:00  11/16/20 08:16





  Pantoprazole 40 Mg Vial  IVP   40 mg





  DAILY SAIDA   Administration


 


Propylene Glycol  1 drop  10/13/20 09:00  11/16/20 08:17





  Polyethylene Glycol Opth Drop 15 Ml Bot  EA EYE   1 each





  BID SAIDA   Administration


 


Scopolamine  1.5 mg  10/25/20 11:00  11/15/20 11:21





  Scopolamine 1.5 Mg/72 Hour Patch  TD   1.5 mg





  Q3D SAIDA   Administration


 


Simvastatin  10 mg  10/19/20 21:00  11/15/20 21:05





  Simvastatin 10 Mg Tab  PO   10 mg





  2100 SAIDA   Administration


 


Sodium Chloride  10 ml  10/13/20 09:00  11/16/20 09:46





  Flush - Normal Saline 10 Ml Syringe  IVF   10 ml





  Q12HR SAIDA   Administration


 


Sodium Polystyrene Sulfonate  30 gm  10/29/20 09:00  11/16/20 09:46





  Sodium Polystyrene Sulfonate 15 Gm/60 Ml Bot  PO   Not Given





  Q2D SAIDA  


 


Sterile Water  1 ml  10/17/20 11:00  11/15/20 21:04





  Bacteriostatic Water 30 Ml Vial  FS   1 ml





  PRN PRN   Administration





  RECONSTITUTION  


 


Zinc Sulfate  220 mg  10/16/20 09:00  11/16/20 08:15





  Zinc Sulfate 220 Mg Cap  PO   220 mg





  DAILY SAIDA   Administration














- Exam


General Appearance: ill appearing


General - other findings: Vent dependent


Eye: PERRL


ENT: normocephalic atraumatic


Neck: supple


Heart: RRR


Respiratory: CTAB


Gastrointestinal: soft, normal bowel sounds


Neurological: no focal deficits


Psychiatric: A&O x 3





Hosp A/P





- Plan








Acute hypoxic respiratory failure secondary to COVID-19


--intubated since 10/23. Trach and Peg placement plans for today


--cont supportive cares. Family consider withdraw care if no improvement after 

trach for a week. 





COVID-19 PNA


--mgt above. s/p covalescent plasma. cont steroid. Isolation was discontinued. 





Acute toxic encephalopathy 


--remains unresponsive even after sedations weaned off


--EEG no evidence of seizure. likely related to COVID-19


--CT head negative 





Bilateral pneumothoraces


--s/p devin chest tube placement, mgt as per surgery 





RAMONA on CKD stage III


--D since 10/29. 


- Dr. Mcnamara follows





Anemia of chronic kidney disease


--s/p transfusions. 





Metabolic Acidosis


--resolved 





Diabetes type 2


--BG elevated d/t steroid. Incr Lantus, cont ISS





HTN


--cont Metoprolol/hydralazine/Norvasc. 





Hypothyroidism


--cont Levothyroxine 





Klebsiella UTI, poa


--completed abx 





15th


Bilateral pneumothorax due to Covid pneumonia


Resolving left-sided pneumothorax


Chest tube placed





Ongoing dialysis today





Persistent respiratory failure requiring intubation for over 3 weeks


Neurologically she remained encephalopathic.


Prognosis remains guarded.








DVT ppx: Arixtra


GI ppx: PPI


Code Status: DNR








16th


No clinical change or improvement in her other than hemoglobin dropped to 6.2.  

Palliative is on board.  If she has no further improvement possible withdrawal 

of care discussion on Wednesday.  Will be dialyzed today.


Likely also blood transfusion t
- Subjective


Encounter Date: 11/17/20


Encounter Time: 11:00


Subjective: 


Hospice involved in the care.  Plan to extubate her and convert her status to 

inpatient hospice.





- Objective


Vital Signs & Weight: 


                             Vital Signs (12 hours)











  Temp Pulse Resp BP Pulse Ox


 


 11/17/20 14:00    29 H  


 


 11/17/20 12:00    26 H  


 


 11/17/20 10:37   67   88/45 L 


 


 11/17/20 10:00    27 H  


 


 11/17/20 08:35   80   170/65 H 


 


 11/17/20 08:32   78   170/65 H 


 


 11/17/20 08:00    27 H   93 L


 


 11/17/20 07:23   85   188/67 H 


 


 11/17/20 07:00  98.1 F    


 


 11/17/20 06:00    27 H  


 


 11/17/20 04:00  98.7 F   29 H  








                                     Weight











Admit Weight                   176 lb 8 oz


 


Weight                         179 lb 0.246 oz











                            Most Recent Monitor Data











Heart Rate from ECG            81


 


NIBP                           132/43


 


NIBP BP-Mean                   72


 


Respiration from ECG           29


 


SpO2                           93














I&O: 


                                        











 11/16/20 11/17/20 11/18/20





 06:59 06:59 06:59


 


Intake Total 859 1911 150


 


Output Total 521 643 270


 


Balance 338 1268 -120











Result Diagrams: 


                                 11/17/20 04:15





                                 11/17/20 04:15


Additional Labs: 


                                   Accuchecks











  11/17/20 11/17/20 11/16/20





  10:05 04:12 22:19


 


POC Glucose  179 H  284 H  276 H














  11/16/20





  16:15


 


POC Glucose  243 H














Hospitalist ROS





- Medication


Medications: 


Active Medications











Generic Name Dose Route Start Last Admin





  Trade Name Freq  PRN Reason Stop Dose Admin


 


Acetaminophen  650 mg  10/12/20 14:11  10/17/20 22:18





  Acetaminophen 325 Mg Tab  PO   650 mg





  Q4H PRN   Administration





  Headache/Fever/Mild Pain (1-3)  


 


Amlodipine Besylate  5 mg  10/13/20 09:00  11/17/20 08:32





  Amlodipine 5 Mg Tab  PO   5 mg





  BID SAIDA   Administration


 


Ascorbic Acid  1,000 mg  10/15/20 21:00  11/17/20 08:31





  Ascorbic Acid 500 Mg Chewable Tablet  PO   1,000 mg





  BID SAIDA   Administration


 


Brimonidine Tartrate  1 drop  10/13/20 09:00  11/17/20 08:40





  Brimonidine Tartrate 0.2% Ophth Soln 5 Ml Bottle  R EYE   1 drop





  BID SAIDA   Administration


 


Calcitriol  0.25 mcg  10/13/20 09:00  11/17/20 08:31





  Calcitriol 0.25 Mcg Cap  PO   0.25 mcg





  DAILY SAIDA   Administration


 


Cholecalciferol  1,000 units  10/13/20 09:00  11/17/20 08:38





  Cholecalciferol 1,000 Units (25 Mcg) Tab  PO   1,000 units





  DAILY SAIDA   Administration


 


Dextrose/Water  25 gm  10/12/20 14:15  11/14/20 22:20





  Dextrose 50% Abboject 50 Ml Syringe  SLOW IVP   25 gm





  PRN PRN   Administration





  Hypoglycemia  


 


Diphenhydramine HCl  25 mg  10/14/20 04:24  11/10/20 12:21





  Diphenhydramine 25 Mg Cap  PO   25 mg





  HSPRN PRN   Administration





  Insomnia  


 


Dorzolamide/Timolol  1 drop  10/13/20 09:00  11/17/20 08:41





  Dorzolamide/Timolol 2%/0.5% Ophth Soln 10 Ml Bottle  EA EYE   1 each





  TID SAIDA   Administration


 


Guaifenesin  600 mg  11/03/20 13:00  11/17/20 08:32





  Diabetic Tussin 200 Mg/10 Ml Udcup  PER TUBE   600 mg





  Q4HR SAIDA   Administration


 


Hydralazine HCl  75 mg  11/13/20 21:00  11/17/20 07:23





  Hydralazine 25 Mg Tab  PO   75 mg





  TID SAIDA   Administration


 


Dextrose/Water  1,000 mls @ 0 mls/hr  10/12/20 14:15  11/02/20 02:43





  D5w  IV   1,000 mls





  .Q0M PRN   Administration





  Hypoglycemia  





  As Directed  


 


Nicardipine HCl 50 mg/ Sodium  40 mg in 250 mls @ 0 mls/hr  11/03/20 13:30  

11/14/20 07:01





  Chloride  IVPB   250 mls





  INF SAIDA   Administration





  Protocol  





  Titrate  


 


Insulin Glargine 15 units/  0.15 mls @ 0 mls/hr  11/07/20 21:00  11/16/20 20:27





  Miscellaneous Medication  SC   0.15 mls





  HS SAIDA   Administration


 


Insulin Human Lispro  0 units  10/12/20 21:21  11/17/20 05:14





  Humalog 300 Units/3 Ml Vial  SC   3 unit





  .BEDTIME SLIDING SC PRN   Administration





  Bedtime Correctional Scale  


 


Insulin Human Lispro  0 units  10/28/20 10:45  11/16/20 16:17





  Humalog 300 Units/3 Ml Vial  SC   4 unit





  .MODERATE SLIDING SC PRN   Administration





  MODERATE SLIDING SCALE  





  Protocol  


 


Isosorbide Dinitrate  20 mg  10/22/20 21:00  11/17/20 08:38





  Isosorbide Dinitrate 20 Mg Tab  PO   20 mg





  BID SAIDA   Administration


 


Labetalol HCl  10 mg  10/12/20 14:18  11/16/20 12:07





  Labetalol Hcl 100 Mg/20 Ml Vial  SLOW IVP   10 mg





  Q4H PRN   Administration





  SBP Greater Than 180  


 


Levothyroxine Sodium  25 mcg  10/17/20 06:00  11/17/20 05:14





  Levothyroxine Sodium 25 Mcg Tab  PO   25 mcg





  0600 SAIDA   Administration


 


Methylprednisolone Sodium Succinate  40 mg  10/17/20 21:00  11/17/20 08:33





  Methylprednisolone Sod Succ 40 Mg Vial  IVP   40 mg





  Q12HR SAIDA   Administration


 


Metoprolol Tartrate  75 mg  11/13/20 21:00  11/17/20 07:23





  Metoprolol Tartrate 50 Mg Tab  PO   75 mg





  BID SAIDA   Administration


 


Pantoprazole Sodium  40 mg  10/27/20 09:00  11/17/20 08:33





  Pantoprazole 40 Mg Vial  IVP   40 mg





  DAILY SAIDA   Administration


 


Propylene Glycol  1 drop  10/13/20 09:00  11/17/20 08:40





  Polyethylene Glycol Opth Drop 15 Ml Bot  EA EYE   1 each





  BID SAIDA   Administration


 


Scopolamine  1.5 mg  10/25/20 11:00  11/15/20 11:21





  Scopolamine 1.5 Mg/72 Hour Patch  TD   1.5 mg





  Q3D SAIDA   Administration


 


Simvastatin  10 mg  10/19/20 21:00  11/16/20 20:27





  Simvastatin 10 Mg Tab  PO   10 mg





  2100 SAIDA   Administration


 


Sodium Chloride  10 ml  10/13/20 09:00  11/16/20 20:38





  Flush - Normal Saline 10 Ml Syringe  IVF   10 ml





  Q12HR SAIDA   Administration


 


Sodium Polystyrene Sulfonate  30 gm  10/29/20 09:00  11/16/20 09:46





  Sodium Polystyrene Sulfonate 15 Gm/60 Ml Bot  PO   Not Given





  Q2D SAIDA  


 


Sterile Water  1 ml  10/17/20 11:00  11/15/20 21:04





  Bacteriostatic Water 30 Ml Vial  FS   1 ml





  PRN PRN   Administration





  RECONSTITUTION  


 


Zinc Sulfate  220 mg  10/16/20 09:00  11/17/20 08:32





  Zinc Sulfate 220 Mg Cap  PO   220 mg





  DAILY SAIDA   Administration














- Exam


General Appearance: ill appearing





Hosp A/P





- Plan








Acute hypoxic respiratory failure secondary to COVID-19


--intubated since 10/23. Trach and Peg placement plans for today


--cont supportive cares. Family consider withdraw care if no improvement after 

trach for a week. 





COVID-19 PNA


--mgt above. s/p covalescent plasma. cont steroid. Isolation was discontinued. 





Acute toxic encephalopathy 


--remains unresponsive even after sedations weaned off


--EEG no evidence of seizure. likely related to COVID-19


--CT head negative 





Bilateral pneumothoraces


--s/p devin chest tube placement, mgt as per surgery 





RAMONA on CKD stage III


--D since 10/29. 


- Dr. Mcnamara follows





Anemia of chronic kidney disease


--s/p transfusions. 





Metabolic Acidosis


--resolved 





Diabetes type 2


--BG elevated d/t steroid. Incr Lantus, cont ISS





HTN


--cont Metoprolol/hydralazine/Norvasc. 





Hypothyroidism


--cont Levothyroxine 





Klebsiella UTI, poa


--completed abx 





15th


Bilateral pneumothorax due to Covid pneumonia


Resolving left-sided pneumothorax


Chest tube placed





Ongoing dialysis today





Persistent respiratory failure requiring intubation for over 3 weeks


Neurologically she remained encephalopathic.


Prognosis remains guarded.








DVT ppx: Arixtra


GI ppx: PPI


Code Status: DNR








16th


No clinical change or improvement in her other than hemoglobin dropped to 6.2.  

Palliative is on board.  If she has no further improvement possible withdrawal 

of care discussion on Wednesday.  Will be dialyzed today.


Likely also blood transfusion t





17th


Withdrawal of care today and  inpatient hospice likely.
- Subjective


Subjective: 


Patient was seen examined at bedside.  She remains on high flow, her oxygen 

requirement is improving.  Her urine culture positive for gram-negative bhavana.  

Patient currently is on empiric IV antibiotic.  Her C-reactive protein is 

improving.  Patient appeared to be more comfortable.  I have updated her 

daughters on the phone.





- Objective


Vital Signs & Weight: 


                             Vital Signs (12 hours)











  Temp Pulse Pulse Ox


 


 10/15/20 13:49   73 


 


 10/15/20 12:00  98.2 F  


 


 10/15/20 09:00   73 


 


 10/15/20 08:00  97.9 F   93 L


 


 10/15/20 05:00  97.5 F L  








                                     Weight











Admit Weight                   176 lb 8 oz


 


Weight                         178 lb 6.4 oz











                            Most Recent Monitor Data











Heart Rate from ECG            67


 


NIBP                           142/62


 


NIBP BP-Mean                   88


 


Respiration from ECG           27


 


SpO2                           95














I&O: 


                                        











 10/14/20 10/15/20 10/16/20





 06:59 06:59 06:59


 


Intake Total 700 1230 


 


Output Total  1350 


 


Balance 700 -120 











Result Diagrams: 


                                 10/15/20 03:19





                                 10/15/20 03:19


Additional Labs: 


                                   Accuchecks











  10/15/20 10/15/20 10/15/20





  15:23 06:06 02:30


 


POC Glucose  191 H  193 H  178 H














  10/14/20 10/14/20





  20:25 18:26


 


POC Glucose  250 H  149 H











Radiology Reviewed by me: Yes


EKG Reviewed by me: Yes





Hospitalist ROS





- Medication


Medications: 


Active Medications











Generic Name Dose Route Start Last Admin





  Trade Name Freq  PRN Reason Stop Dose Admin


 


Acetaminophen  650 mg  10/12/20 14:11  10/13/20 19:15





  Acetaminophen 325 Mg Tab  PO   650 mg





  Q4H PRN   Administration





  Headache/Fever/Mild Pain (1-3)  


 


Amlodipine Besylate  5 mg  10/13/20 09:00  10/15/20 09:00





  Amlodipine 5 Mg Tab  PO   5 mg





  BID SAIDA   Administration


 


Benzonatate  100 mg  10/13/20 09:28  10/14/20 08:40





  Benzonatate 100 Mg Cap  PO   100 mg





  Q4H PRN   Administration





  Cough  


 


Brimonidine Tartrate  1 drop  10/13/20 09:00  10/15/20 09:01





  Brimonidine Tartrate 0.2% Ophth Soln 5 Ml Bottle  R EYE   1 drop





  BID SAIDA   Administration


 


Calcitriol  0.25 mcg  10/13/20 09:00  10/15/20 09:00





  Calcitriol 0.25 Mcg Cap  PO   0.25 mcg





  DAILY SAIDA   Administration


 


Cholecalciferol  1,000 units  10/13/20 09:00  10/15/20 09:00





  Cholecalciferol 1,000 Units (25 Mcg) Tab  PO   1,000 units





  DAILY SAIDA   Administration


 


Dexamethasone  6 mg  10/13/20 09:00  10/15/20 09:00





  Dexamethasone 4 Mg/Ml Vial  SLOW IVP   6 mg





  DAILY SAIDA   Administration


 


Dorzolamide/Timolol  1 drop  10/13/20 09:00  10/15/20 14:14





  Dorzolamide/Timolol 2%/0.5% Ophth Soln 10 Ml Bottle  EA EYE   Not Given





  TID SAIDA  


 


Enoxaparin Sodium  30 mg  10/13/20 09:00  10/15/20 08:59





  Enoxaparin Sodium 30 Mg/0.3 Ml Syringe  SC   30 mg





  0900 SAIDA   Administration


 


Fish Oil  3,000 mg  10/13/20 09:00  10/15/20 09:00





  Fish Oil 1,000 Mg Cap  PO   3,000 mg





  DAILY SAIDA   Administration


 


Furosemide  40 mg  10/15/20 09:00  10/15/20 09:01





  Furosemide 40 Mg/4 Ml Vial  SLOW IVP   40 mg





  DAILY SAIDA   Administration


 


Glipizide  5 mg  10/13/20 09:00  10/15/20 09:23





  Glipizide Xl 5 Mg Tablet  PO   5 mg





  BID SAIDA   Administration


 


Guaifenesin  600 mg  10/13/20 21:00  10/15/20 09:00





  Guaifenesin Er 600 Mg Tab  PO   600 mg





  Q12HR SAIDA   Administration


 


Hydralazine HCl  50 mg  10/13/20 09:00  10/15/20 13:49





  Hydralazine 25 Mg Tab  PO   Not Given





  TID SAIDA  


 


Insulin Glargine 45 units/  0.45 mls @ 0 mls/hr  10/13/20 09:00  10/15/20 08:59





  Miscellaneous Medication  SC   0.45 mls





  BID SAIDA   Administration


 


Azithromycin 500 mg/ Sodium  250 mls @ 250 mls/hr  10/14/20 10:00  10/15/20 

09:23





  Chloride  IVPB   250 mls





  Q24HR SAIDA   Administration


 


Cefepime HCl 1 gm/ Sodium  100 mls @ 200 mls/hr  10/14/20 21:00  10/15/20 08:58





  Chloride  IVPB   100 mls





  Q12HR SAIDA   Administration


 


Insulin Human Lispro  0 units  10/12/20 14:15  10/15/20 06:04





  Humalog 300 Units/3 Ml Vial  SC   2 unit





  .MILD SLIDING SCALE PRN   Administration





  Mild Correctional Scale  


 


Insulin Human Lispro  0 units  10/12/20 21:21  10/14/20 21:58





  Humalog 300 Units/3 Ml Vial  SC   2 unit





  .BEDTIME SLIDING SC PRN   Administration





  Bedtime Correctional Scale  


 


Labetalol HCl  10 mg  10/12/20 14:18  10/14/20 01:50





  Labetalol Hcl 100 Mg/20 Ml Vial  SLOW IVP   2 ml





  Q4H PRN   Administration





  SBP Greater Than 180  


 


Levothyroxine Sodium  25 mcg  10/13/20 09:00  10/15/20 09:00





  Levothyroxine Sodium 25 Mcg Tab  PO   25 mcg





  DAILY SAIDA   Administration


 


Melatonin  3 mg  10/14/20 02:12  10/14/20 02:21





  Melatonin 3 Mg Tab  PO   3 mg





  HSPRN PRN   Administration





  Insomnia  


 


Metoprolol Tartrate  50 mg  10/13/20 09:00  10/15/20 09:00





  Metoprolol Tartrate 50 Mg Tab  PO   50 mg





  BID SAIDA   Administration


 


Pantoprazole Sodium  40 mg  10/13/20 09:00  10/15/20 09:01





  Pantoprazole 40 Mg Tab  PO   40 mg





  DAILY SAIDA   Administration


 


Polyvinyl Alcohol/Povidone  1 each  10/13/20 09:00  10/15/20 09:01





  Polyvinyl Alcohol 1.4%/Povidone 0.6% Opth Drops  EA EYE   1 each





  DAILY SAIDA   Administration


 


Propylene Glycol  1 drop  10/13/20 09:00  10/15/20 09:01





  Polyethylene Glycol Opth Drop 15 Ml Bot  EA EYE   1 each





  BID SAIDA   Administration


 


Simvastatin  10 mg  10/13/20 17:00  10/14/20 18:08





  Simvastatin 10 Mg Tab  PO   10 mg





  QPM-WM SAIDA   Administration


 


Sodium Chloride  10 ml  10/13/20 09:00  10/15/20 09:02





  Flush - Normal Saline 10 Ml Syringe  IVF   10 ml





  Q12HR SAIDA   Administration














- Exam


General Appearance: NAD


Eye: PERRL


ENT: normocephalic atraumatic


Neck: supple


Heart: RRR


Respiratory: rales


Gastrointestinal: soft


Extremities: no cyanosis


Skin: normal turgor


Neurological: cranial nerve grossly intact


Musculoskeletal: normal tone


Psychiatric: normal affect, normal behavior, A&O x 3





Hosp A/P





- Plan





Patient is a pleasant 64 years old female with multiple comorbidities including 

diabetes type 2, hypertension, chronic systolic heart failure, hypertension who 

was diagnosed with Covid approximately 3 days ago, presented to ED with 

worsening dyspnea.





Acute hypoxic respiratory failure secondary to COVID-19


--comfortable on HIFLo, wean as tolerate. s/p convalescent plasma. cont 

Decadron, Vit C/D/Zinc 


--Not a candidate for Remdesivir d/t renal function. Appreciate ID input


--consider consult Pulmonology if she further decompensate 


--cont IV Lasix given even evidence of Pul edema on rpt CXR. Pt was on home 

Lasix.


--Check Echo when able.  


--rpt CXR





COVID-19 PNA


--mgt aabove. 





Diabetes type 2


--BG elevated d/t steroid. Resume Lantus, cont ISS





RAMONA on CKD stage III


--Monitor, avoid nephrotoxic agent. Follow BMP. Cr stable. 





Hypothyroidism


--cont Levothyroxine 





DVT ppx: Lovenox


GI ppx: PPI


Code Status: Full code


Anticipated Dispo: Home when medically stable





I have updated her daughter, Mindy over the phone with regard to her change of 

condition.
- Subjective


Subjective: 


Patient was seen examined at bedside.  Unfortunately, she decompensated 

overnight.  Patient require Ventimask.  Stat ABG found that she has severe 

hypoxia.  Her lung looks worse CXR based on my review, final report pending.  

Patient is status post convalescent plasma, currently is on Decadron.  Patient 

is not a candidate for remdesivir given her renal function.  I have discussed 

with Dr. Whitney on the phone, given her decompensation in her respiratory status,

will reinstitute her empiric IV antibiotics.  I also updated her daughter over 

the phone with regard to her change of condition.  Patient will be better transf

er monitor in IMCU for higher level of care with concerning of decompensation.





- Objective


Vital Signs & Weight: 


                             Vital Signs (12 hours)











  Temp Pulse Resp BP Pulse Ox


 


 10/14/20 08:25  98.3 F  73  28 H  190/79 H  96


 


 10/14/20 05:59      94 L


 


 10/14/20 04:47      94 L


 


 10/14/20 04:46      90 L


 


 10/14/20 04:45  98.7 F  77  22 H  146/66 H  78 L


 


 10/14/20 01:49  98.1 F  76  18  192/77 H  93 L








                                     Weight











Admit Weight                   176 lb 8 oz


 


Weight                         178 lb 6.4 oz














I&O: 


                                        











 10/13/20 10/14/20 10/15/20





 06:59 06:59 06:59


 


Intake Total 250 700 


 


Output Total 300  


 


Balance -50 700 











Result Diagrams: 


                                 10/13/20 05:05





                                 10/13/20 05:05


Additional Labs: 


                                   Accuchecks











  10/14/20 10/14/20 10/13/20





  06:43 01:55 21:53


 


POC Glucose  93  167 H  203 H














  10/13/20 10/13/20 10/12/20





  16:22 11:07 20:18


 


POC Glucose  249 H  322 H  331 H











Radiology Reviewed by me: Yes


EKG Reviewed by me: Yes





Hospitalist ROS





- Medication


Medications: 


Active Medications











Generic Name Dose Route Start Last Admin





  Trade Name Freq  PRN Reason Stop Dose Admin


 


Acetaminophen  650 mg  10/12/20 14:11  10/13/20 19:15





  Acetaminophen 325 Mg Tab  PO   650 mg





  Q4H PRN   Administration





  Headache/Fever/Mild Pain (1-3)  


 


Amlodipine Besylate  5 mg  10/13/20 09:00  10/14/20 08:13





  Amlodipine 5 Mg Tab  PO   5 mg





  BID SAIDA   Administration


 


Benzonatate  100 mg  10/13/20 09:28  10/14/20 08:40





  Benzonatate 100 Mg Cap  PO   100 mg





  Q4H PRN   Administration





  Cough  


 


Brimonidine Tartrate  1 drop  10/13/20 09:00  10/14/20 08:15





  Brimonidine Tartrate 0.2% Ophth Soln 5 Ml Bottle  R EYE   1 drop





  BID SAIDA   Administration


 


Calcitriol  0.25 mcg  10/13/20 09:00  10/14/20 08:13





  Calcitriol 0.25 Mcg Cap  PO   0.25 mcg





  DAILY SAIDA   Administration


 


Cholecalciferol  1,000 units  10/13/20 09:00  10/14/20 08:14





  Cholecalciferol 1,000 Units (25 Mcg) Tab  PO   1,000 units





  DAILY SAIDA   Administration


 


Dexamethasone  6 mg  10/13/20 09:00  10/14/20 08:14





  Dexamethasone 4 Mg/Ml Vial  SLOW IVP   6 mg





  DAILY SAIDA   Administration


 


Dorzolamide/Timolol  1 drop  10/13/20 09:00  10/14/20 08:15





  Dorzolamide/Timolol 2%/0.5% Ophth Soln 10 Ml Bottle  EA EYE   1 each





  TID SAIDA   Administration


 


Enoxaparin Sodium  30 mg  10/13/20 09:00  10/14/20 08:12





  Enoxaparin Sodium 30 Mg/0.3 Ml Syringe  SC   30 mg





  0900 SAIDA   Administration


 


Fish Oil  3,000 mg  10/13/20 09:00  10/14/20 08:13





  Fish Oil 1,000 Mg Cap  PO   3,000 mg





  DAILY SAIDA   Administration


 


Furosemide  40 mg  10/14/20 09:15  10/14/20 09:54





  Furosemide 40 Mg/4 Ml Vial  SLOW IVP  10/14/20 12:00  40 mg





  NOW SAIDA   Administration


 


Glipizide  5 mg  10/13/20 09:00  10/14/20 08:14





  Glipizide Xl 5 Mg Tablet  PO   5 mg





  BID SAIDA   Administration


 


Guaifenesin  600 mg  10/13/20 21:00  10/14/20 08:14





  Guaifenesin Er 600 Mg Tab  PO   600 mg





  Q12HR SAIDA   Administration


 


Hydralazine HCl  50 mg  10/13/20 09:00  10/14/20 08:13





  Hydralazine 25 Mg Tab  PO   50 mg





  TID SAIDA   Administration


 


Insulin Glargine 45 units/  0.45 mls @ 0 mls/hr  10/13/20 09:00  10/14/20 10:34





  Miscellaneous Medication  SC   Not Given





  BID SAIDA  


 


Insulin Human Lispro  0 units  10/12/20 14:15  10/13/20 18:35





  Humalog 300 Units/3 Ml Vial  SC   3 unit





  .MILD SLIDING SCALE PRN   Administration





  Mild Correctional Scale  


 


Insulin Human Lispro  0 units  10/12/20 21:21  10/12/20 21:55





  Humalog 300 Units/3 Ml Vial  SC   4 unit





  .BEDTIME SLIDING SC PRN   Administration





  Bedtime Correctional Scale  


 


Labetalol HCl  10 mg  10/12/20 14:18  10/14/20 01:50





  Labetalol Hcl 100 Mg/20 Ml Vial  SLOW IVP   2 ml





  Q4H PRN   Administration





  SBP Greater Than 180  


 


Levothyroxine Sodium  25 mcg  10/13/20 09:00  10/14/20 08:13





  Levothyroxine Sodium 25 Mcg Tab  PO   25 mcg





  DAILY SAIDA   Administration


 


Melatonin  3 mg  10/14/20 02:12  10/14/20 02:21





  Melatonin 3 Mg Tab  PO   3 mg





  HSPRN PRN   Administration





  Insomnia  


 


Metoprolol Tartrate  50 mg  10/13/20 09:00  10/14/20 08:12





  Metoprolol Tartrate 50 Mg Tab  PO   50 mg





  BID SAIDA   Administration


 


Pantoprazole Sodium  40 mg  10/13/20 09:00  10/14/20 08:13





  Pantoprazole 40 Mg Tab  PO   40 mg





  DAILY SAIDA   Administration


 


Polyvinyl Alcohol/Povidone  1 each  10/13/20 09:00  10/13/20 21:38





  Polyvinyl Alcohol 1.4%/Povidone 0.6% Opth Drops  EA EYE   1 each





  DAILY SAIDA   Administration


 


Propylene Glycol  1 drop  10/13/20 09:00  10/14/20 08:16





  Polyethylene Glycol Opth Drop 15 Ml Bot  EA EYE   1 each





  BID SAIDA   Administration


 


Simvastatin  10 mg  10/13/20 17:00  10/13/20 16:13





  Simvastatin 10 Mg Tab  PO   10 mg





  QPM-WM SAIDA   Administration


 


Sodium Chloride  10 ml  10/13/20 09:00  10/14/20 08:16





  Flush - Normal Saline 10 Ml Syringe  IVF   10 ml





  Q12HR SAIDA   Administration














- Exam


General Appearance: NAD


Eye: PERRL


ENT: normocephalic atraumatic


Neck: supple


Heart: RRR, no murmur


Respiratory: rhonchi


Gastrointestinal: soft


Extremities: no cyanosis, no clubbing, no edema


Skin: normal turgor


Neurological: cranial nerve grossly intact


Musculoskeletal: normal tone


Psychiatric: normal affect, normal behavior, A&O x 3





Hosp A/P





- Plan





Patient is a pleasant 64 years old female with multiple comorbidities including 

diabetes type 2, hypertension, chronic systolic heart failure, hypertension who 

was diagnosed with Covid approximately 3 days ago, presented to ED with 

worsening dyspnea.





Acute hypoxic respiratory failure secondary to COVID-19


--cont O2 supplement, wean as tolerate. s/p convalescent plasma. cont Decadron


--Not a candidate for Remdesivir d/t renal function 


--D/w Dr. Devonte FONSECA to reinstitute empiric IV abx


--ABG reviewed. Transition to [facility], and transfer to IMCU for close monitor 


--Will consult Pulmonology if she de-compensate further 


--Start IV Lasix given even evidence of Pul edema on rpt CXR. Pt was on home 

Lasix.


--Check Echo when able.  





COVID-19 PNA


--mgt aabove. 





Diabetes type 2


--BG elevated d/t steroid. Resume Lantus, cont ISS





RAMONA on CKD stage III


--Monitor, avoid nephrotoxic agent. Follow BMP





Hypothyroidism


--cont Levothyroxine 





DVT ppx: Lovenox


GI ppx: PPI


Code Status: Full code


Anticipated Dispo: Home when medically stable





I have updated her daughter over the phone with regard to her change of 

condition.
- Subjective


Subjective: 


Patient was seen examined.  Discussed with nursing staff.  Her blood pressure 

was elevated overnight, she was started on Cardene drip.  He is scheduled 

tentatively for trach and PEG later today





- Objective


Vital Signs & Weight: 


                             Vital Signs (12 hours)











  Temp Pulse Resp BP Pulse Ox


 


 11/13/20 15:07   79   137/73 


 


 11/13/20 14:53   77   


 


 11/13/20 14:00    30 H  


 


 11/13/20 12:00  98.7 F   25 H  


 


 11/13/20 10:54   93   


 


 11/13/20 10:00    39 H  


 


 11/13/20 09:53   86   147/86 H 


 


 11/13/20 09:52   86   147/86 H 


 


 11/13/20 08:00  98.7 F   30 H   96


 


 11/13/20 07:08   86   


 


 11/13/20 06:00    27 H  








                                     Weight











Admit Weight                   176 lb 8 oz


 


Weight                         172 lb 13.478 oz











                            Most Recent Monitor Data











Heart Rate from ECG            81


 


NIBP                           149/65


 


NIBP BP-Mean                   93


 


Respiration from ECG           31


 


SpO2                           95














I&O: 


                                        











 11/12/20 11/13/20 11/14/20





 06:59 06:59 06:59


 


Intake Total 892 1134 120


 


Output Total 922 842 340


 


Balance -30 292 -220











Result Diagrams: 


                                 11/13/20 03:45





                                 11/13/20 03:45


Additional Labs: 


                                   Accuchecks











  11/13/20 11/12/20





  10:45 22:54


 


POC Glucose  162 H  175 H











Radiology Reviewed by me: Yes


EKG Reviewed by me: Yes





Hospitalist ROS





- Medication


Medications: 


Active Medications











Generic Name Dose Route Start Last Admin





  Trade Name Freq  PRN Reason Stop Dose Admin


 


Acetaminophen  650 mg  10/12/20 14:11  10/17/20 22:18





  Acetaminophen 325 Mg Tab  PO   650 mg





  Q4H PRN   Administration





  Headache/Fever/Mild Pain (1-3)  


 


Amlodipine Besylate  5 mg  10/13/20 09:00  11/13/20 09:53





  Amlodipine 5 Mg Tab  PO   5 mg





  BID SAIDA   Administration


 


Ascorbic Acid  1,000 mg  10/15/20 21:00  11/13/20 09:52





  Ascorbic Acid 500 Mg Chewable Tablet  PO   1,000 mg





  BID SAIDA   Administration


 


Brimonidine Tartrate  1 drop  10/13/20 09:00  11/13/20 11:00





  Brimonidine Tartrate 0.2% Ophth Soln 5 Ml Bottle  R EYE   1 drop





  BID SAIDA   Administration


 


Calcitriol  0.25 mcg  10/13/20 09:00  11/13/20 09:52





  Calcitriol 0.25 Mcg Cap  PO   0.25 mcg





  DAILY SAIDA   Administration


 


Cholecalciferol  1,000 units  10/13/20 09:00  11/13/20 11:14





  Cholecalciferol 1,000 Units (25 Mcg) Tab  PO   1,000 units





  DAILY SAIDA   Administration


 


Dextrose/Water  25 gm  10/12/20 14:15  11/01/20 17:30





  Dextrose 50% Abboject 50 Ml Syringe  SLOW IVP   25 gm





  PRN PRN   Administration





  Hypoglycemia  


 


Diphenhydramine HCl  25 mg  10/14/20 04:24  11/10/20 12:21





  Diphenhydramine 25 Mg Cap  PO   25 mg





  HSPRN PRN   Administration





  Insomnia  


 


Dorzolamide/Timolol  1 drop  10/13/20 09:00  11/13/20 15:02





  Dorzolamide/Timolol 2%/0.5% Ophth Soln 10 Ml Bottle  EA EYE   1 each





  TID SAIDA   Administration


 


Guaifenesin  600 mg  11/03/20 13:00  11/13/20 12:35





  Diabetic Tussin 200 Mg/10 Ml Udcup  PER TUBE   600 mg





  Q4HR SAIDA   Administration


 


Dextrose/Water  1,000 mls @ 0 mls/hr  10/12/20 14:15  11/02/20 02:43





  D5w  IV   1,000 mls





  .Q0M PRN   Administration





  Hypoglycemia  





  As Directed  


 


Nicardipine HCl 50 mg/ Sodium  40 mg in 250 mls @ 0 mls/hr  11/03/20 13:30  

11/13/20 14:59





  Chloride  IVPB   250 mls





  INF SAIDA   Administration





  Protocol  





  Titrate  


 


Insulin Glargine 15 units/  0.15 mls @ 0 mls/hr  11/07/20 21:00  11/12/20 21:10





  Miscellaneous Medication  SC   0.15 mls





  HS SAIDA   Administration


 


Insulin Human Lispro  0 units  10/12/20 21:21  11/08/20 21:10





  Humalog 300 Units/3 Ml Vial  SC   2 unit





  .BEDTIME SLIDING SC PRN   Administration





  Bedtime Correctional Scale  


 


Insulin Human Lispro  0 units  10/28/20 10:45  11/13/20 12:32





  Humalog 300 Units/3 Ml Vial  SC   2 unit





  .MODERATE SLIDING SC PRN   Administration





  MODERATE SLIDING SCALE  





  Protocol  


 


Isosorbide Dinitrate  20 mg  10/22/20 21:00  11/13/20 11:14





  Isosorbide Dinitrate 20 Mg Tab  PO   20 mg





  BID SAIDA   Administration


 


Labetalol HCl  10 mg  10/12/20 14:18  11/12/20 03:05





  Labetalol Hcl 100 Mg/20 Ml Vial  SLOW IVP   10 mg





  Q4H PRN   Administration





  SBP Greater Than 180  


 


Levothyroxine Sodium  25 mcg  10/17/20 06:00  11/13/20 06:12





  Levothyroxine Sodium 25 Mcg Tab  PO   25 mcg





  0600 SAIDA   Administration


 


Methylprednisolone Sodium Succinate  40 mg  10/17/20 21:00  11/13/20 09:52





  Methylprednisolone Sod Succ 40 Mg Vial  IVP   40 mg





  Q12HR SAIDA   Administration


 


Pantoprazole Sodium  40 mg  10/27/20 09:00  11/13/20 09:51





  Pantoprazole 40 Mg Vial  IVP   40 mg





  DAILY SAIDA   Administration


 


Propylene Glycol  1 drop  10/13/20 09:00  11/13/20 11:14





  Polyethylene Glycol Opth Drop 15 Ml Bot  EA EYE   1 each





  BID SAIDA   Administration


 


Scopolamine  1.5 mg  10/25/20 11:00  11/12/20 11:14





  Scopolamine 1.5 Mg/72 Hour Patch  TD   1.5 mg





  Q3D SAIDA   Administration


 


Simvastatin  10 mg  10/19/20 21:00  11/12/20 21:11





  Simvastatin 10 Mg Tab  PO   10 mg





  2100 SAIDA   Administration


 


Sodium Chloride  10 ml  10/13/20 09:00  11/13/20 10:52





  Flush - Normal Saline 10 Ml Syringe  IVF   10 ml





  Q12HR SAIDA   Administration


 


Sodium Polystyrene Sulfonate  30 gm  10/29/20 09:00  11/12/20 09:08





  Sodium Polystyrene Sulfonate 15 Gm/60 Ml Bot  PO   Not Given





  Q2D SAIDA  


 


Sterile Water  1 ml  10/17/20 11:00  11/10/20 09:48





  Bacteriostatic Water 30 Ml Vial  FS   1 ml





  PRN PRN   Administration





  RECONSTITUTION  


 


Vecuronium Bromide  10 mg  10/23/20 11:13  11/10/20 21:31





  Vecuronium 10 Mg Vial  IV   10 mg





  Q1H PRN   Administration





   SEDATION  


 


Zinc Sulfate  220 mg  10/16/20 09:00  11/13/20 09:52





  Zinc Sulfate 220 Mg Cap  PO   220 mg





  DAILY SAIDA   Administration














- Exam


General - other findings: Intubated, open eyes but not tracking 


Eye: PERRL


ENT: normocephalic atraumatic


Neck: supple


Heart: RRR, no murmur


Respiratory: rhonchi


Gastrointestinal: soft, non-tender


Neurological - other findings: eye opens with stimuli, not tracking or follow 

commands 


Musculoskeletal: normal tone





[facility]





- Plan





Patient is a pleasant 64 years old female with multiple comorbidities including 

diabetes type 2, hypertension, chronic systolic heart failure, hypertension who 

was diagnosed with Covid approximately 3 days ago, presented to ED with 

worsening dyspnea.





Acute hypoxic respiratory failure secondary to COVID-19


--further management as per pulmonology


--intubated since 10/23. Trach and Peg placement plans for today


--cont supportive cares. Family consider withdraw care if no improvement after 

trach for a week. 





COVID-19 PNA


--mgt above. s/p covalescent plasma. cont steroid. Isolation was discontinued. 





Acute toxic encephalopathy 


--remains unresponsive even after sedations weaned off


--EEG no evidence of seizure. likely related to COVID-19


--CT head negative 





Bilateral pneumothoraces


--s/p devin chest tube placement, mgt as per surgery 





RAMONA on CKD stage III


--now on HD since 10/29. Further mgt as per Dr. Mcnamara





Anemia of chronic kidney disease


--s/p transfusions. Follow CBC. Keep Hb above 7





Metabolic Acidosis


--resolved 





Diabetes type 2


--BG elevated d/t steroid. Incr Lantus, cont ISS





HTN


--cont Metoprolol/hydralazine/Norvasc. Dose adjusted. Wean off cardene as BP 

tolerate. 





Hypothyroidism


--cont Levothyroxine 





Klebsiella UTI, poa


--completed abx 





DVT ppx: Arixtra


GI ppx: PPI


Code Status: Full code


Anticipated Dispo: Home when medically stable
- Subjective


Subjective: 


Pt remains intubated, unresponsive even though she has weaned off of sedations. 

I have updated her daughter, Mindy. D/w Dr. Mcnamara, dialyze today, and give her 2 

units of PRBC. 





- Objective


Vital Signs & Weight: 


                             Vital Signs (12 hours)











  Temp Pulse Pulse Resp BP BP


 


 11/08/20 12:09  98.4 F   101 H  25 H   98/50 L


 


 11/08/20 10:54   94    145/51 H 


 


 11/08/20 08:56   109 H    198/75 H 


 


 11/08/20 08:55   108 H    192/72 H 


 


 11/08/20 07:00   106 H    188/69 H 


 


 11/08/20 06:00     30 H  


 


 11/08/20 04:00  98.8 F    28 H  


 


 11/08/20 03:16   100    167/56 H 








                                     Weight











Admit Weight                   176 lb 8 oz


 


Weight                         183 lb 3.266 oz











                            Most Recent Monitor Data











Heart Rate from ECG            109


 


NIBP                           123/68


 


NIBP BP-Mean                   86


 


Respiration from ECG           26


 


SpO2                           100














I&O: 


                                        











 11/07/20 11/08/20 11/09/20





 06:59 06:59 06:59


 


Intake Total 1610 1765 350


 


Output Total 1054 1860 


 


Balance 556 -95 350











Result Diagrams: 


                                 11/08/20 03:04





                                 11/08/20 03:04


Additional Labs: 


                                   Accuchecks











  11/08/20 11/08/20 11/07/20





  13:07 04:12 20:57


 


POC Glucose  175 H  283 H  256 H














  11/07/20





  17:31


 


POC Glucose  295 H











Radiology Reviewed by me: Yes


EKG Reviewed by me: Yes





Hospitalist ROS





- Medication


Medications: 


Active Medications











Generic Name Dose Route Start Last Admin





  Trade Name Freq  PRN Reason Stop Dose Admin


 


Acetaminophen  650 mg  10/12/20 14:11  10/17/20 22:18





  Acetaminophen 325 Mg Tab  PO   650 mg





  Q4H PRN   Administration





  Headache/Fever/Mild Pain (1-3)  


 


Amlodipine Besylate  5 mg  10/13/20 09:00  11/08/20 08:56





  Amlodipine 5 Mg Tab  PO   5 mg





  BID SAIDA   Administration


 


Ascorbic Acid  1,000 mg  10/15/20 21:00  11/08/20 08:56





  Ascorbic Acid 500 Mg Chewable Tablet  PO   1,000 mg





  BID SAIDA   Administration


 


Brimonidine Tartrate  1 drop  10/13/20 09:00  11/08/20 08:58





  Brimonidine Tartrate 0.2% Ophth Soln 5 Ml Bottle  R EYE   1 drop





  BID SAIDA   Administration


 


Calcitriol  0.25 mcg  10/13/20 09:00  11/08/20 08:56





  Calcitriol 0.25 Mcg Cap  PO   0.25 mcg





  DAILY SAIDA   Administration


 


Cholecalciferol  1,000 units  10/13/20 09:00  11/08/20 09:04





  Cholecalciferol 1,000 Units (25 Mcg) Tab  PO   1,000 units





  DAILY SAIDA   Administration


 


Dextrose/Water  25 gm  10/12/20 14:15  11/01/20 17:30





  Dextrose 50% Abboject 50 Ml Syringe  SLOW IVP   25 gm





  PRN PRN   Administration





  Hypoglycemia  


 


Diphenhydramine HCl  25 mg  10/14/20 04:24  10/20/20 20:11





  Diphenhydramine 25 Mg Cap  PO   25 mg





  HSPRN PRN   Administration





  Insomnia  


 


Dorzolamide/Timolol  1 drop  10/13/20 09:00  11/08/20 08:58





  Dorzolamide/Timolol 2%/0.5% Ophth Soln 10 Ml Bottle  EA EYE   1 each





  TID SAIDA   Administration


 


Fondaparinux  5 mg  11/04/20 06:00  11/08/20 05:16





  Fondaparinux Sodium 2.5 Mg/0.5 Ml Syringe  SC   5 mg





  DAILY@0600 SAIDA   Administration


 


Guaifenesin  600 mg  11/03/20 13:00  11/08/20 08:59





  Diabetic Tussin 200 Mg/10 Ml Udcup  PER TUBE   600 mg





  Q4HR SAIDA   Administration


 


Hydralazine HCl  50 mg  10/13/20 09:00  11/08/20 08:55





  Hydralazine 25 Mg Tab  PO   50 mg





  TID SAIDA   Administration


 


Dextrose/Water  1,000 mls @ 0 mls/hr  10/12/20 14:15  11/02/20 02:43





  D5w  IV   1,000 mls





  .Q0M PRN   Administration





  Hypoglycemia  





  As Directed  


 


Nicardipine HCl 50 mg/ Sodium  40 mg in 250 mls @ 0 mls/hr  11/03/20 13:30  

11/04/20 10:29





  Chloride  IVPB   250 mls





  INF SAIDA   Administration





  Protocol  





  Titrate  


 


Insulin Glargine 15 units/  0.15 mls @ 0 mls/hr  11/07/20 21:00  11/07/20 20:56





  Miscellaneous Medication  SC   0.15 mls





  HS SAIDA   Administration


 


Insulin Human Lispro  0 units  10/12/20 21:21  11/07/20 17:35





  Humalog 300 Units/3 Ml Vial  SC   3 unit





  .BEDTIME SLIDING SC PRN   Administration





  Bedtime Correctional Scale  


 


Insulin Human Lispro  0 units  10/28/20 10:45  11/08/20 05:24





  Humalog 300 Units/3 Ml Vial  SC   6 unit





  .MODERATE SLIDING SC PRN   Administration





  MODERATE SLIDING SCALE  





  Protocol  


 


Isosorbide Dinitrate  20 mg  10/22/20 21:00  11/08/20 09:04





  Isosorbide Dinitrate 20 Mg Tab  PO   20 mg





  BID SAIDA   Administration


 


Labetalol HCl  10 mg  10/12/20 14:18  11/08/20 00:11





  Labetalol Hcl 100 Mg/20 Ml Vial  SLOW IVP   10 mg





  Q4H PRN   Administration





  SBP Greater Than 180  


 


Lactulose  30 gm  10/29/20 09:00  11/06/20 10:29





  Lactulose 20 Gm/30 Ml Udcup  PO   Not Given





  Q2D SAIDA  


 


Levothyroxine Sodium  25 mcg  10/17/20 06:00  11/08/20 05:17





  Levothyroxine Sodium 25 Mcg Tab  PO   25 mcg





  0600 SAIDA   Administration


 


Lorazepam  2 mg  11/02/20 14:53  11/07/20 09:31





  Lorazepam 2 Mg/Ml Vial  SLOW IVP  12/02/20 14:54  2 mg





  Q1H PRN   Administration





  Breakthrough agitation  


 


Methylprednisolone Sodium Succinate  40 mg  10/17/20 21:00  11/08/20 08:54





  Methylprednisolone Sod Succ 40 Mg Vial  IVP   40 mg





  Q12HR SAIDA   Administration


 


Pantoprazole Sodium  40 mg  10/27/20 09:00  11/08/20 08:55





  Pantoprazole 40 Mg Vial  IVP   40 mg





  DAILY SAIDA   Administration


 


Propylene Glycol  1 drop  10/13/20 09:00  11/08/20 09:04





  Polyethylene Glycol Opth Drop 15 Ml Bot  EA EYE   1 each





  BID SAIDA   Administration


 


Scopolamine  1.5 mg  10/25/20 11:00  11/06/20 13:32





  Scopolamine 1.5 Mg/72 Hour Patch  TD   1.5 mg





  Q3D SAIDA   Administration


 


Simvastatin  10 mg  10/19/20 21:00  11/07/20 20:57





  Simvastatin 10 Mg Tab  PO   10 mg





  2100 SAIDA   Administration


 


Sodium Chloride  10 ml  10/13/20 09:00  11/07/20 20:58





  Flush - Normal Saline 10 Ml Syringe  IVF   10 ml





  Q12HR SAIDA   Administration


 


Sodium Polystyrene Sulfonate  30 gm  10/29/20 09:00  11/06/20 10:29





  Sodium Polystyrene Sulfonate 15 Gm/60 Ml Bot  PO   Not Given





  Q2D SAIDA  


 


Sterile Water  1 ml  10/17/20 11:00  11/07/20 20:57





  Bacteriostatic Water 30 Ml Vial  FS   1 ml





  PRN PRN   Administration





  RECONSTITUTION  


 


Vecuronium Bromide  10 mg  10/23/20 11:13  11/08/20 13:50





  Vecuronium 10 Mg Vial  IV   10 mg





  Q1H PRN   Administration





   SEDATION  


 


Zinc Sulfate  220 mg  10/16/20 09:00  11/08/20 08:56





  Zinc Sulfate 220 Mg Cap  PO   220 mg





  DAILY SAIDA   Administration














- Exam


General - other findings: intubated


Eye: PERRL


ENT: normocephalic atraumatic


Neck: supple


Heart: RRR


Respiratory: CTAB


Gastrointestinal: soft


Extremities: no cyanosis, 2+ LE edema


Neurological - other findings: intubated, unresponive 





Hosp [facility]





- Plan





Patient is a pleasant 64 years old female with multiple comorbidities including 

diabetes type 2, hypertension, chronic systolic heart failure, hypertension who 

was diagnosed with Covid approximately 3 days ago, presented to ED with 

worsening dyspnea.





Acute hypoxic respiratory failure secondary to COVID-19


--further management as per pulmonology


--intubated since 10/23. Will likely need trach and peg soon as family wants 

aggressive cares. 





COVID-19 PNA


--mgt above. s/p covalescent plasma. cont steroid





Acute toxic encephalopathy 


--remains unresponsive even after sedations weaned off


--will consult neurology on Monday





Bilateral pneumothoraces


--s/p devin chest tube placement, mgt as per surgery 





RAMONA on CKD stage III


--now on HD since 10/29. Further mgt as per Dr. Mcnamara





Anemia of chronic kidney disease


--s/p transfusions. Follow CBC. Keep Hb above 7





Metabolic Acidosis


--resolved 





Diabetes type 2


--BG elevated d/t steroid. Incr Lantus, cont ISS





Hypothyroidism


--cont Levothyroxine 





Klebsiella UTI, poa


--completed abx 





DVT ppx: Arixtra


GI ppx: PPI


Code Status: Full code


Anticipated Dispo: Home when medically stable





I have updated the daughter,
- Subjective


Subjective: 


Pt was seen and examined. Attempted to call her daughter, Mindy but no response.

Nurse staff reported, pt open her eye. Still remains encephalopathic on my exam 





- Objective


Vital Signs & Weight: 


                             Vital Signs (12 hours)











  Temp Pulse Resp BP Pulse Ox


 


 11/11/20 12:00  100.2 F H   22 H  


 


 11/11/20 11:01   81   157/52 H 


 


 11/11/20 10:00    26 H  


 


 11/11/20 08:39   83   190/59 H 


 


 11/11/20 08:37   92   190/59 H 


 


 11/11/20 08:00  100.6 F H   28 H   96


 


 11/11/20 07:35   83   162/51 H 


 


 11/11/20 06:00    26 H  


 


 11/11/20 04:00  98.7 F   26 H  


 


 11/11/20 02:40   75   155/50 H 


 


 11/11/20 02:00    26 H  








                                     Weight











Admit Weight                   176 lb 8 oz


 


Weight                         175 lb 14.862 oz











                            Most Recent Monitor Data











Heart Rate from ECG            72


 


NIBP                           149/46


 


NIBP BP-Mean                   80


 


Respiration from ECG           24


 


SpO2                           98














I&O: 


                                        











 11/10/20 11/11/20 11/12/20





 06:59 06:59 06:59


 


Intake Total 1969 1616 100


 


Output Total 1210 1834 95


 


Balance 759 -218 5











Result Diagrams: 


                                 11/11/20 04:00





                                 11/11/20 04:00


Additional Labs: 


                                   Accuchecks











  11/11/20 11/11/20 11/10/20





  11:30 04:06 21:03


 


POC Glucose  274 H  306 H  150 H














  11/10/20 11/10/20





  16:59 16:13


 


POC Glucose  72  244 H











Radiology Reviewed by me: Yes


EKG Reviewed by me: Yes





Hospitalist ROS





- Medication


Medications: 


Active Medications











Generic Name Dose Route Start Last Admin





  Trade Name Freq  PRN Reason Stop Dose Admin


 


Acetaminophen  650 mg  10/12/20 14:11  10/17/20 22:18





  Acetaminophen 325 Mg Tab  PO   650 mg





  Q4H PRN   Administration





  Headache/Fever/Mild Pain (1-3)  


 


Amlodipine Besylate  5 mg  10/13/20 09:00  11/11/20 08:39





  Amlodipine 5 Mg Tab  PO   5 mg





  BID SAIDA   Administration


 


Ascorbic Acid  1,000 mg  10/15/20 21:00  11/11/20 08:39





  Ascorbic Acid 500 Mg Chewable Tablet  PO   1,000 mg





  BID SAIDA   Administration


 


Brimonidine Tartrate  1 drop  10/13/20 09:00  11/11/20 08:45





  Brimonidine Tartrate 0.2% Ophth Soln 5 Ml Bottle  R EYE   1 drop





  BID SAIDA   Administration


 


Calcitriol  0.25 mcg  10/13/20 09:00  11/11/20 08:39





  Calcitriol 0.25 Mcg Cap  PO   0.25 mcg





  DAILY SAIDA   Administration


 


Cholecalciferol  1,000 units  10/13/20 09:00  11/11/20 08:52





  Cholecalciferol 1,000 Units (25 Mcg) Tab  PO   1,000 units





  DAILY SAIDA   Administration


 


Dextrose/Water  25 gm  10/12/20 14:15  11/01/20 17:30





  Dextrose 50% Abboject 50 Ml Syringe  SLOW IVP   25 gm





  PRN PRN   Administration





  Hypoglycemia  


 


Diphenhydramine HCl  25 mg  10/14/20 04:24  11/10/20 12:21





  Diphenhydramine 25 Mg Cap  PO   25 mg





  HSPRN PRN   Administration





  Insomnia  


 


Dorzolamide/Timolol  1 drop  10/13/20 09:00  11/11/20 08:46





  Dorzolamide/Timolol 2%/0.5% Ophth Soln 10 Ml Bottle  EA EYE   1 each





  TID SAIDA   Administration


 


Guaifenesin  600 mg  11/03/20 13:00  11/11/20 13:08





  Diabetic Tussin 200 Mg/10 Ml Udcup  PER TUBE   600 mg





  Q4HR SAIDA   Administration


 


Hydralazine HCl  50 mg  10/13/20 09:00  11/11/20 08:39





  Hydralazine 25 Mg Tab  PO   50 mg





  TID SAIDA   Administration


 


Dextrose/Water  1,000 mls @ 0 mls/hr  10/12/20 14:15  11/02/20 02:43





  D5w  IV   1,000 mls





  .Q0M PRN   Administration





  Hypoglycemia  





  As Directed  


 


Nicardipine HCl 50 mg/ Sodium  40 mg in 250 mls @ 0 mls/hr  11/03/20 13:30  

11/10/20 22:19





  Chloride  IVPB   250 mls





  INF SAIDA   Administration





  Protocol  





  Titrate  


 


Insulin Glargine 15 units/  0.15 mls @ 0 mls/hr  11/07/20 21:00  11/10/20 20:54





  Miscellaneous Medication  SC   0.15 mls





  HS SAIDA   Administration


 


Insulin Human Lispro  0 units  10/12/20 21:21  11/08/20 21:10





  Humalog 300 Units/3 Ml Vial  SC   2 unit





  .BEDTIME SLIDING SC PRN   Administration





  Bedtime Correctional Scale  


 


Insulin Human Lispro  0 units  10/28/20 10:45  11/11/20 12:27





  Humalog 300 Units/3 Ml Vial  SC   6 unit





  .MODERATE SLIDING SC PRN   Administration





  MODERATE SLIDING SCALE  





  Protocol  


 


Isosorbide Dinitrate  20 mg  10/22/20 21:00  11/11/20 08:52





  Isosorbide Dinitrate 20 Mg Tab  PO   20 mg





  BID SAIDA   Administration


 


Labetalol HCl  10 mg  10/12/20 14:18  11/11/20 08:37





  Labetalol Hcl 100 Mg/20 Ml Vial  SLOW IVP   10 mg





  Q4H PRN   Administration





  SBP Greater Than 180  


 


Levothyroxine Sodium  25 mcg  10/17/20 06:00  11/11/20 05:54





  Levothyroxine Sodium 25 Mcg Tab  PO   25 mcg





  0600 SAIDA   Administration


 


Lorazepam  2 mg  11/02/20 14:53  11/10/20 21:29





  Lorazepam 2 Mg/Ml Vial  SLOW IVP  12/02/20 14:54  2 mg





  Q1H PRN   Administration





  Breakthrough agitation  


 


Methylprednisolone Sodium Succinate  40 mg  10/17/20 21:00  11/11/20 08:39





  Methylprednisolone Sod Succ 40 Mg Vial  IVP   40 mg





  Q12HR SAIDA   Administration


 


Metoprolol Tartrate  50 mg  11/09/20 09:00  11/11/20 08:40





  Metoprolol Tartrate 50 Mg Tab  PO   50 mg





  BID SAIDA   Administration


 


Pantoprazole Sodium  40 mg  10/27/20 09:00  11/11/20 08:39





  Pantoprazole 40 Mg Vial  IVP   40 mg





  DAILY SAIDA   Administration


 


Propylene Glycol  1 drop  10/13/20 09:00  11/11/20 08:46





  Polyethylene Glycol Opth Drop 15 Ml Bot  EA EYE   1 each





  BID SAIDA   Administration


 


Scopolamine  1.5 mg  10/25/20 11:00  11/09/20 11:10





  Scopolamine 1.5 Mg/72 Hour Patch  TD   1.5 mg





  Q3D SAIDA   Administration


 


Simvastatin  10 mg  10/19/20 21:00  11/10/20 20:54





  Simvastatin 10 Mg Tab  PO   10 mg





  2100 SAIDA   Administration


 


Sodium Chloride  10 ml  10/13/20 09:00  11/11/20 08:47





  Flush - Normal Saline 10 Ml Syringe  IVF   10 ml





  Q12HR SAIDA   Administration


 


Sodium Polystyrene Sulfonate  30 gm  10/29/20 09:00  11/10/20 09:18





  Sodium Polystyrene Sulfonate 15 Gm/60 Ml Bot  PO   Not Given





  Q2D SAIDA  


 


Sterile Water  1 ml  10/17/20 11:00  11/10/20 09:48





  Bacteriostatic Water 30 Ml Vial  FS   1 ml





  PRN PRN   Administration





  RECONSTITUTION  


 


Vecuronium Bromide  10 mg  10/23/20 11:13  11/10/20 21:31





  Vecuronium 10 Mg Vial  IV   10 mg





  Q1H PRN   Administration





   SEDATION  


 


Zinc Sulfate  220 mg  10/16/20 09:00  11/11/20 08:39





  Zinc Sulfate 220 Mg Cap  PO   220 mg





  DAILY SAIDA   Administration














- Exam


General - other findings: intubated


Eye: PERRL


ENT: normocephalic atraumatic


Neck: supple


Heart: RRR


Respiratory: rhonchi


Gastrointestinal: soft


Extremities: no cyanosis


Skin: normal turgor


Neurological - other findings: intuated 





Hosp A/[facility]





- Plan





Patient is a pleasant 64 years old female with multiple comorbidities including 

diabetes type 2, hypertension, chronic systolic heart failure, hypertension who 

was diagnosed with Covid approximately 3 days ago, presented to ED with 

worsening dyspnea.





Acute hypoxic respiratory failure secondary to COVID-19


--further management as per pulmonology


--intubated since 10/23. Trach and Peg placement plans for this Firday 


--cont supportive cares, attempted to update her daughter, Mindy CANTRELL-19 PNA


--mgt above. s/p covalescent plasma. cont steroid. Isolation was discontinued. 





Acute toxic encephalopathy 


--remains unresponsive even after sedations weaned off


--EEG no evidence of seizure. likely related to COVID-19


--CT head negative 





Bilateral pneumothoraces


--s/p devin chest tube placement, mgt as per surgery 





RAMONA on CKD stage III


--now on HD since 10/29. Further mgt as per Dr. Mcnamara





Anemia of chronic kidney disease


--s/p transfusions. Follow CBC. Keep Hb above 7





Metabolic Acidosis


--resolved 





Diabetes type 2


--BG elevated d/t steroid. Incr Lantus, cont ISS





HTN


--cont Metoprolol 





Hypothyroidism


--cont Levothyroxine 





Klebsiella UTI, poa


--completed abx 





DVT ppx: Arixtra


GI ppx: PPI


Code Status: Full code


Anticipated Dispo: Home when medically stable
- Subjective


Subjective: 


d/w nursing staff, no acute changes. Remains intubated





- Objective


Vital Signs & Weight: 


                             Vital Signs (12 hours)











  Temp Pulse Resp BP Pulse Ox


 


 11/12/20 16:00  98.3 F   22 H  


 


 11/12/20 15:21   60   112/52 L 


 


 11/12/20 14:37   65   148/58 H 


 


 11/12/20 14:00    22 H  


 


 11/12/20 12:00  98.4 F   26 H  


 


 11/12/20 11:08   79   125/61 


 


 11/12/20 10:00    26 H  


 


 11/12/20 08:47   103 H   132/70 


 


 11/12/20 08:26   103 H   170/81 H 


 


 11/12/20 08:00    26 H  


 


 11/12/20 07:04      98


 


 11/12/20 07:00  98.8 F    


 


 11/12/20 06:00    22 H  








                                     Weight











Admit Weight                   176 lb 8 oz


 


Weight                         176 lb 9.444 oz











                            Most Recent Monitor Data











Heart Rate from ECG            71


 


NIBP                           158/56


 


NIBP BP-Mean                   90


 


Respiration from ECG           26


 


SpO2                           100














I&O: 


                                        











 11/11/20 11/12/20 11/13/20





 06:59 06:59 06:59


 


Intake Total 1616 892 390


 


Output Total 1834 922 190


 


Balance -218 -30 200











Result Diagrams: 


                                 11/12/20 04:11





                                 11/12/20 04:11


Additional Labs: 


                                   Accuchecks











  11/12/20 11/12/20 11/11/20





  15:45 10:23 20:38


 


POC Glucose  161 H  193 H  103 H











Radiology Reviewed by me: Yes


EKG Reviewed by me: Yes





Hospitalist ROS





- Medication


Medications: 


Active Medications











Generic Name Dose Route Start Last Admin





  Trade Name Freq  PRN Reason Stop Dose Admin


 


Acetaminophen  650 mg  10/12/20 14:11  10/17/20 22:18





  Acetaminophen 325 Mg Tab  PO   650 mg





  Q4H PRN   Administration





  Headache/Fever/Mild Pain (1-3)  


 


Amlodipine Besylate  5 mg  10/13/20 09:00  11/12/20 08:47





  Amlodipine 5 Mg Tab  PO   5 mg





  BID SAIDA   Administration


 


Ascorbic Acid  1,000 mg  10/15/20 21:00  11/12/20 08:47





  Ascorbic Acid 500 Mg Chewable Tablet  PO   1,000 mg





  BID SAIDA   Administration


 


Brimonidine Tartrate  1 drop  10/13/20 09:00  11/12/20 08:49





  Brimonidine Tartrate 0.2% Ophth Soln 5 Ml Bottle  R EYE   1 drop





  BID SAIDA   Administration


 


Calcitriol  0.25 mcg  10/13/20 09:00  11/12/20 08:48





  Calcitriol 0.25 Mcg Cap  PO   0.25 mcg





  DAILY SAIDA   Administration


 


Cholecalciferol  1,000 units  10/13/20 09:00  11/12/20 08:51





  Cholecalciferol 1,000 Units (25 Mcg) Tab  PO   1,000 units





  DAILY SAIDA   Administration


 


Dextrose/Water  25 gm  10/12/20 14:15  11/01/20 17:30





  Dextrose 50% Abboject 50 Ml Syringe  SLOW IVP   25 gm





  PRN PRN   Administration





  Hypoglycemia  


 


Diphenhydramine HCl  25 mg  10/14/20 04:24  11/10/20 12:21





  Diphenhydramine 25 Mg Cap  PO   25 mg





  HSPRN PRN   Administration





  Insomnia  


 


Dorzolamide/Timolol  1 drop  10/13/20 09:00  11/12/20 14:46





  Dorzolamide/Timolol 2%/0.5% Ophth Soln 10 Ml Bottle  EA EYE   1 each





  TID SAIDA   Administration


 


Guaifenesin  600 mg  11/03/20 13:00  11/12/20 16:39





  Diabetic Tussin 200 Mg/10 Ml Udcup  PER TUBE   600 mg





  Q4HR SAIDA   Administration


 


Hydralazine HCl  50 mg  10/13/20 09:00  11/12/20 14:37





  Hydralazine 25 Mg Tab  PO   50 mg





  TID SAIDA   Administration


 


Dextrose/Water  1,000 mls @ 0 mls/hr  10/12/20 14:15  11/02/20 02:43





  D5w  IV   1,000 mls





  .Q0M PRN   Administration





  Hypoglycemia  





  As Directed  


 


Nicardipine HCl 50 mg/ Sodium  40 mg in 250 mls @ 0 mls/hr  11/03/20 13:30  

11/10/20 22:19





  Chloride  IVPB   250 mls





  INF SAIDA   Administration





  Protocol  





  Titrate  


 


Insulin Glargine 15 units/  0.15 mls @ 0 mls/hr  11/07/20 21:00  11/11/20 20:42





  Miscellaneous Medication  SC   Not Given





  HS [facility]  


 


Insulin Human Lispro  0 units  10/12/20 21:21  11/08/20 21:10





  Humalog 300 Units/3 Ml Vial  SC   2 unit





  .BEDTIME SLIDING SC PRN   Administration





  Bedtime Correctional Scale  


 


Insulin Human Lispro  0 units  10/28/20 10:45  11/12/20 15:55





  Humalog 300 Units/3 Ml Vial  SC   2 unit





  .MODERATE SLIDING SC PRN   Administration





  MODERATE SLIDING SCALE  





  Protocol  


 


Isosorbide Dinitrate  20 mg  10/22/20 21:00  11/12/20 08:48





  Isosorbide Dinitrate 20 Mg Tab  PO   20 mg





  BID SAIDA   Administration


 


Labetalol HCl  10 mg  10/12/20 14:18  11/12/20 03:05





  Labetalol Hcl 100 Mg/20 Ml Vial  SLOW IVP   10 mg





  Q4H PRN   Administration





  SBP Greater Than 180  


 


Levothyroxine Sodium  25 mcg  10/17/20 06:00  11/12/20 05:21





  Levothyroxine Sodium 25 Mcg Tab  PO   25 mcg





  0600 SAIDA   Administration


 


Methylprednisolone Sodium Succinate  40 mg  10/17/20 21:00  11/12/20 08:51





  Methylprednisolone Sod Succ 40 Mg Vial  IVP   40 mg





  Q12HR SAIDA   Administration


 


Metoprolol Tartrate  50 mg  11/09/20 09:00  11/12/20 08:48





  Metoprolol Tartrate 50 Mg Tab  PO   50 mg





  BID SAIDA   Administration


 


Pantoprazole Sodium  40 mg  10/27/20 09:00  11/12/20 08:51





  Pantoprazole 40 Mg Vial  IVP   40 mg





  DAILY SAIDA   Administration


 


Propylene Glycol  1 drop  10/13/20 09:00  11/12/20 08:49





  Polyethylene Glycol Opth Drop 15 Ml Bot  EA EYE   1 each





  BID SAIDA   Administration


 


Scopolamine  1.5 mg  10/25/20 11:00  11/12/20 11:14





  Scopolamine 1.5 Mg/72 Hour Patch  TD   1.5 mg





  Q3D SAIDA   Administration


 


Simvastatin  10 mg  10/19/20 21:00  11/11/20 20:40





  Simvastatin 10 Mg Tab  PO   10 mg





  2100 SAIDA   Administration


 


Sodium Chloride  10 ml  10/13/20 09:00  11/12/20 09:08





  Flush - Normal Saline 10 Ml Syringe  IVF   10 ml





  Q12HR SAIDA   Administration


 


Sodium Polystyrene Sulfonate  30 gm  10/29/20 09:00  11/12/20 09:08





  Sodium Polystyrene Sulfonate 15 Gm/60 Ml Bot  PO   Not Given





  Q2D SAIDA  


 


Sterile Water  1 ml  10/17/20 11:00  11/10/20 09:48





  Bacteriostatic Water 30 Ml Vial  FS   1 ml





  PRN PRN   Administration





  RECONSTITUTION  


 


Vecuronium Bromide  10 mg  10/23/20 11:13  11/10/20 21:31





  Vecuronium 10 Mg Vial  IV   10 mg





  Q1H PRN   Administration





   SEDATION  


 


Zinc Sulfate  220 mg  10/16/20 09:00  11/12/20 08:47





  Zinc Sulfate 220 Mg Cap  PO   220 mg





  DAILY SAIDA   Administration














- Exam


General - other findings: intubated


Eye: PERRL


ENT: normocephalic atraumatic


Neck: supple, symmetric


Heart: RRR, no murmur


Respiratory: rhonchi


Gastrointestinal: soft


Extremities: no cyanosis


Skin: normal turgor


Neurological - other findings: intubated





Hosp A/[facility]





- Plan





Patient is a pleasant 64 years old female with multiple comorbidities including 

diabetes type 2, hypertension, chronic systolic heart failure, hypertension who 

was diagnosed with Covid approximately 3 days ago, presented to ED with worsenin

g dyspnea.





Acute hypoxic respiratory failure secondary to COVID-19


--further management as per pulmonology


--intubated since 10/23. Trach and Peg placement plans for tomorrow


--cont supportive cares





COVID-19 PNA


--mgt above. s/p covalescent plasma. cont steroid. Isolation was discontinued. 





Acute toxic encephalopathy 


--remains unresponsive even after sedations weaned off


--EEG no evidence of seizure. likely related to COVID-19


--CT head negative 





Bilateral pneumothoraces


--s/p devin chest tube placement, mgt as per surgery 





RAMONA on CKD stage III


--now on HD since 10/29. Further mgt as per Dr. Mcnamara





Anemia of chronic kidney disease


--s/p transfusions. Follow CBC. Keep Hb above 7





Metabolic Acidosis


--resolved 





Diabetes type 2


--BG elevated d/t steroid. Incr Lantus, cont ISS





HTN


--cont Metoprolol 





Hypothyroidism


--cont Levothyroxine 





Klebsiella UTI, poa


--completed abx 





DVT ppx: Arixtra


GI ppx: PPI


Code Status: Full code


Anticipated Dispo: Home when medically stable
- Subjective


Subjective: 


no new changes. Pt was seen and examined, discussed Dr. Rascon, appreciate his 

help





- Objective


Vital Signs & Weight: 


                             Vital Signs (12 hours)











  Temp Pulse Resp BP


 


 11/10/20 16:15   78   141/78 H


 


 11/10/20 15:07   79   132/44 L


 


 11/10/20 12:35   100  


 


 11/10/20 12:00  98.5 F   26 H 


 


 11/10/20 11:12   74  


 


 11/10/20 10:00    27 H 


 


 11/10/20 09:12   80   160/53 H


 


 11/10/20 09:11   79   160/53 H


 


 11/10/20 08:00  98.7 F   25 H 


 


 11/10/20 07:44   81   159/49 H


 


 11/10/20 06:00    26 H 








                                     Weight











Admit Weight                   176 lb 8 oz


 


Weight                         180 lb 5.41 oz











                            Most Recent Monitor Data











Heart Rate from ECG            77


 


NIBP                           136/43


 


NIBP BP-Mean                   74


 


Respiration from ECG           27


 


SpO2                           90














I&O: 


                                        











 11/09/20 11/10/20 11/11/20





 06:59 06:59 06:59


 


Intake Total 1805 1969 210


 


Output Total 2146 1210 362


 


Balance -341 759 -152











Result Diagrams: 


                                 11/10/20 03:55





                                 11/10/20 03:55


Additional Labs: 


                                   Accuchecks











  11/10/20 11/10/20 11/09/20





  10:40 04:02 21:31


 


POC Glucose  147 H  237 H  206 H














  11/09/20





  17:21


 


POC Glucose  185 H











Radiology Reviewed by me: Yes


EKG Reviewed by me: Yes





Hospitalist ROS





- Medication


Medications: 


Active Medications











Generic Name Dose Route Start Last Admin





  Trade Name Freq  PRN Reason Stop Dose Admin


 


Acetaminophen  650 mg  10/12/20 14:11  10/17/20 22:18





  Acetaminophen 325 Mg Tab  PO   650 mg





  Q4H PRN   Administration





  Headache/Fever/Mild Pain (1-3)  


 


Amlodipine Besylate  5 mg  10/13/20 09:00  11/10/20 09:12





  Amlodipine 5 Mg Tab  PO   5 mg





  BID SAIDA   Administration


 


Ascorbic Acid  1,000 mg  10/15/20 21:00  11/10/20 09:12





  Ascorbic Acid 500 Mg Chewable Tablet  PO   1,000 mg





  BID SAIDA   Administration


 


Brimonidine Tartrate  1 drop  10/13/20 09:00  11/10/20 09:19





  Brimonidine Tartrate 0.2% Ophth Soln 5 Ml Bottle  R EYE   1 drop





  BID SAIDA   Administration


 


Calcitriol  0.25 mcg  10/13/20 09:00  11/10/20 09:11





  Calcitriol 0.25 Mcg Cap  PO   0.25 mcg





  DAILY SAIDA   Administration


 


Cholecalciferol  1,000 units  10/13/20 09:00  11/10/20 09:11





  Cholecalciferol 1,000 Units (25 Mcg) Tab  PO   1,000 units





  DAILY SAIDA   Administration


 


Dextrose/Water  25 gm  10/12/20 14:15  11/01/20 17:30





  Dextrose 50% Abboject 50 Ml Syringe  SLOW IVP   25 gm





  PRN PRN   Administration





  Hypoglycemia  


 


Diphenhydramine HCl  25 mg  10/14/20 04:24  11/10/20 12:21





  Diphenhydramine 25 Mg Cap  PO   25 mg





  HSPRN PRN   Administration





  Insomnia  


 


Dorzolamide/Timolol  1 drop  10/13/20 09:00  11/10/20 16:14





  Dorzolamide/Timolol 2%/0.5% Ophth Soln 10 Ml Bottle  EA EYE   1 each





  TID SAIDA   Administration


 


Guaifenesin  600 mg  11/03/20 13:00  11/10/20 16:15





  Diabetic Tussin 200 Mg/10 Ml Udcup  PER TUBE   600 mg





  Q4HR SAIDA   Administration


 


Hydralazine HCl  50 mg  10/13/20 09:00  11/10/20 16:15





  Hydralazine 25 Mg Tab  PO   50 mg





  TID SAIDA   Administration


 


Dextrose/Water  1,000 mls @ 0 mls/hr  10/12/20 14:15  11/02/20 02:43





  D5w  IV   1,000 mls





  .Q0M PRN   Administration





  Hypoglycemia  





  As Directed  


 


Nicardipine HCl 50 mg/ Sodium  40 mg in 250 mls @ 0 mls/hr  11/03/20 13:30  

11/10/20 02:30





  Chloride  IVPB   250 mls





  INF SAIDA   Administration





  Protocol  





  Titrate  


 


Insulin Glargine 15 units/  0.15 mls @ 0 mls/hr  11/07/20 21:00  11/09/20 21:21





  Miscellaneous Medication  SC   0.15 mls





  HS SAIDA   Administration


 


Insulin Human Lispro  0 units  10/12/20 21:21  11/08/20 21:10





  Humalog 300 Units/3 Ml Vial  SC   2 unit





  .BEDTIME SLIDING SC PRN   Administration





  Bedtime Correctional Scale  


 


Insulin Human Lispro  0 units  10/28/20 10:45  11/10/20 16:16





  Humalog 300 Units/3 Ml Vial  SC   4 unit





  .MODERATE SLIDING SC PRN   Administration





  MODERATE SLIDING SCALE  





  Protocol  


 


Isosorbide Dinitrate  20 mg  10/22/20 21:00  11/10/20 09:12





  Isosorbide Dinitrate 20 Mg Tab  PO   20 mg





  BID SAIDA   Administration


 


Labetalol HCl  10 mg  10/12/20 14:18  11/09/20 17:40





  Labetalol Hcl 100 Mg/20 Ml Vial  SLOW IVP   10 mg





  Q4H PRN   Administration





  SBP Greater Than 180  


 


Levothyroxine Sodium  25 mcg  10/17/20 06:00  11/10/20 06:09





  Levothyroxine Sodium 25 Mcg Tab  PO   25 mcg





  0600 SAIDA   Administration


 


Lorazepam  2 mg  11/02/20 14:53  11/10/20 11:44





  Lorazepam 2 Mg/Ml Vial  SLOW IVP  12/02/20 14:54  2 mg





  Q1H PRN   Administration





  Breakthrough agitation  


 


Methylprednisolone Sodium Succinate  40 mg  10/17/20 21:00  11/10/20 09:13





  Methylprednisolone Sod Succ 40 Mg Vial  IVP   40 mg





  Q12HR SAIDA   Administration


 


Metoprolol Tartrate  50 mg  11/09/20 09:00  11/10/20 09:11





  Metoprolol Tartrate 50 Mg Tab  PO   50 mg





  BID SAIDA   Administration


 


Pantoprazole Sodium  40 mg  10/27/20 09:00  11/10/20 09:13





  Pantoprazole 40 Mg Vial  IVP   40 mg





  DAILY SAIDA   Administration


 


Propylene Glycol  1 drop  10/13/20 09:00  11/10/20 09:20





  Polyethylene Glycol Opth Drop 15 Ml Bot  EA EYE   1 each





  BID SAIDA   Administration


 


Scopolamine  1.5 mg  10/25/20 11:00  11/09/20 11:10





  Scopolamine 1.5 Mg/72 Hour Patch  TD   1.5 mg





  Q3D SAIDA   Administration


 


Simvastatin  10 mg  10/19/20 21:00  11/09/20 21:22





  Simvastatin 10 Mg Tab  PO   10 mg





  2100 SAIDA   Administration


 


Sodium Chloride  10 ml  10/13/20 09:00  11/10/20 09:18





  Flush - Normal Saline 10 Ml Syringe  IVF   10 ml





  Q12HR SAIDA   Administration


 


Sodium Polystyrene Sulfonate  30 gm  10/29/20 09:00  11/10/20 09:18





  Sodium Polystyrene Sulfonate 15 Gm/60 Ml Bot  PO   Not Given





  Q2D SAIDA  


 


Sterile Water  1 ml  10/17/20 11:00  11/10/20 09:48





  Bacteriostatic Water 30 Ml Vial  FS   1 ml





  PRN PRN   Administration





  RECONSTITUTION  


 


Vecuronium Bromide  10 mg  10/23/20 11:13  11/10/20 11:44





  Vecuronium 10 Mg Vial  IV   10 mg





  Q1H PRN   Administration





   SEDATION  


 


Zinc Sulfate  220 mg  10/16/20 09:00  11/10/20 09:12





  Zinc Sulfate 220 Mg Cap  PO   220 mg





  DAILY SAIDA   Administration














- Exam


General - other findings: intubated


Eye: PERRL


ENT: normocephalic atraumatic


Neck: supple


Heart: RRR


Respiratory: CTAB


Gastrointestinal: soft


Extremities: no cyanosis


Skin: normal turgor


Musculoskeletal - other findings: intubated





Hosp A/P





- Plan





Patient is a pleasant 64 years old female with multiple comorbidities including 

diabetes type 2, hypertension, chronic systolic heart failure, hypertension who 

was diagnosed with Covid approximately 3 days ago, presented to ED with 

worsening dyspnea.





Acute hypoxic respiratory failure secondary to COVID-19


--further management as per pulmonology


--intubated since 10/23. Trach and Peg placement plans for this Firday 


--cont supportive cares





COVID-19 PNA


--mgt above. s/p covalescent plasma. cont steroid. Isolation was discontinued. 





Acute toxic encephalopathy 


--remains unresponsive even after sedations weaned off


--EEG no evidence of seizure. likely related to COVID-19


--CT head negative 





Bilateral pneumothoraces


--s/p devin chest tube placement, mgt as per surgery 





RAMONA on CKD stage III


--now on HD since 10/29. Further mgt as per Dr. Mcnamara





Anemia of chronic kidney disease


--s/p transfusions. Follow CBC. Keep Hb above 7





Metabolic Acidosis


--resolved 





Diabetes type 2


--BG elevated d/t steroid. Incr Lantus, cont ISS





HTN


--cont Metoprolol 





Hypothyroidism


--cont Levothyroxine 





Klebsiella UTI, poa


--completed abx 





DVT ppx: Arixtra


GI ppx: PPI


Code Status: Full code


Anticipated Dispo: Home when medically stable
- Subjective


Subjective: 


patient has been examined at bedside.  Her Covid came back confirmed positive.  

Patient status post convalescent plasma, she is on Decadron.  Azithromycin was 

discontinued by ID.  She had no fever.  Patient is satting in the mid 90% on 4 

L.  Patient complained of cough.  Her breathing appears to be improved.  No 

fever.





- Objective


Vital Signs & Weight: 


                             Vital Signs (12 hours)











  Temp Pulse Resp BP BP Pulse Ox


 


 10/13/20 11:05  98 F  68  16   145/65 H  94 L


 


 10/13/20 08:00  98.5 F  67  20   165/70 H  93 L


 


 10/13/20 05:50      168/74 H 


 


 10/13/20 05:24   66   184/74 H  


 


 10/13/20 03:40  97.8 F  66  20   184/74 H  97








                                     Weight











Admit Weight                   176 lb 8 oz


 


Weight                         176 lb 8 oz














I&O: 


                                        











 10/12/20 10/13/20 10/14/20





 06:59 06:59 06:59


 


Intake Total  250 


 


Output Total  300 


 


Balance  -50 











Result Diagrams: 


                                 10/13/20 05:05





                                 10/13/20 05:05


Additional Labs: 


                                   Accuchecks











  10/13/20 10/12/20





  11:07 17:36


 


POC Glucose  322 H  245 H











Radiology Reviewed by me: Yes


EKG Reviewed by me: Yes





Hospitalist ROS





- Medication


Medications: 


Active Medications











Generic Name Dose Route Start Last Admin





  Trade Name Freq  PRN Reason Stop Dose Admin


 


Acetaminophen  650 mg  10/12/20 14:11  10/12/20 21:55





  Acetaminophen 325 Mg Tab  PO   650 mg





  Q4H PRN   Administration





  Headache/Fever/Mild Pain (1-3)  


 


Amlodipine Besylate  5 mg  10/13/20 09:00  10/13/20 07:53





  Amlodipine 5 Mg Tab  PO   5 mg





  BID SAIDA   Administration


 


Brimonidine Tartrate  1 drop  10/13/20 09:00  10/13/20 09:58





  Brimonidine Tartrate 0.2% Ophth Soln 5 Ml Bottle  R EYE   1 drop





  BID SAIDA   Administration


 


Calcitriol  0.25 mcg  10/13/20 09:00  10/13/20 07:54





  Calcitriol 0.25 Mcg Cap  PO   0.25 mcg





  DAILY SAIDA   Administration


 


Cholecalciferol  1,000 units  10/13/20 09:00  10/13/20 07:53





  Cholecalciferol 1,000 Units (25 Mcg) Tab  PO   1,000 units





  DAILY SAIDA   Administration


 


Dexamethasone  6 mg  10/13/20 09:00  10/13/20 07:52





  Dexamethasone 4 Mg/Ml Vial  SLOW IVP   6 mg





  DAILY SAIDA   Administration


 


Dorzolamide/Timolol  1 drop  10/13/20 09:00  10/13/20 09:57





  Dorzolamide/Timolol 2%/0.5% Ophth Soln 10 Ml Bottle  EA EYE   1 each





  TID SAIDA   Administration


 


Enoxaparin Sodium  30 mg  10/13/20 09:00  10/13/20 07:53





  Enoxaparin Sodium 30 Mg/0.3 Ml Syringe  SC   30 mg





  0900 SAIDA   Administration


 


Fish Oil  3,000 mg  10/13/20 09:00  10/13/20 07:52





  Fish Oil 1,000 Mg Cap  PO   3,000 mg





  DAILY SAIDA   Administration


 


Glipizide  5 mg  10/13/20 09:00  10/13/20 07:54





  Glipizide Xl 5 Mg Tablet  PO   5 mg





  BID SAIDA   Administration


 


Hydralazine HCl  50 mg  10/13/20 09:00  10/13/20 07:53





  Hydralazine 25 Mg Tab  PO   50 mg





  TID SAIDA   Administration


 


Insulin Glargine 45 units/  0.45 mls @ 0 mls/hr  10/13/20 09:00  10/13/20 10:03





  Miscellaneous Medication  SC   0.45 mls





  BID SAIDA   Administration


 


Insulin Human Lispro  0 units  10/12/20 14:15  10/13/20 11:31





  Humalog 300 Units/3 Ml Vial  SC   5 unit





  .MILD SLIDING SCALE PRN   Administration





  Mild Correctional Scale  


 


Insulin Human Lispro  0 units  10/12/20 21:21  10/12/20 21:55





  Humalog 300 Units/3 Ml Vial  SC   4 unit





  .BEDTIME SLIDING SC PRN   Administration





  Bedtime Correctional Scale  


 


Labetalol HCl  10 mg  10/12/20 14:18  10/13/20 05:24





  Labetalol Hcl 100 Mg/20 Ml Vial  SLOW IVP   2 ml





  Q4H PRN   Administration





  SBP Greater Than 180  


 


Levothyroxine Sodium  25 mcg  10/13/20 09:00  10/13/20 07:54





  Levothyroxine Sodium 25 Mcg Tab  PO   25 mcg





  DAILY SAIDA   Administration


 


Metoprolol Tartrate  50 mg  10/13/20 09:00  10/13/20 07:54





  Metoprolol Tartrate 50 Mg Tab  PO   50 mg





  BID SAIDA   Administration


 


Pantoprazole Sodium  40 mg  10/13/20 09:00  10/13/20 07:54





  Pantoprazole 40 Mg Tab  PO   40 mg





  DAILY SAIDA   Administration


 


Propylene Glycol  1 drop  10/13/20 09:00  10/13/20 09:57





  Polyethylene Glycol Opth Drop 15 Ml Bot  EA EYE   1 each





  BID SAIDA   Administration














- Exam


General Appearance: NAD


Eye: PERRL


ENT: normocephalic atraumatic


Neck: supple


Heart: RRR


Respiratory: rhonchi


Gastrointestinal: soft


Extremities: no cyanosis


Skin: normal turgor


Neurological: cranial nerve grossly intact


Musculoskeletal: normal tone


Psychiatric: normal affect, normal behavior, A&O x 3





Hosp A/P





- Plan





Patient is a pleasant 64 years old female with multiple comorbidities including 

diabetes type 2, hypertension, chronic systolic heart failure, hypertension who 

was diagnosed with Covid approximately 3 days ago, presented to ED with 

worsening dyspnea.





Acute hypoxic respiratory failure secondary to COVID-19


--cont O2 supplement, wean as tolerate. s/p convalescent plasma. cont Decadron


--Azithromycin was discontinued by ID. Not a candidate for Remdesivir d/t renal 

function 


--appreciate Dr. Whitney' input


--Follow daily labs





COVID-19 PNA


--mgt aabove. 





Diabetes type 2


--BG elevated d/t steroid. Resume Lantus, cont ISS





RAMONA on CKD stage III


--Monitor, avoid nephrotoxic agent. Follow BMP





Hypothyroidism


--cont Levothyroxine 





DVT ppx: Lovenox


GI ppx: PPI


Code Status: Full code


Anticipated Dispo: Home when medically stable
- Subjective


Subjective: 


pt had an episode of increased workup of breathing and desat, found to have 

malfunction HiFlo nasal canula. Quickly replaced, Os sat went back up to 100% on

high russel. Pt immediately felt better. 


CXR improved. CRP trending down 





- Objective


Vital Signs & Weight: 


                             Vital Signs (12 hours)











  Temp Pulse Pulse Ox


 


 10/16/20 16:00  98.3 F  


 


 10/16/20 14:50    94 L


 


 10/16/20 14:15   73 


 


 10/16/20 12:00  98.1 F  


 


 10/16/20 09:35   73 


 


 10/16/20 08:00  98.2 F   95


 


 10/16/20 06:00  98.1 F  








                                     Weight











Admit Weight                   176 lb 8 oz


 


Weight                         178 lb 6.4 oz











                            Most Recent Monitor Data











Heart Rate from ECG            64


 


NIBP                           142/52


 


NIBP BP-Mean                   82


 


Respiration from ECG           18


 


SpO2                           94














I&O: 


                                        











 10/15/20 10/16/20 10/17/20





 06:59 06:59 06:59


 


Intake Total 1230 1610 


 


Output Total 1350 1200 


 


Balance -120 410 











Result Diagrams: 


                                 10/16/20 03:40





                                 10/16/20 03:40


Additional Labs: 


                                   Accuchecks











  10/16/20 10/16/20 10/15/20





  14:33 05:40 20:07


 


POC Glucose  112 H  66 L  190 H











Radiology Reviewed by me: Yes


EKG Reviewed by me: Yes





Hospitalist ROS





- Medication


Medications: 


Active Medications











Generic Name Dose Route Start Last Admin





  Trade Name Freq  PRN Reason Stop Dose Admin


 


Acetaminophen  650 mg  10/12/20 14:11  10/15/20 23:49





  Acetaminophen 325 Mg Tab  PO   650 mg





  Q4H PRN   Administration





  Headache/Fever/Mild Pain (1-3)  


 


Amlodipine Besylate  5 mg  10/13/20 09:00  10/16/20 09:35





  Amlodipine 5 Mg Tab  PO   5 mg





  BID SAIDA   Administration


 


Ascorbic Acid  1,000 mg  10/15/20 21:00  10/16/20 09:37





  Ascorbic Acid 500 Mg Chewable Tablet  PO   1,000 mg





  BID SAIDA   Administration


 


Benzonatate  100 mg  10/13/20 09:28  10/14/20 08:40





  Benzonatate 100 Mg Cap  PO   100 mg





  Q4H PRN   Administration





  Cough  


 


Brimonidine Tartrate  1 drop  10/13/20 09:00  10/16/20 09:40





  Brimonidine Tartrate 0.2% Ophth Soln 5 Ml Bottle  R EYE   1 drop





  BID SAIDA   Administration


 


Calcitriol  0.25 mcg  10/13/20 09:00  10/16/20 09:35





  Calcitriol 0.25 Mcg Cap  PO   0.25 mcg





  DAILY SAIDA   Administration


 


Cholecalciferol  1,000 units  10/13/20 09:00  10/16/20 09:36





  Cholecalciferol 1,000 Units (25 Mcg) Tab  PO   Not Given





  DAILY SAIDA  


 


Cyclobenzaprine HCl  10 mg  10/16/20 12:19  10/16/20 14:44





  Cyclobenzaprine 10 Mg Tab  PO   10 mg





  TID PRN   Administration





  Muscle Spasm  


 


Dexamethasone  6 mg  10/13/20 09:00  10/16/20 09:36





  Dexamethasone 4 Mg/Ml Vial  SLOW IVP   6 mg





  DAILY SAIDA   Administration


 


Dorzolamide/Timolol  1 drop  10/13/20 09:00  10/16/20 14:14





  Dorzolamide/Timolol 2%/0.5% Ophth Soln 10 Ml Bottle  EA EYE   1 each





  TID SAIDA   Administration


 


Enoxaparin Sodium  30 mg  10/13/20 09:00  10/16/20 09:32





  Enoxaparin Sodium 30 Mg/0.3 Ml Syringe  SC   30 mg





  0900 SAIDA   Administration


 


Fish Oil  3,000 mg  10/13/20 09:00  10/16/20 09:35





  Fish Oil 1,000 Mg Cap  PO   3,000 mg





  DAILY SAIDA   Administration


 


Glipizide  5 mg  10/13/20 09:00  10/16/20 09:37





  Glipizide Xl 5 Mg Tablet  PO   5 mg





  BID SAIDA   Administration


 


Guaifenesin  600 mg  10/13/20 21:00  10/16/20 09:35





  Guaifenesin Er 600 Mg Tab  PO   600 mg





  Q12HR SAIDA   Administration


 


Hydralazine HCl  50 mg  10/13/20 09:00  10/16/20 14:15





  Hydralazine 25 Mg Tab  PO   50 mg





  TID SAIDA   Administration


 


Insulin Glargine 45 units/  0.45 mls @ 0 mls/hr  10/13/20 09:00  10/16/20 07:55





  Miscellaneous Medication  SC   Not Given





  BID SAIDA  


 


Azithromycin 500 mg/ Sodium  250 mls @ 250 mls/hr  10/14/20 10:00  10/16/20 

09:13





  Chloride  IVPB   250 mls





  Q24HR SAIDA   Administration


 


Cefepime HCl 1 gm/ Sodium  100 mls @ 200 mls/hr  10/14/20 21:00  10/16/20 09:13





  Chloride  IVPB   100 mls





  Q12HR SAIDA   Administration


 


Sodium Bicarbonate 150 meq/  1,150 mls @ 50 mls/hr  10/16/20 12:30  10/16/20 

14:14





  Dextrose/Water  IV  10/17/20 11:29  1,150 mls





  ONE ONE   Administration


 


Insulin Human Lispro  0 units  10/12/20 14:15  10/15/20 17:50





  Humalog 300 Units/3 Ml Vial  SC   2 unit





  .MILD SLIDING SCALE PRN   Administration





  Mild Correctional Scale  


 


Insulin Human Lispro  0 units  10/12/20 21:21  10/14/20 21:58





  Humalog 300 Units/3 Ml Vial  SC   2 unit





  .BEDTIME SLIDING SC PRN   Administration





  Bedtime Correctional Scale  


 


Labetalol HCl  10 mg  10/12/20 14:18  10/14/20 01:50





  Labetalol Hcl 100 Mg/20 Ml Vial  SLOW IVP   2 ml





  Q4H PRN   Administration





  SBP Greater Than 180  


 


Melatonin  3 mg  10/14/20 02:12  10/15/20 23:49





  Melatonin 3 Mg Tab  PO   3 mg





  HSPRN PRN   Administration





  Insomnia  


 


Metoprolol Tartrate  50 mg  10/13/20 09:00  10/16/20 09:38





  Metoprolol Tartrate 50 Mg Tab  PO   50 mg





  BID SAIDA   Administration


 


Pantoprazole Sodium  40 mg  10/13/20 09:00  10/16/20 09:36





  Pantoprazole 40 Mg Tab  PO   40 mg





  DAILY SAIDA   Administration


 


Polyvinyl Alcohol/Povidone  1 each  10/13/20 09:00  10/16/20 09:37





  Polyvinyl Alcohol 1.4%/Povidone 0.6% Opth Drops  EA EYE   1 each





  DAILY SAIDA   Administration


 


Propylene Glycol  1 drop  10/13/20 09:00  10/16/20 09:40





  Polyethylene Glycol Opth Drop 15 Ml Bot  EA EYE   1 each





  BID SAIDA   Administration


 


Simvastatin  10 mg  10/13/20 17:00  10/15/20 17:40





  Simvastatin 10 Mg Tab  PO   10 mg





  QPM-WM SAIDA   Administration


 


Sodium Bicarbonate  650 mg  10/16/20 15:00  10/16/20 14:14





  Sodium Bicarbonate Tab 325 Mg Tab  PO   650 mg





  TID SAIDA   Administration


 


Sodium Chloride  10 ml  10/13/20 09:00  10/16/20 09:41





  Flush - Normal Saline 10 Ml Syringe  IVF   10 ml





  Q12HR SAIDA   Administration


 


Zinc Sulfate  220 mg  10/16/20 09:00  10/16/20 09:41





  Zinc Sulfate 220 Mg Cap  PO   220 mg





  DAILY SAIDA   Administration














- Exam


General Appearance: NAD


Eye: PERRL


ENT: normocephalic atraumatic


Neck: supple


Heart: RRR


Respiratory: rhonchi


Gastrointestinal: soft


Extremities: no cyanosis


Skin: normal turgor


Neurological: cranial nerve grossly intact


Musculoskeletal: normal tone


Psychiatric: normal affect, normal behavior, A&O x 3





[facility]





- Plan





Patient is a pleasant 64 years old female with multiple comorbidities including 

diabetes type 2, hypertension, chronic systolic heart failure, hypertension who 

was diagnosed with Covid approximately 3 days ago, presented to ED with 

worsening dyspnea.





Acute hypoxic respiratory failure secondary to COVID-19


--comfortable on HIFLo, wean as tolerate. s/p convalescent plasma. cont 

Decadron, Vit C/D/Zinc 


--Not a candidate for Remdesivir d/t renal function. Appreciate ID input


--consider consult Pulmonology if she further de-compensates 


--cont IV Lasix given even evidence of Pul edema, rpt CXR improved. 


--Check Echo when able.  


--cont supportive cares. Follow AM labs





COVID-19 PNA


--mgt above. 





RAMONA on CKD stage III


--Monitor, avoid nephrotoxic agent. Follow BMP. Cr stable. 





Metabolic Acidosis


--add Sodium Bicarb, follow BMP 





Diabetes type 2


--BG elevated d/t steroid. Resume Lantus, cont ISS





Hypothyroidism


--cont Levothyroxine 





Klebsiella UTI, poa


--cont IV abx as above 





DVT ppx: Lovenox


GI ppx: PPI


Code Status: Full code


Anticipated Dispo: Home when medically stable





I have updated her daughter daily, Mindy over the phone with regard to her 

change of condition.
- Subjective


Subjective: 


pt remains intubated. d/w nursing staff. pt was seen and examined. 





- Objective


Vital Signs & Weight: 


                             Vital Signs (12 hours)











  Temp Pulse Pulse Pulse Resp BP BP


 


 11/07/20 14:37   106 H     145/58 H 


 


 11/07/20 14:00      26 H  


 


 11/07/20 12:00  98.7 F     30 H  


 


 11/07/20 11:47   106 H     145/58 H 


 


 11/07/20 10:00      30 H  


 


 11/07/20 09:33   114 H     146/70 H 


 


 11/07/20 09:32   114 H     146/70 H 


 


 11/07/20 08:32    115 H  113 H    141/58 H


 


 11/07/20 08:14   114 H     146/70 H 


 


 11/07/20 08:00  98.1 F     32 H  


 


 11/07/20 06:00      32 H  














  BP Pulse Ox Pulse Ox Pulse Ox


 


 11/07/20 14:37    


 


 11/07/20 14:00    


 


 11/07/20 12:00    


 


 11/07/20 11:47    


 


 11/07/20 10:00    


 


 11/07/20 09:33    


 


 11/07/20 09:32    


 


 11/07/20 08:32  140/57 L   99  99


 


 11/07/20 08:14    


 


 11/07/20 08:00   100  


 


 11/07/20 06:00    








                                     Weight











Admit Weight                   176 lb 8 oz


 


Weight                         184 lb 8.43 oz











                            Most Recent Monitor Data











Heart Rate from ECG            104


 


NIBP                           143/57


 


NIBP BP-Mean                   85


 


Respiration from ECG           22


 


SpO2                           98














I&O: 


                                        











 11/06/20 11/07/20 11/08/20





 06:59 06:59 06:59


 


Intake Total 1006 1610 50


 


Output Total 2745 1054 85


 


Balance -1739 556 -35











Result Diagrams: 


                                 11/07/20 04:00





                                 11/07/20 04:00


Additional Labs: 


                                   Accuchecks











  11/07/20 11/06/20





  04:00 21:07


 


POC Glucose  297 H  150 H











Radiology Reviewed by me: Yes


EKG Reviewed by me: Yes





Hospitalist ROS





- Medication


Medications: 


Active Medications











Generic Name Dose Route Start Last Admin





  Trade Name Freq  PRN Reason Stop Dose Admin


 


Acetaminophen  650 mg  10/12/20 14:11  10/17/20 22:18





  Acetaminophen 325 Mg Tab  PO   650 mg





  Q4H PRN   Administration





  Headache/Fever/Mild Pain (1-3)  


 


Amlodipine Besylate  5 mg  10/13/20 09:00  11/07/20 09:33





  Amlodipine 5 Mg Tab  PO   5 mg





  BID SAIDA   Administration


 


Ascorbic Acid  1,000 mg  10/15/20 21:00  11/07/20 09:33





  Ascorbic Acid 500 Mg Chewable Tablet  PO   1,000 mg





  BID SAIDA   Administration


 


Brimonidine Tartrate  1 drop  10/13/20 09:00  11/07/20 09:34





  Brimonidine Tartrate 0.2% Ophth Soln 5 Ml Bottle  R EYE   1 drop





  BID SAIDA   Administration


 


Calcitriol  0.25 mcg  10/13/20 09:00  11/07/20 09:33





  Calcitriol 0.25 Mcg Cap  PO   0.25 mcg





  DAILY SAIDA   Administration


 


Cholecalciferol  1,000 units  10/13/20 09:00  11/07/20 09:44





  Cholecalciferol 1,000 Units (25 Mcg) Tab  PO   1,000 units





  DAILY SAIDA   Administration


 


Dextrose/Water  25 gm  10/12/20 14:15  11/01/20 17:30





  Dextrose 50% Abboject 50 Ml Syringe  SLOW IVP   25 gm





  PRN PRN   Administration





  Hypoglycemia  


 


Diphenhydramine HCl  25 mg  10/14/20 04:24  10/20/20 20:11





  Diphenhydramine 25 Mg Cap  PO   25 mg





  HSPRN PRN   Administration





  Insomnia  


 


Dorzolamide/Timolol  1 drop  10/13/20 09:00  11/07/20 14:37





  Dorzolamide/Timolol 2%/0.5% Ophth Soln 10 Ml Bottle  EA EYE   1 each





  TID SAIDA   Administration


 


Fondaparinux  5 mg  11/04/20 06:00  11/07/20 05:21





  Fondaparinux Sodium 2.5 Mg/0.5 Ml Syringe  SC   5 mg





  DAILY@0600 SAIDA   Administration


 


Furosemide  80 mg  11/03/20 14:00  11/07/20 14:37





  Furosemide 100 Mg/10 Ml Vial  SLOW IVP   80 mg





  0600,1400 SAIDA   Administration


 


Guaifenesin  600 mg  11/03/20 13:00  11/07/20 14:37





  Diabetic Tussin 200 Mg/10 Ml Udcup  PER TUBE   600 mg





  Q4HR SAIDA   Administration


 


Hydralazine HCl  50 mg  10/13/20 09:00  11/07/20 14:37





  Hydralazine 25 Mg Tab  PO   50 mg





  TID SAIDA   Administration


 


Dextrose/Water  1,000 mls @ 0 mls/hr  10/12/20 14:15  11/02/20 02:43





  D5w  IV   1,000 mls





  .Q0M PRN   Administration





  Hypoglycemia  





  As Directed  


 


Nicardipine HCl 50 mg/ Sodium  40 mg in 250 mls @ 0 mls/hr  11/03/20 13:30  

11/04/20 10:29





  Chloride  IVPB   250 mls





  INF SAIDA   Administration





  Protocol  





  Titrate  


 


Insulin Human Lispro  0 units  10/12/20 21:21  11/06/20 05:00





  Humalog 300 Units/3 Ml Vial  SC   3 unit





  .BEDTIME SLIDING SC PRN   Administration





  Bedtime Correctional Scale  


 


Insulin Human Lispro  0 units  10/28/20 10:45  11/07/20 05:22





  Humalog 300 Units/3 Ml Vial  SC   6 unit





  .MODERATE SLIDING SC PRN   Administration





  MODERATE SLIDING SCALE  





  Protocol  


 


Isosorbide Dinitrate  20 mg  10/22/20 21:00  11/07/20 09:44





  Isosorbide Dinitrate 20 Mg Tab  PO   20 mg





  BID SAIDA   Administration


 


Labetalol HCl  10 mg  10/12/20 14:18  11/05/20 19:18





  Labetalol Hcl 100 Mg/20 Ml Vial  SLOW IVP   10 mg





  Q4H PRN   Administration





  SBP Greater Than 180  


 


Lactulose  30 gm  10/29/20 09:00  11/06/20 10:29





  Lactulose 20 Gm/30 Ml Udcup  PO   Not Given





  Q2D SAIDA  


 


Levothyroxine Sodium  25 mcg  10/17/20 06:00  11/07/20 05:21





  Levothyroxine Sodium 25 Mcg Tab  PO   25 mcg





  0600 SAIDA   Administration


 


Lorazepam  2 mg  11/02/20 14:53  11/07/20 09:31





  Lorazepam 2 Mg/Ml Vial  SLOW IVP  12/02/20 14:54  2 mg





  Q1H PRN   Administration





  Breakthrough agitation  


 


Methylprednisolone Sodium Succinate  40 mg  10/17/20 21:00  11/07/20 09:34





  Methylprednisolone Sod Succ 40 Mg Vial  IVP   40 mg





  Q12HR SAIDA   Administration


 


Pantoprazole Sodium  40 mg  10/27/20 09:00  11/07/20 10:26





  Pantoprazole 40 Mg Vial  IVP   40 mg





  DAILY SAIDA   Administration


 


Propylene Glycol  1 drop  10/13/20 09:00  11/07/20 09:37





  Polyethylene Glycol Opth Drop 15 Ml Bot  EA EYE   1 each





  BID SAIDA   Administration


 


Scopolamine  1.5 mg  10/25/20 11:00  11/06/20 13:32





  Scopolamine 1.5 Mg/72 Hour Patch  TD   1.5 mg





  Q3D SAIDA   Administration


 


Simvastatin  10 mg  10/19/20 21:00  11/06/20 20:35





  Simvastatin 10 Mg Tab  PO   10 mg





  2100 SAIDA   Administration


 


Sodium Chloride  10 ml  10/13/20 09:00  11/07/20 09:36





  Flush - Normal Saline 10 Ml Syringe  IVF   10 ml





  Q12HR SAIDA   Administration


 


Sodium Polystyrene Sulfonate  30 gm  10/29/20 09:00  11/06/20 10:29





  Sodium Polystyrene Sulfonate 15 Gm/60 Ml Bot  PO   Not Given





  Q2D SAIDA  


 


Sterile Water  1 ml  10/17/20 11:00  11/04/20 08:43





  Bacteriostatic Water 30 Ml Vial  FS   1 ml





  PRN PRN   Administration





  RECONSTITUTION  


 


Vecuronium Bromide  10 mg  10/23/20 11:13  11/04/20 13:54





  Vecuronium 10 Mg Vial  IV   10 mg





  Q1H PRN   Administration





   SEDATION  


 


Zinc Sulfate  220 mg  10/16/20 09:00  11/07/20 10:26





  Zinc Sulfate 220 Mg Cap  PO   220 mg





  DAILY SAIDA   Administration














- Exam


General - other findings: remains intubated


ENT: normocephalic atraumatic


Neck: supple


Heart: RRR


Respiratory: rhonchi


Gastrointestinal: soft


Extremities: no cyanosis


Skin: normal turgor


Neurological - other findings: sedated


Musculoskeletal: normal tone





Hosp A[facility]





- Plan





Patient is a pleasant 64 years old female with multiple comorbidities including 

diabetes type 2, hypertension, chronic systolic heart failure, hypertension who 

was diagnosed with Covid approximately 3 days ago, presented to ED with 

worsening dyspnea.





Acute hypoxic respiratory failure secondary to COVID-19


--further management as per pulmonology


--intubated since 10/23





COVID-19 PNA


--mgt above. s/p covalescent plasma. cont steroid





Acute toxic encephalopathy 


--remains unresponsive even after sedations weaned off


--will consult neurology on Monday





Bilateral pneumothoraces


--s/p devin chest tube placement, mgt as per surgery 





RAMONA on CKD stage III


--now on HD since 10/29





Metabolic Acidosis


--resolved 





Diabetes type 2


--BG elevated d/t steroid. Incr Lantus, cont ISS





Hypothyroidism


--cont Levothyroxine 





Klebsiella UTI, poa


--completed abx 





DVT ppx: Arixtra


GI ppx: PPI


Code Status: Full code


Anticipated Dispo: Home when medically stable





Attempted to update daughter, Mindy and Renetta, no response.
- Subjective


Subjective: 


remains intubated, surgery saw plan for trach and peg placement this Friday 





- Objective


Vital Signs & Weight: 


                             Vital Signs (12 hours)











  Temp Pulse Resp BP Pulse Ox


 


 11/09/20 16:00   85   183/62 H 


 


 11/09/20 14:49   72   173/53 H 


 


 11/09/20 14:00    26 H  


 


 11/09/20 13:30   75   172/66 H 


 


 11/09/20 12:00  98.7 F   27 H  


 


 11/09/20 10:24   69   147/51 H 


 


 11/09/20 10:00    26 H  


 


 11/09/20 09:09   78   156/57 H 


 


 11/09/20 09:07   80   156/57 H 


 


 11/09/20 08:04   92   170/64 H 


 


 11/09/20 08:00  98.3 F   31 H   95


 


 11/09/20 06:11   101 H   177/64 H 


 


 11/09/20 06:00    29 H  








                                     Weight











Admit Weight                   176 lb 8 oz


 


Weight                         175 lb 4.28 oz











                            Most Recent Monitor Data











Heart Rate from ECG            73


 


NIBP                           175/54


 


NIBP BP-Mean                   94


 


Respiration from ECG           23


 


SpO2                           99














I&O: 


                                        











 11/08/20 11/09/20 11/10/20





 06:59 06:59 06:59


 


Intake Total 1765 1805 200


 


Output Total 1860 646 240


 


Balance -95 1159 -40











Result Diagrams: 


                                 11/09/20 02:54





                                 11/09/20 02:54


Additional Labs: 


                                   Accuchecks











  11/09/20 11/09/20 11/08/20





  11:10 04:08 21:10


 


POC Glucose  132 H  217 H  238 H














  11/07/20 11/06/20 11/01/20





  12:00 16:56 17:16


 


POC Glucose  145 H  120 H  17 L*











Radiology Reviewed by me: Yes


EKG Reviewed by me: Yes





Hospitalist ROS





- Medication


Medications: 


Active Medications











Generic Name Dose Route Start Last Admin





  Trade Name Freq  PRN Reason Stop Dose Admin


 


Acetaminophen  650 mg  10/12/20 14:11  10/17/20 22:18





  Acetaminophen 325 Mg Tab  PO   650 mg





  Q4H PRN   Administration





  Headache/Fever/Mild Pain (1-3)  


 


Amlodipine Besylate  5 mg  10/13/20 09:00  11/09/20 09:09





  Amlodipine 5 Mg Tab  PO   5 mg





  BID SAIDA   Administration


 


Ascorbic Acid  1,000 mg  10/15/20 21:00  11/09/20 09:08





  Ascorbic Acid 500 Mg Chewable Tablet  PO   1,000 mg





  BID SAIDA   Administration


 


Brimonidine Tartrate  1 drop  10/13/20 09:00  11/09/20 09:22





  Brimonidine Tartrate 0.2% Ophth Soln 5 Ml Bottle  R EYE   1 drop





  BID SAIDA   Administration


 


Calcitriol  0.25 mcg  10/13/20 09:00  11/09/20 09:09





  Calcitriol 0.25 Mcg Cap  PO   0.25 mcg





  DAILY SAIDA   Administration


 


Cholecalciferol  1,000 units  10/13/20 09:00  11/09/20 09:20





  Cholecalciferol 1,000 Units (25 Mcg) Tab  PO   1,000 units





  DAILY SAIDA   Administration


 


Dextrose/Water  25 gm  10/12/20 14:15  11/01/20 17:30





  Dextrose 50% Abboject 50 Ml Syringe  SLOW IVP   25 gm





  PRN PRN   Administration





  Hypoglycemia  


 


Diphenhydramine HCl  25 mg  10/14/20 04:24  10/20/20 20:11





  Diphenhydramine 25 Mg Cap  PO   25 mg





  HSPRN PRN   Administration





  Insomnia  


 


Dorzolamide/Timolol  1 drop  10/13/20 09:00  11/09/20 14:50





  Dorzolamide/Timolol 2%/0.5% Ophth Soln 10 Ml Bottle  EA EYE   1 each





  TID SAIDA   Administration


 


Guaifenesin  600 mg  11/03/20 13:00  11/09/20 12:39





  Diabetic Tussin 200 Mg/10 Ml Udcup  PER TUBE   600 mg





  Q4HR SAIDA   Administration


 


Hydralazine HCl  50 mg  10/13/20 09:00  11/09/20 14:49





  Hydralazine 25 Mg Tab  PO   50 mg





  TID SAIDA   Administration


 


Dextrose/Water  1,000 mls @ 0 mls/hr  10/12/20 14:15  11/02/20 02:43





  D5w  IV   1,000 mls





  .Q0M PRN   Administration





  Hypoglycemia  





  As Directed  


 


Nicardipine HCl 50 mg/ Sodium  40 mg in 250 mls @ 0 mls/hr  11/03/20 13:30  

11/09/20 11:18





  Chloride  IVPB   250 mls





  INF SAIDA   Administration





  Protocol  





  Titrate  


 


Insulin Glargine 15 units/  0.15 mls @ 0 mls/hr  11/07/20 21:00  11/08/20 21:07





  Miscellaneous Medication  SC   0.15 mls





  HS SAIDA   Administration


 


Insulin Human Lispro  0 units  10/12/20 21:21  11/08/20 21:10





  Humalog 300 Units/3 Ml Vial  SC   2 unit





  .BEDTIME SLIDING SC PRN   Administration





  Bedtime Correctional Scale  


 


Insulin Human Lispro  0 units  10/28/20 10:45  11/09/20 05:09





  Humalog 300 Units/3 Ml Vial  SC   4 unit





  .MODERATE SLIDING SC PRN   Administration





  MODERATE SLIDING SCALE  





  Protocol  


 


Isosorbide Dinitrate  20 mg  10/22/20 21:00  11/09/20 09:20





  Isosorbide Dinitrate 20 Mg Tab  PO   20 mg





  BID SAIDA   Administration


 


Labetalol HCl  10 mg  10/12/20 14:18  11/09/20 06:11





  Labetalol Hcl 100 Mg/20 Ml Vial  SLOW IVP   10 mg





  Q4H PRN   Administration





  SBP Greater Than 180  


 


Levothyroxine Sodium  25 mcg  10/17/20 06:00  11/09/20 05:09





  Levothyroxine Sodium 25 Mcg Tab  PO   25 mcg





  0600 SAIDA   Administration


 


Lorazepam  2 mg  11/02/20 14:53  11/07/20 09:31





  Lorazepam 2 Mg/Ml Vial  SLOW IVP  12/02/20 14:54  2 mg





  Q1H PRN   Administration





  Breakthrough agitation  


 


Methylprednisolone Sodium Succinate  40 mg  10/17/20 21:00  11/09/20 09:06





  Methylprednisolone Sod Succ 40 Mg Vial  IVP   40 mg





  Q12HR SAIDA   Administration


 


Metoprolol Tartrate  50 mg  11/09/20 09:00  11/09/20 09:11





  Metoprolol Tartrate 50 Mg Tab  PO   50 mg





  BID SAIDA   Administration


 


Pantoprazole Sodium  40 mg  10/27/20 09:00  11/09/20 09:06





  Pantoprazole 40 Mg Vial  IVP   40 mg





  DAILY SAIDA   Administration


 


Propylene Glycol  1 drop  10/13/20 09:00  11/09/20 09:27





  Polyethylene Glycol Opth Drop 15 Ml Bot  EA EYE   1 each





  BID SAIDA   Administration


 


Scopolamine  1.5 mg  10/25/20 11:00  11/09/20 11:10





  Scopolamine 1.5 Mg/72 Hour Patch  TD   1.5 mg





  Q3D SAIDA   Administration


 


Simvastatin  10 mg  10/19/20 21:00  11/08/20 21:06





  Simvastatin 10 Mg Tab  PO   10 mg





  2100 SAIDA   Administration


 


Sodium Chloride  10 ml  10/13/20 09:00  11/09/20 09:12





  Flush - Normal Saline 10 Ml Syringe  IVF   Not Given





  Q12HR SAIDA  


 


Sodium Polystyrene Sulfonate  30 gm  10/29/20 09:00  11/08/20 17:52





  Sodium Polystyrene Sulfonate 15 Gm/60 Ml Bot  PO   30 gm





  Q2D SAIDA   Administration


 


Sterile Water  1 ml  10/17/20 11:00  11/08/20 21:05





  Bacteriostatic Water 30 Ml Vial  FS   1 ml





  PRN PRN   Administration





  RECONSTITUTION  


 


Vecuronium Bromide  10 mg  10/23/20 11:13  11/08/20 13:50





  Vecuronium 10 Mg Vial  IV   10 mg





  Q1H PRN   Administration





   SEDATION  


 


Zinc Sulfate  220 mg  10/16/20 09:00  11/09/20 09:07





  Zinc Sulfate 220 Mg Cap  PO   220 mg





  DAILY SAIDA   Administration














- Exam


General - other findings: intubated


Eye: PERRL


ENT: normocephalic atraumatic


Neck: supple


Heart: RRR


Respiratory: CTAB


Gastrointestinal: soft


Extremities: no cyanosis


Skin: normal turgor


Neurological - other findings: intubated





Hosp A/P





- Plan





Patient is a pleasant 64 years old female with multiple comorbidities including 

diabetes type 2, hypertension, chronic systolic heart failure, hypertension who 

was diagnosed with Covid approximately 3 days ago, presented to ED with 

worsening dyspnea.





Acute hypoxic respiratory failure secondary to COVID-19


--further management as per pulmonology


--intubated since 10/23. Trach and Peg placement plans for this Firday 





COVID-19 PNA


--mgt above. s/p covalescent plasma. cont steroid. Isolation was discontinued. 





Acute toxic encephalopathy 


--remains unresponsive even after sedations weaned off


--EEG no evidence of seizure. Neurology consult





Bilateral pneumothoraces


--s/p devin chest tube placement, mgt as per surgery 





RAMONA on CKD stage III


--now on HD since 10/29. Further mgt as per Dr. Mcnamara





Anemia of chronic kidney disease


--s/p transfusions. Follow CBC. Keep Hb above 7





Metabolic Acidosis


--resolved 





Diabetes type 2


--BG elevated d/t steroid. Incr Lantus, cont ISS





HTN


--cont Metoprolol 





Hypothyroidism


--cont Levothyroxine 





Klebsiella UTI, poa


--completed abx 





DVT ppx: Arixtra


GI ppx: PPI


Code Status: Full code


Anticipated Dispo: Home when medically stable
Statement Selected

## 2020-11-25 NOTE — [email or web address]
Provider





- Provider


Date of Admission: 


10/12/20 12:57





Admitting Provider: 


Zachary Gonzalez MD





Primary Care Physician: 


Kaylyn Gupta MD








Course





- Hospital Course


Hospital Course: 


Acute hypoxic respiratory failure secondary to COVID-19


--intubated since 10/23. Trach and Peg placement plans for today


--cont supportive cares. Family consider withdraw care if no improvement after 

trach for a week. 





COVID-19 PNA


--mgt above. s/p covalescent plasma. cont steroid. Isolation was discontinued. 





Acute toxic encephalopathy 


--remains unresponsive even after sedations weaned off


--EEG no evidence of seizure. likely related to COVID-19


--CT head negative 





Bilateral pneumothoraces


--s/p devin chest tube placement, mgt as per surgery 





RAMONA on CKD stage III


--D since 10/29. 


- Dr. Mcnamara follows





Anemia of chronic kidney disease


--s/p transfusions. 





Metabolic Acidosis


--resolved 





Diabetes type 2


--BG elevated d/t steroid. Incr Lantus, cont ISS





HTN


--cont Metoprolol/hydralazine/Norvasc. 





Hypothyroidism


--cont Levothyroxine 





Klebsiella UTI, poa


--completed abx 





15th


Bilateral pneumothorax due to Covid pneumonia


Resolving left-sided pneumothorax


Chest tube placed





Ongoing dialysis today





Persistent respiratory failure requiring intubation for over 3 weeks


Neurologically she remained encephalopathic.


Prognosis remains guarded.








DVT ppx: Arixtra


GI ppx: PPI


Code Status: DNR








16th


No clinical change or improvement in her other than hemoglobin dropped to 6.2.  

Palliative is on board.  If she has no further improvement possible withdrawal 

of care discussion on Wednesday.  Will be dialyzed today.


Likely also blood transfusion t





17th


Withdrawal of care today and  inpatient hospice likely.





pt demised this day


Resuscitation Status: 








11/08/20 10:21


Resuscitation Status Routine 


   Resuscitation Status: DNAR: NO Resuscitation


   Discussed with: daughter











- Labs


Lab Results: 


                                        





                                 11/17/20 04:15 





                                 11/17/20 04:15 





Microbiology - Entire Visit





11/05/20 21:40   Stool   C. difficile GDH Antigen & Toxins - Final


10/12/20 11:34   Venous blood - Left Arm   Blood Culture - Final


                            NO GROWTH IN 5 DAYS


10/12/20 11:42   Venous blood - Right Arm   Blood Culture - Final


                            NO GROWTH IN 5 DAYS


10/14/20 09:45   Urine voided   Urine Culture - Final


                            Klebsiella pneumoniae ssp pneu











- Physical Exam


Vitals: 


                                     Weight











Admit Weight                   176 lb 8 oz


 


Weight                         179 lb 0.246 oz











                            Most Recent Monitor Data











Heart Rate from ECG            50


 


NIBP                           132/43


 


NIBP BP-Mean                   72


 


Respiration from ECG           14


 


SpO2                           94














Physical Exam: 


The patient was seen and examined on the day of discharge.








Plan





- Discharge Medications


Home Medications: 


                                        











 Medication  Instructions  Recorded  Confirmed  Type


 


Brimonidine Tartrate [Alphagan 1 drop R EYE BID 05/21/17 10/12/20 History





0.2% Ophth Soln]    


 


Cholecalciferol (Vitamin D3) 1,000 unit PO DAILY 05/21/17 10/12/20 History





[Vitamin D3]    


 


Dorzolamide HCl/Timolol Maleat 1 drop EA EYE TID 05/21/17 10/12/20 History





[Dorzolamide HCl/Timolol Maleate    





Ophth]    


 


Esomeprazole Magnesium [NexIUM] 40 mg PO DAILY 05/21/17 10/12/20 History


 


Furosemide 40 mg PO DAILY 05/21/17 10/12/20 History


 


Levothyroxine Sodium 25 mcg PO DAILY 05/21/17 10/12/20 History


 


Lovastatin 20 mg PO QPM-WM 05/21/17 10/12/20 History


 


Metoprolol Tartrate 50 mg PO BID 05/21/17 10/12/20 History


 


glipiZIDE [glipiZIDE XL] 5 mg PO BID 05/21/17 10/12/20 History


 


Amlodipine [Norvasc] 5 mg PO BID 10/12/20 10/12/20 History


 


Calcitriol [Rocaltrol] 0.25 mcg PO DAILY 10/12/20 10/12/20 History


 


Carboxymethylcellulose Sodium 1 drop EA EYE DAILY 10/12/20 10/12/20 History





[Refresh Liquigel]    


 


Furosemide [Lasix] 20 mg PO 1400 10/12/20 10/12/20 History


 


Insulin Detemir [Levemir Flextouch] 45 unit SQ BID 10/12/20 10/12/20 History


 


Omega-3 Fatty Acids/Fish Oil 3 cap PO DAILY 10/12/20 10/12/20 History





[Omega 3 Fish Oil Softgel]    


 


Propylene Glycol/ 1 drop EA EYE BID 10/12/20 10/12/20 History





[Lubricant 0.3%-0.4% Eye Drops]    


 


hydrALAZINE HCl [Hydralazine HCl] 50 mg PO TID 10/12/20 10/12/20 History











Allergies: 








No Known Allergies Allergy (Verified 03/21/20 11:10)


   








- Discharge Instructions


Activity:: Activity as Tolerated


Nourishment:: Other





- Follow up Plan


Referrals: 


Kaylyn Gupta MD [Primary Care Provider] - 


Disposition: [facility]





Quality





- Care Measures


CORE MEASURES:: N/A